# Patient Record
Sex: MALE | Race: WHITE | NOT HISPANIC OR LATINO | URBAN - METROPOLITAN AREA
[De-identification: names, ages, dates, MRNs, and addresses within clinical notes are randomized per-mention and may not be internally consistent; named-entity substitution may affect disease eponyms.]

---

## 2020-12-28 ENCOUNTER — IMMUNIZATIONS (OUTPATIENT)
Dept: FAMILY MEDICINE CLINIC | Facility: HOSPITAL | Age: 45
End: 2020-12-28

## 2020-12-28 DIAGNOSIS — Z23 ENCOUNTER FOR IMMUNIZATION: ICD-10-CM

## 2020-12-28 PROCEDURE — 0011A SARS-COV-2 / COVID-19 MRNA VACCINE (MODERNA) 100 MCG: CPT

## 2020-12-28 PROCEDURE — 91301 SARS-COV-2 / COVID-19 MRNA VACCINE (MODERNA) 100 MCG: CPT

## 2021-01-26 ENCOUNTER — IMMUNIZATIONS (OUTPATIENT)
Dept: FAMILY MEDICINE CLINIC | Facility: HOSPITAL | Age: 46
End: 2021-01-26

## 2021-01-26 DIAGNOSIS — Z23 ENCOUNTER FOR IMMUNIZATION: Primary | ICD-10-CM

## 2021-01-26 PROCEDURE — 91301 SARS-COV-2 / COVID-19 MRNA VACCINE (MODERNA) 100 MCG: CPT

## 2021-01-26 PROCEDURE — 0012A SARS-COV-2 / COVID-19 MRNA VACCINE (MODERNA) 100 MCG: CPT

## 2022-10-07 ENCOUNTER — APPOINTMENT (OUTPATIENT)
Dept: LAB | Facility: CLINIC | Age: 47
End: 2022-10-07

## 2022-10-07 ENCOUNTER — OCCMED (OUTPATIENT)
Dept: URGENT CARE | Facility: CLINIC | Age: 47
End: 2022-10-07

## 2022-10-07 DIAGNOSIS — Z02.1 PRE-EMPLOYMENT EXAMINATION: Primary | ICD-10-CM

## 2022-10-07 DIAGNOSIS — Z02.1 PRE-EMPLOYMENT EXAMINATION: ICD-10-CM

## 2022-10-07 LAB — RUBV IGG SERPL IA-ACNC: >175 IU/ML

## 2022-10-07 PROCEDURE — 86480 TB TEST CELL IMMUN MEASURE: CPT

## 2022-10-07 PROCEDURE — 86735 MUMPS ANTIBODY: CPT

## 2022-10-07 PROCEDURE — 86765 RUBEOLA ANTIBODY: CPT

## 2022-10-07 PROCEDURE — 86762 RUBELLA ANTIBODY: CPT

## 2022-10-07 PROCEDURE — 86787 VARICELLA-ZOSTER ANTIBODY: CPT

## 2022-10-07 PROCEDURE — 36415 COLL VENOUS BLD VENIPUNCTURE: CPT

## 2022-10-08 LAB
MEV IGG SER QL IA: NORMAL
MUV IGG SER QL IA: NORMAL
VZV IGG SER QL IA: NORMAL

## 2022-10-09 LAB
GAMMA INTERFERON BACKGROUND BLD IA-ACNC: 0.06 IU/ML
M TB IFN-G BLD-IMP: NEGATIVE
M TB IFN-G CD4+ BCKGRND COR BLD-ACNC: -0.01 IU/ML
M TB IFN-G CD4+ BCKGRND COR BLD-ACNC: -0.03 IU/ML
MITOGEN IGNF BCKGRD COR BLD-ACNC: >10 IU/ML

## 2022-10-27 ENCOUNTER — HOSPITAL ENCOUNTER (EMERGENCY)
Facility: HOSPITAL | Age: 47
Discharge: HOME | End: 2022-10-27
Attending: EMERGENCY MEDICINE
Payer: OTHER MISCELLANEOUS

## 2022-10-27 VITALS
RESPIRATION RATE: 18 BRPM | HEART RATE: 101 BPM | SYSTOLIC BLOOD PRESSURE: 126 MMHG | DIASTOLIC BLOOD PRESSURE: 74 MMHG | OXYGEN SATURATION: 96 % | TEMPERATURE: 98.6 F
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
## 2022-10-27 LAB
ALT SERPL W P-5'-P-CCNC: 24 U/L (ref 12–78)
HBV SURFACE AB SER-ACNC: >1000 MIU/ML
HBV SURFACE AG SER QL: NORMAL
HCV AB SER QL: NORMAL

## 2022-10-27 PROCEDURE — 86803 HEPATITIS C AB TEST: CPT

## 2022-10-27 PROCEDURE — 87389 HIV-1 AG W/HIV-1&-2 AB AG IA: CPT

## 2022-10-27 PROCEDURE — 86706 HEP B SURFACE ANTIBODY: CPT

## 2022-10-27 PROCEDURE — 84460 ALANINE AMINO (ALT) (SGPT): CPT

## 2022-10-27 PROCEDURE — 36415 COLL VENOUS BLD VENIPUNCTURE: CPT

## 2022-10-27 PROCEDURE — 87340 HEPATITIS B SURFACE AG IA: CPT
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
## 2022-10-27 NOTE — DISCHARGE INSTRUCTIONS
We will have to call you with the results of the exposure panel from the source patient  You will need to see your PCP in 30 days to repeat your blood testing  If you develop new or worsening symptoms, please return to the Emergency Department for further evaluation

## 2022-10-28 LAB — HIV 1+2 AB+HIV1 P24 AG SERPL QL IA: NORMAL
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
## 2023-01-11 ENCOUNTER — OFFICE VISIT (OUTPATIENT)
Dept: FAMILY MEDICINE CLINIC | Facility: CLINIC | Age: 48
End: 2023-01-11

## 2023-01-11 VITALS
OXYGEN SATURATION: 96 % | WEIGHT: 192.4 LBS | DIASTOLIC BLOOD PRESSURE: 80 MMHG | BODY MASS INDEX: 26.94 KG/M2 | RESPIRATION RATE: 14 BRPM | SYSTOLIC BLOOD PRESSURE: 136 MMHG | HEIGHT: 71 IN | HEART RATE: 100 BPM | TEMPERATURE: 97.3 F

## 2023-01-11 DIAGNOSIS — E55.9 VITAMIN D DEFICIENCY: ICD-10-CM

## 2023-01-11 DIAGNOSIS — E66.3 OVERWEIGHT: ICD-10-CM

## 2023-01-11 DIAGNOSIS — L65.9 HAIR LOSS: ICD-10-CM

## 2023-01-11 DIAGNOSIS — M79.10 MYALGIA: ICD-10-CM

## 2023-01-11 DIAGNOSIS — M79.18 MYOFASCIAL PAIN SYNDROME OF LUMBAR SPINE: ICD-10-CM

## 2023-01-11 DIAGNOSIS — M54.50 CHRONIC MIDLINE LOW BACK PAIN WITHOUT SCIATICA: ICD-10-CM

## 2023-01-11 DIAGNOSIS — N52.8 OTHER MALE ERECTILE DYSFUNCTION: ICD-10-CM

## 2023-01-11 DIAGNOSIS — E78.5 HYPERLIPIDEMIA, UNSPECIFIED HYPERLIPIDEMIA TYPE: ICD-10-CM

## 2023-01-11 DIAGNOSIS — Z11.4 ENCOUNTER FOR SCREENING FOR HIV: ICD-10-CM

## 2023-01-11 DIAGNOSIS — Z78.9 FALSE POSITIVE HIV SEROLOGY: ICD-10-CM

## 2023-01-11 DIAGNOSIS — Z13.1 SCREENING FOR DIABETES MELLITUS: ICD-10-CM

## 2023-01-11 DIAGNOSIS — F32.0 CURRENT MILD EPISODE OF MAJOR DEPRESSIVE DISORDER, UNSPECIFIED WHETHER RECURRENT (HCC): ICD-10-CM

## 2023-01-11 DIAGNOSIS — M47.816 LUMBAR SPONDYLOSIS: ICD-10-CM

## 2023-01-11 DIAGNOSIS — Q76.49 BERTOLOTTI'S SYNDROME: ICD-10-CM

## 2023-01-11 DIAGNOSIS — I10 PRIMARY HYPERTENSION: ICD-10-CM

## 2023-01-11 DIAGNOSIS — F41.9 ANXIETY: ICD-10-CM

## 2023-01-11 DIAGNOSIS — Z13.6 SCREENING FOR CARDIOVASCULAR CONDITION: ICD-10-CM

## 2023-01-11 DIAGNOSIS — Z00.00 ANNUAL PHYSICAL EXAM: Primary | ICD-10-CM

## 2023-01-11 DIAGNOSIS — G89.29 CHRONIC MIDLINE LOW BACK PAIN WITHOUT SCIATICA: ICD-10-CM

## 2023-01-11 PROBLEM — M54.9 BACK PAIN: Status: ACTIVE | Noted: 2018-10-03

## 2023-01-11 PROBLEM — N52.9 ED (ERECTILE DYSFUNCTION): Status: ACTIVE | Noted: 2023-01-11

## 2023-01-11 PROBLEM — F32.A DEPRESSION: Status: ACTIVE | Noted: 2023-01-11

## 2023-01-11 RX ORDER — LOSARTAN POTASSIUM 100 MG/1
1 TABLET ORAL DAILY
COMMUNITY
Start: 2023-01-09 | End: 2023-01-11 | Stop reason: SDUPTHER

## 2023-01-11 RX ORDER — ESCITALOPRAM OXALATE 20 MG/1
1 TABLET ORAL DAILY
COMMUNITY
Start: 2022-11-21 | End: 2023-01-11

## 2023-01-11 RX ORDER — BUPROPION HYDROCHLORIDE 300 MG/1
300 TABLET ORAL EVERY MORNING
Qty: 30 TABLET | Refills: 1 | Status: SHIPPED | OUTPATIENT
Start: 2023-01-11 | End: 2023-04-11

## 2023-01-11 RX ORDER — DULOXETIN HYDROCHLORIDE 60 MG/1
60 CAPSULE, DELAYED RELEASE ORAL DAILY
Qty: 30 CAPSULE | Refills: 1 | Status: SHIPPED | OUTPATIENT
Start: 2023-01-11

## 2023-01-11 RX ORDER — BUPROPION HYDROCHLORIDE 300 MG/1
300 TABLET ORAL EVERY MORNING
COMMUNITY
Start: 2022-10-24 | End: 2023-01-11 | Stop reason: SDUPTHER

## 2023-01-11 RX ORDER — LOSARTAN POTASSIUM 100 MG/1
100 TABLET ORAL DAILY
Qty: 30 TABLET | Refills: 1 | Status: SHIPPED | OUTPATIENT
Start: 2023-01-11

## 2023-01-11 RX ORDER — CYCLOBENZAPRINE HCL 10 MG
10 TABLET ORAL DAILY PRN
Qty: 30 TABLET | Refills: 0 | Status: SHIPPED | OUTPATIENT
Start: 2023-01-11

## 2023-01-11 RX ORDER — CYCLOBENZAPRINE HCL 10 MG
10 TABLET ORAL DAILY PRN
COMMUNITY
Start: 2022-10-07 | End: 2023-01-11 | Stop reason: SDUPTHER

## 2023-01-11 RX ORDER — TADALAFIL 2.5 MG/1
2.5 TABLET ORAL DAILY
Qty: 30 TABLET | Refills: 1 | Status: SHIPPED | OUTPATIENT
Start: 2023-01-11

## 2023-01-11 RX ORDER — SILDENAFIL 50 MG/1
50 TABLET, FILM COATED ORAL DAILY PRN
COMMUNITY
Start: 2022-10-07 | End: 2023-01-11

## 2023-01-11 RX ORDER — DULOXETIN HYDROCHLORIDE 30 MG/1
30 CAPSULE, DELAYED RELEASE ORAL DAILY
Qty: 7 CAPSULE | Refills: 0 | Status: SHIPPED | OUTPATIENT
Start: 2023-01-11

## 2023-01-11 NOTE — PATIENT INSTRUCTIONS
Wellness Visit for Adults   AMBULATORY CARE:   A wellness visit  is when you see your healthcare provider to get screened for health problems  Your healthcare provider will also give you advice on how to stay healthy  Write down your questions so you remember to ask them  Ask your healthcare provider how often you should have a wellness visit  What happens at a wellness visit:  Your healthcare provider will ask about your health, and your family history of health problems  This includes high blood pressure, heart disease, and cancer  He or she will ask if you have symptoms that concern you, if you smoke, and about your mood  You may also be asked about your intake of medicines, supplements, food, and alcohol  Any of the following may be done:  · Your weight  will be checked  Your height may also be checked so your body mass index (BMI) can be calculated  Your BMI shows if you are at a healthy weight  · Your blood pressure  and heart rate will be checked  Your temperature may also be checked  · Blood and urine tests  may be done  Blood tests may be done to check your cholesterol levels  Abnormal cholesterol levels increase your risk for heart disease and stroke  You may also need a blood or urine test to check for diabetes if you are at increased risk  Urine tests may be done to look for signs of an infection or kidney disease  · A physical exam  includes checking your heartbeat and lungs with a stethoscope  Your healthcare provider may also check your skin to look for sun damage  · Screening tests  may be recommended  A screening test is done to check for diseases that may not cause symptoms  The screening tests you may need depend on your age, gender, family history, and lifestyle habits  For example, colorectal screening may be recommended if you are 48years old or older  Screening tests you need if you are a woman:   · A Pap smear  is used to screen for cervical cancer   Pap smears are usually done every 3 to 5 years depending on your age  You may need them more often if you have had abnormal Pap smear test results in the past  Ask your healthcare provider how often you should have a Pap smear  · A mammogram  is an x-ray of your breasts to screen for breast cancer  Experts recommend mammograms every 2 years starting at age 48 years  You may need a mammogram at age 52 years or younger if you have an increased risk for breast cancer  Talk to your healthcare provider about when you should start having mammograms and how often you need them  Vaccines you may need:   · Get an influenza vaccine  every year  The influenza vaccine protects you from the flu  Several types of viruses cause the flu  The viruses change over time, so new vaccines are made each year  · Get a tetanus-diphtheria (Td) booster vaccine  every 10 years  This vaccine protects you against tetanus and diphtheria  Tetanus is a severe infection that may cause painful muscle spasms and lockjaw  Diphtheria is a severe bacterial infection that causes a thick covering in the back of your mouth and throat  · Get a human papillomavirus (HPV) vaccine  if you are female and aged 23 to 32 or male 23 to 24 and never received it  This vaccine protects you from HPV infection  HPV is the most common infection spread by sexual contact  HPV may also cause vaginal, penile, and anal cancers  · Get a pneumococcal vaccine  if you are aged 72 years or older  The pneumococcal vaccine is an injection given to protect you from pneumococcal disease  Pneumococcal disease is an infection caused by pneumococcal bacteria  The infection may cause pneumonia, meningitis, or an ear infection  · Get a shingles vaccine  if you are 60 or older, even if you have had shingles before  The shingles vaccine is an injection to protect you from the varicella-zoster virus  This is the same virus that causes chickenpox   Shingles is a painful rash that develops in people who had chickenpox or have been exposed to the virus  How to eat healthy:  My Plate is a model for planning healthy meals  It shows the types and amounts of foods that should go on your plate  Fruits and vegetables make up about half of your plate, and grains and protein make up the other half  A serving of dairy is included on the side of your plate  The amount of calories and serving sizes you need depends on your age, gender, weight, and height  Examples of healthy foods are listed below:  · Eat a variety of vegetables  such as dark green, red, and orange vegetables  You can also include canned vegetables low in sodium (salt) and frozen vegetables without added butter or sauces  · Eat a variety of fresh fruits , canned fruit in 100% juice, frozen fruit, and dried fruit  · Include whole grains  At least half of the grains you eat should be whole grains  Examples include whole-wheat bread, wheat pasta, brown rice, and whole-grain cereals such as oatmeal     · Eat a variety of protein foods such as seafood (fish and shellfish), lean meat, and poultry without skin (turkey and chicken)  Examples of lean meats include pork leg, shoulder, or tenderloin, and beef round, sirloin, tenderloin, and extra lean ground beef  Other protein foods include eggs and egg substitutes, beans, peas, soy products, nuts, and seeds  · Choose low-fat dairy products such as skim or 1% milk or low-fat yogurt, cheese, and cottage cheese  · Limit unhealthy fats  such as butter, hard margarine, and shortening  Exercise:  Exercise at least 30 minutes per day on most days of the week  Some examples of exercise include walking, biking, dancing, and swimming  You can also fit in more physical activity by taking the stairs instead of the elevator or parking farther away from stores  Include muscle strengthening activities 2 days each week  Regular exercise provides many health benefits   It helps you manage your weight, and decreases your risk for type 2 diabetes, heart disease, stroke, and high blood pressure  Exercise can also help improve your mood  Ask your healthcare provider about the best exercise plan for you  General health and safety guidelines:   · Do not smoke  Nicotine and other chemicals in cigarettes and cigars can cause lung damage  Ask your healthcare provider for information if you currently smoke and need help to quit  E-cigarettes or smokeless tobacco still contain nicotine  Talk to your healthcare provider before you use these products  · Limit alcohol  A drink of alcohol is 12 ounces of beer, 5 ounces of wine, or 1½ ounces of liquor  · Lose weight, if needed  Being overweight increases your risk of certain health conditions  These include heart disease, high blood pressure, type 2 diabetes, and certain types of cancer  · Protect your skin  Do not sunbathe or use tanning beds  Use sunscreen with a SPF 15 or higher  Apply sunscreen at least 15 minutes before you go outside  Reapply sunscreen every 2 hours  Wear protective clothing, hats, and sunglasses when you are outside  · Drive safely  Always wear your seatbelt  Make sure everyone in your car wears a seatbelt  A seatbelt can save your life if you are in an accident  Do not use your cell phone when you are driving  This could distract you and cause an accident  Pull over if you need to make a call or send a text message  · Practice safe sex  Use latex condoms if are sexually active and have more than one partner  Your healthcare provider may recommend screening tests for sexually transmitted infections (STIs)  · Wear helmets, lifejackets, and protective gear  Always wear a helmet when you ride a bike or motorcycle, go skiing, or play sports that could cause a head injury  Wear protective equipment when you play sports  Wear a lifejacket when you are on a boat or doing water sports      © Copyright [employer] 2022 Information is for End User's use only and may not be sold, redistributed or otherwise used for commercial purposes  All illustrations and images included in CareNotes® are the copyrighted property of [employer]  or Janee Olivier  The above information is an  only  It is not intended as medical advice for individual conditions or treatments  Talk to your doctor, nurse or pharmacist before following any medical regimen to see if it is safe and effective for you  Weight Management   AMBULATORY CARE:   Why it is important to manage your weight:  Being overweight increases your risk of health conditions such as heart disease, high blood pressure, type 2 diabetes, and certain types of cancer  It can also increase your risk for osteoarthritis, sleep apnea, and other respiratory problems  Aim for a slow, steady weight loss  Even a small amount of weight loss can lower your risk of health problems  Risks of being overweight:  Extra weight can cause many health problems, including the following:  · Diabetes (high blood sugar level)    · High blood pressure or high cholesterol    · Heart disease    · Stroke    · Gallbladder or liver disease    · Cancer of the colon, breast, prostate, liver, or kidney    · Sleep apnea    · Arthritis or gout    Screening  is done to check for health conditions before you have signs or symptoms  If you are 28to 79years old, your blood sugar level may be checked every 3 years for signs of prediabetes or diabetes  Your healthcare provider will check your blood pressure at each visit  High blood pressure can lead to a stroke or other problems  Your provider may check for signs of heart disease, cancer, or other health problems  How to lose weight safely:  A safe and healthy way to lose weight is to eat fewer calories and get regular exercise  · You can lose up about 1 pound a week by decreasing the number of calories you eat by 500 calories each day   You can decrease calories by eating smaller portion sizes or by cutting out high-calorie foods  Read labels to find out how many calories are in the foods you eat  · You can also burn calories with exercise such as walking, swimming, or biking  You will be more likely to keep weight off if you make these changes part of your lifestyle  Exercise at least 30 minutes per day on most days of the week  You can also fit in more physical activity by taking the stairs instead of the elevator or parking farther away from stores  Ask your healthcare provider about the best exercise plan for you  Healthy meal plan for weight management:  A healthy meal plan includes a variety of foods, contains fewer calories, and helps you stay healthy  A healthy meal plan includes the following:     · Eat whole-grain foods more often  A healthy meal plan should contain fiber  Fiber is the part of grains, fruits, and vegetables that is not broken down by your body  Whole-grain foods are healthy and provide extra fiber in your diet  Some examples of whole-grain foods are whole-wheat breads and pastas, oatmeal, brown rice, and bulgur  · Eat a variety of vegetables every day  Include dark, leafy greens such as spinach, kale, tsering greens, and mustard greens  Eat yellow and orange vegetables such as carrots, sweet potatoes, and winter squash  · Eat a variety of fruits every day  Choose fresh or canned fruit (canned in its own juice or light syrup) instead of juice  Fruit juice has very little or no fiber  · Eat low-fat dairy foods  Drink fat-free (skim) milk or 1% milk  Eat fat-free yogurt and low-fat cottage cheese  Try low-fat cheeses such as mozzarella and other reduced-fat cheeses  · Choose meat and other protein foods that are low in fat  Choose beans or other legumes such as split peas or lentils  Choose fish, skinless poultry (chicken or turkey), or lean cuts of red meat (beef or pork)   Before you cook meat or poultry, cut off any visible fat  · Use less fat and oil  Try baking foods instead of frying them  Add less fat, such as margarine, sour cream, regular salad dressing and mayonnaise to foods  Eat fewer high-fat foods  Some examples of high-fat foods include french fries, doughnuts, ice cream, and cakes  · Eat fewer sweets  Limit foods and drinks that are high in sugar  This includes candy, cookies, regular soda, and sweetened drinks  Ways to decrease calories:   · Eat smaller portions  ? Use a small plate with smaller servings  ? Do not eat second helpings  ? When you eat at a restaurant, ask for a box and place half of your meal in the box before you eat  ? Share an entrée with someone else  · Replace high-calorie snacks with healthy, low-calorie snacks  ? Choose fresh fruit, vegetables, fat-free rice cakes, or air-popped popcorn instead of potato chips, nuts, or chocolate  ? Choose water or calorie-free drinks instead of soda or sweetened drinks  · Do not shop for groceries when you are hungry  You may be more likely to make unhealthy food choices  Take a grocery list of healthy foods and shop after you have eaten  · Eat regular meals  Do not skip meals  Skipping meals can lead to overeating later in the day  This can make it harder for you to lose weight  Eat a healthy snack in place of a meal if you do not have time to eat a regular meal  Talk with a dietitian to help you create a meal plan and schedule that is right for you  Other things to consider as you try to lose weight:   · Be aware of situations that may give you the urge to overeat, such as eating while watching television  Find ways to avoid these situations  For example, read a book, go for a walk, or do crafts  · Meet with a weight loss support group or friends who are also trying to lose weight  This may help you stay motivated to continue working on your weight loss goals      © Copyright [employer] 2022 Information is for End User's use only and may not be sold, redistributed or otherwise used for commercial purposes  All illustrations and images included in CareNotes® are the copyrighted property of [employer] Inc  or Janee Olivier  The above information is an  only  It is not intended as medical advice for individual conditions or treatments  Talk to your doctor, nurse or pharmacist before following any medical regimen to see if it is safe and effective for you  Cholesterol and Your Health   AMBULATORY CARE:   Cholesterol  is a waxy, fat-like substance  Your body uses cholesterol to make hormones and new cells, and to protect nerves  Cholesterol is made by your body  It also comes from certain foods you eat, such as meat and dairy products  Your healthcare provider can help you set goals for your cholesterol levels  He or she can help you create a plan to meet your goals  Cholesterol level goals: Your cholesterol level goals depend on your risk for heart disease, your age, and your other health conditions  The following are general guidelines:  · Total cholesterol  includes low-density lipoprotein (LDL), high-density lipoprotein (HDL), and triglyceride levels  The total cholesterol level should be lower than 200 mg/dL and is best at about 150 mg/dL  · LDL cholesterol  is called bad cholesterol  because it forms plaque in your arteries  As plaque builds up, your arteries become narrow, and less blood flows through  When plaque decreases blood flow to your heart, you may have chest pain  If plaque completely blocks an artery that brings blood to your heart, you may have a heart attack  Plaque can break off and form blood clots  Blood clots may block arteries in your brain and cause a stroke  The level should be less than 130 mg/dL and is best at about 100 mg/dL  · HDL cholesterol  is called good cholesterol  because it helps remove LDL cholesterol from your arteries   It does this by attaching to LDL cholesterol and carrying it to your liver  Your liver breaks down LDL cholesterol so your body can get rid of it  High levels of HDL cholesterol can help prevent a heart attack and stroke  Low levels of HDL cholesterol can increase your risk for heart disease, heart attack, and stroke  The level should be 60 mg/dL or higher  · Triglycerides  are a type of fat that store energy from foods you eat  High levels of triglycerides also cause plaque buildup  This can increase your risk for a heart attack or stroke  If your triglyceride level is high, your LDL cholesterol level may also be high  The level should be less than 150 mg/dL  Any of the following can increase your risk for high cholesterol:   · Smoking cigarettes    · Being overweight or obese, or not getting enough exercise    · Drinking large amounts of alcohol    · A medical condition such as hypertension (high blood pressure) or diabetes    · Certain genes passed from your parents to you    · Age older than 65 years    What you need to know about having your cholesterol levels checked: Adults 21to 39years of age should have their cholesterol levels checked every 4 to 6 years  Adults 45 years or older should have their cholesterol checked every 1 to 2 years  You may need your cholesterol checked more often, or at a younger age, if you have risk factors for heart disease  You may also need to have your cholesterol checked more often if you have other health conditions, such as diabetes  Blood tests are used to check cholesterol levels  Blood tests measure your levels of triglycerides, LDL cholesterol, and HDL cholesterol  How healthy fats affect your cholesterol levels:  Healthy fats, also called unsaturated fats, help lower LDL cholesterol and triglyceride levels  Healthy fats include the following:  · Monounsaturated fats  are found in foods such as olive oil, canola oil, avocado, nuts, and olives      · Polyunsaturated fats,  such as omega 3 fats, are found in fish, such as salmon, trout, and tuna  They can also be found in plant foods such as flaxseed, walnuts, and soybeans  How unhealthy fats affect your cholesterol levels:  Unhealthy fats increase LDL cholesterol and triglyceride levels  They are found in foods high in cholesterol, saturated fat, and trans fat:  · Cholesterol  is found in eggs, dairy, and meat  · Saturated fat  is found in butter, cheese, ice cream, whole milk, and coconut oil  Saturated fat is also found in meat, such as sausage, hot dogs, and bologna  · Trans fat  is found in liquid oils and is used in fried and baked foods  Foods that contain trans fats include chips, crackers, muffins, sweet rolls, microwave popcorn, and cookies  Treatment  for high cholesterol will also decrease your risk of heart disease, heart attack, and stroke  Treatment may include any of the following:  · Lifestyle changes  may include food, exercise, weight loss, and quitting smoking  You may also need to decrease the amount of alcohol you drink  Your healthcare provider will want you to start with lifestyle changes  Other treatment may be added if lifestyle changes are not enough  Your healthcare provider may recommend you work with a team to manage hyperlipidemia  The team may include medical experts such as a dietitian, an exercise or physical therapist, and a behavior therapist  Your family members may be included in helping you create lifestyle changes  · Medicines  may be given to lower your LDL cholesterol, triglyceride levels, or total cholesterol level  You may need medicines to lower your cholesterol if any of the following is true:    ? You have a history of stroke, TIA, unstable angina, or a heart attack  ? Your LDL cholesterol level is 190 mg/dL or higher  ? You are age 36 to 76 years, have diabetes or heart disease risk factors, and your LDL cholesterol is 70 mg/dL or higher      · Supplements  include fish oil, red yeast rice, and garlic  Fish oil may help lower your triglyceride and LDL cholesterol levels  It may also increase your HDL cholesterol level  Red yeast rice may help decrease your total cholesterol level and LDL cholesterol level  Garlic may help lower your total cholesterol level  Do not take any supplements without talking to your healthcare provider  Food changes you can make to lower your cholesterol levels:  A dietitian can help you create a healthy eating plan  He or she can show you how to read food labels and choose foods low in saturated fat, trans fats, and cholesterol  · Decrease the total amount of fat you eat  Choose lean meats, fat-free or 1% fat milk, and low-fat dairy products, such as yogurt and cheese  Try to limit or avoid red meats  Limit or do not eat fried foods or baked goods, such as cookies  · Replace unhealthy fats with healthy fats  Cook foods in olive oil or canola oil  Choose soft margarines that are low in saturated fat and trans fat  Seeds, nuts, and avocados are other examples of healthy fats  · Eat foods with omega-3 fats  Examples include salmon, tuna, mackerel, walnuts, and flaxseed  Eat fish 2 times per week  Pregnant women should not eat fish that have high levels of mercury, such as shark, swordfish, and daryn mackerel  · Increase the amount of high-fiber foods you eat  High-fiber foods can help lower your LDL cholesterol  Aim to get between 20 and 30 grams of fiber each day  Fruits and vegetables are high in fiber  Eat at least 5 servings each day  Other high-fiber foods are whole-grain or whole-wheat breads, pastas, or cereals, and brown rice  Eat 3 ounces of whole-grain foods each day  Increase fiber slowly  You may have abdominal discomfort, bloating, and gas if you add fiber to your diet too quickly  · Eat healthy protein foods  Examples include low-fat dairy products, skinless chicken and turkey, fish, and nuts      · Limit foods and drinks that are high in sugar  Your dietitian or healthcare provider can help you create daily limits for high-sugar foods and drinks  The limit may be lower if you have diabetes or another health condition  Limits can also help you lose weight if needed  Lifestyle changes you can make to lower your cholesterol levels:   · Maintain a healthy weight  Ask your healthcare provider what a healthy weight is for you  Ask him or her to help you create a weight loss plan if needed  Weight loss can decrease your total cholesterol and triglyceride levels  Weight loss may also help keep your blood pressure at a healthy level  · Be physically active throughout the day  Physical activity, such as exercise, can help lower your total cholesterol level and maintain a healthy weight  Physical activity can also help increase your HDL cholesterol level  Work with your healthcare provider to create an program that is right for you  Get at least 30 to 40 minutes of moderate physical activity most days of the week  Examples of exercise include brisk walking, swimming, or biking  Also include strength training at least 2 times each week  Your healthcare providers can help you create a physical activity plan  · Do not smoke  Nicotine and other chemicals in cigarettes and cigars can raise your cholesterol levels  Ask your healthcare provider for information if you currently smoke and need help to quit  E-cigarettes or smokeless tobacco still contain nicotine  Talk to your healthcare provider before you use these products  · Limit or do not drink alcohol  Alcohol can increase your triglyceride levels  Ask your healthcare provider before you drink alcohol  Ask how much is okay for you to drink in 24 hours or 1 week  Follow up with your doctor as directed:  Write down your questions so you remember to ask them during your visits    © Copyright [employer] 2022 Information is for End User's use only and may not be sold, redistributed or otherwise used for commercial purposes  All illustrations and images included in CareNotes® are the copyrighted property of [employer]  or Janee Olivier  The above information is an  only  It is not intended as medical advice for individual conditions or treatments  Talk to your doctor, nurse or pharmacist before following any medical regimen to see if it is safe and effective for you

## 2023-01-11 NOTE — PROGRESS NOTES
Assessment/Plan:  Problem List Items Addressed This Visit        Cardiovascular and Mediastinum    Hypertension     Borderline BP today  Check blood pressure outside of office  Recommend lifestyle modifications  Relevant Medications    losartan (COZAAR) 100 MG tablet    Other Relevant Orders    Ambulatory Referral to Nutrition Services       Nervous and Auditory    Bertolotti's syndrome     S/p OAA Spine evaluation  May need to return in future PRN? Hopefully Cymbalta 60mg daily to improve pain and will no longer need Flexeril 10mg QD PRN  Relevant Medications    cyclobenzaprine (FLEXERIL) 10 mg tablet       Musculoskeletal and Integument    Lumbar spondylosis     S/p OAA Spine evaluation  May need to return in future PRN? Hopefully Cymbalta 60mg daily to improve pain and will no longer need Flexeril 10mg QD PRN  Relevant Medications    cyclobenzaprine (FLEXERIL) 10 mg tablet    Myofascial pain syndrome of lumbar spine     S/p OAA Spine evaluation  May need to return in future PRN? Hopefully Cymbalta 60mg daily to improve pain and will no longer need Flexeril 10mg QD PRN  Relevant Medications    DULoxetine (Cymbalta) 30 mg delayed release capsule    DULoxetine (CYMBALTA) 60 mg delayed release capsule    cyclobenzaprine (FLEXERIL) 10 mg tablet       Other    Anxiety     Uncontrolled  D/C Lexapro 20mg QD due to inefficacy  Start Cymbalta 60mg QD, which may benefit mood and back pain  Continue counseling  Relevant Medications    DULoxetine (Cymbalta) 30 mg delayed release capsule    DULoxetine (CYMBALTA) 60 mg delayed release capsule    Other Relevant Orders    TSH, 3rd generation with Free T4 reflex    Depression     Uncontrolled  D/C Lexapro 20mg QD due to inefficacy  Start Cymbalta 60mg QD, which may benefit mood and back pain  Continue counseling             Relevant Medications    DULoxetine (Cymbalta) 30 mg delayed release capsule    DULoxetine (CYMBALTA) 60 mg delayed release capsule    buPROPion (WELLBUTRIN XL) 300 mg 24 hr tablet    Other Relevant Orders    TSH, 3rd generation with Free T4 reflex    Hyperlipidemia     Pending labs  Previously stable s statin  Recommend lifestyle modifications  The ASCVD Risk score (Dee KING, et al , 2019) failed to calculate for the following reasons:    Cannot find a previous HDL lab    Cannot find a previous total cholesterol lab           Relevant Orders    Ambulatory Referral to Nutrition Services    CBC and differential    Comprehensive metabolic panel    Lipid panel    LDL cholesterol, direct    TSH, 3rd generation with Free T4 reflex    Overweight     Recommend lifestyle modifications  Relevant Orders    Ambulatory Referral to Nutrition Services    ED (erectile dysfunction)     D/C Viagra 50mg QD PRN  Start Cialis 2 5mg QD as patient prefers daily Rx  Pending labs  Relevant Medications    tadalafil (CIALIS) 2 5 MG tablet    Other Relevant Orders    Testosterone, free, total    Vitamin D deficiency     Pending labs  Relevant Orders    Ambulatory Referral to Nutrition Services    Back pain     S/p OAA Spine evaluation  May need to return in future PRN? Hopefully Cymbalta 60mg daily to improve pain and will no longer need Flexeril 10mg QD PRN            Other Visit Diagnoses     Annual physical exam    -  Primary    Screening for diabetes mellitus        Relevant Orders    Hemoglobin A1C    Screening for cardiovascular condition        Relevant Orders    CBC and differential    Comprehensive metabolic panel    Lipid panel    LDL cholesterol, direct    BMI 27 0-27 9,adult        Relevant Orders    TSH, 3rd generation with Free T4 reflex    Hair loss        Myalgia        Relevant Orders    Vitamin D 25 hydroxy    False positive HIV serology        Encounter for screening for HIV        Relevant Orders    : HIV 1/2 AB/AG w Reflex [facility] for 2 yr old and above    : HIV 1/2 AB/AG w Reflex [facility] for 2 yr old and above    : HIV 1/2 AB/AG w Reflex [facility] for 2 yr old and above           Return in about 6 weeks (around 2/22/2023) for F/U HTN, HL, Anx/Dep, ED, LBP, Labs  Future Appointments   Date Time Provider Patricio Sanders   2/28/2023  9:40 AM Marcial Horn, DO [facility]        Subjective:     Constantion Zarate is a 49 y o  male who presents today as a new patient for his medical conditions  New Patient    Previous PCP:  Dr Siva Osullivan at [facility]  Reason for Transfer:  Insurance  Last seen by previous PCP:  10/7/22  Last Labs:  10/27/22  Last Physical:  4/7/22  Medical Records Requested:  No      HPI:  Chief Complaint   Patient presents with   • New Patient Visit     Here to establish care  No problems or concerns at this time  • HM     Declines covid boosters at this time  Last colonoscopy was last year at [facility]  -- Above per clinical staff and reviewed  --      HPI      Today:      Controlled Substance Review    [place] PDMP or [place]  reviewed: No red flags were identified; safe to proceed with prescription  Hair Loss - Wants to know if Nutrafol is safe  He had not tried Rogaine yet  Overweight - Would like to see Nutritionist   Not watching diet  Drinks soda, eats fast food  +Exercise - [place] [employer] for 1 hours 2-3 times per week  HTN - On Losartan 100mg QD  No other HTN meds previously  No BP check outside of office  Hyperlipidemia - No statin previously  H/O  Grave's Disease / Hyperthyroidism - s/p Tapazole x 2 years, then radioactive iodine, which resulted in euthyroid status  Anxiety / Depression - Mood is "not great' - since summer 2022 - increased stress at work and in marriage  Previous Management per Psych at [address] - last seen 2022  - F/U PRN as was D/C to PCP Management  On Lexapro 20mg QD since 2021 and Wellbutrin XL 300mg QD in AM since 9/22    Previously on Zoloft, Prozac - did fine with these meds  No other mood meds or higher dose previously  Sees counseling q2 weeks  Next appt   PHQ-2/9 Depression Screening    Little interest or pleasure in doing things: 2 - more than half the days  Feeling down, depressed, or hopeless: 2 - more than half the days  Trouble falling or staying asleep, or sleeping too much: 2 - more than half the days  Feeling tired or having little energy: 2 - more than half the days  Poor appetite or overeatin - not at all  Feeling bad about yourself - or that you are a failure or have let yourself or your family down: 1 - several days  Trouble concentrating on things, such as reading the newspaper or watching television: 0 - not at all  Moving or speaking so slowly that other people could have noticed  Or the opposite - being so fidgety or restless that you have been moving around a lot more than usual: 0 - not at all  Thoughts that you would be better off dead, or of hurting yourself in some way: 0 - not at all  PHQ-2 Score: 4  PHQ-2 Interpretation: POSITIVE depression screen  PHQ-9 Score: 9   PHQ-9 Interpretation: Mild depression          CORA-7 Flowsheet Screening    Flowsheet Row Most Recent Value   Over the last 2 weeks, how often have you been bothered by any of the following problems? Feeling nervous, anxious, or on edge 1   Not being able to stop or control worrying 2   Worrying too much about different things 2   Trouble relaxing 2   Being so restless that it is hard to sit still 0   Becoming easily annoyed or irritable 1   Feeling afraid as if something awful might happen 0   CORA-7 Total Score 8        MDQ:  0, Asynchronous, No Problem    ED - Patient feels due to Lexapro and Wellbutrin  On Viagra 50mg QD PRN - helpful  Patient is interested in daily Cialis  He is unsure if Rx is covered by insurance  No other ED meds or higher doses previously      Chronic Lower Back Pain / Lumbar Spondylosis / Myofascial Pain Syndrome of Lumbar Spine / Bertolotti's Syndrome  - Has daily spasms on medical sacrum, radiates into B/L buttocks  Injured his back in 2015 during  training in [place]  He is using Flexeril 10mg 1/2 -1 daily PRN - taking 3-4 times per week  Previously seen Db 54 2021  S/p ALEC s benefit  Last MRI in [place] in [id number]  Surgery was not advised per advised  Last PT in [place] 2018 s benefit  Vitamin D Deficiency - No Drisdol  Not taking MVI, but taking OTC Vitamin D 5,000IU daily  H/O False Positive HIV test - s/p [facility] e-consult per Dr Michelle Mares 10/7/22, who advised ID F/U vs  PCP repeat the testing at three months, six months and one year, and include HIV PCR testing with the routine screening if it does not already reflex that way  Normal colonoscopy 11/12/20 by GI Dr Bonifacio Ramos @ [facility]  Repeat in 10 years  Reviewed:  Labs 10/27/22       Sees Dentist q6 months  Sees Optho q2 years  The following portions of the patient's history were reviewed and updated as appropriate: allergies, current medications, past family history, past medical history, past social history, past surgical history and problem list       Review of Systems   Constitutional: Negative for appetite change, chills, diaphoresis, fatigue and fever  Respiratory: Negative for chest tightness and shortness of breath  Cardiovascular: Negative for chest pain  Gastrointestinal: Negative for abdominal pain, blood in stool, diarrhea, nausea and vomiting  Genitourinary: Negative for dysuria  Musculoskeletal: Positive for back pain          Current Outpatient Medications   Medication Sig Dispense Refill   • buPROPion (WELLBUTRIN XL) 300 mg 24 hr tablet Take 1 tablet (300 mg total) by mouth every morning 30 tablet 1   • Cholecalciferol (Vitamin D3) 125 MCG (5000 UT) TABS Take 5,000 Units by mouth daily     • [employer] FIBER GUMMIES PO Take 2 tablets by mouth in the morning     • cyclobenzaprine (FLEXERIL) 10 mg tablet Take 1 tablet (10 mg total) by mouth daily as needed for muscle spasms 30 tablet 0   • DULoxetine (Cymbalta) 30 mg delayed release capsule Take 1 capsule (30 mg total) by mouth daily 7 capsule 0   • DULoxetine (CYMBALTA) 60 mg delayed release capsule Take 1 capsule (60 mg total) by mouth daily Start after taking 30mg daily x 1 week  30 capsule 1   • losartan (COZAAR) 100 MG tablet Take 1 tablet (100 mg total) by mouth daily 30 tablet 1   • tadalafil (CIALIS) 2 5 MG tablet Take 1 tablet (2 5 mg total) by mouth daily 30 tablet 1     No current facility-administered medications for this visit  Objective:  /80   Pulse 100   Temp (!) 97 3 °F (36 3 °C)   Resp 14   Ht 5' 10 5" (1 791 m)   Wt 87 3 kg (192 lb 6 4 oz)   SpO2 96%   BMI 27 22 kg/m²    Wt Readings from Last 3 Encounters:   01/11/23 87 3 kg (192 lb 6 4 oz)      BP Readings from Last 3 Encounters:   01/11/23 136/80   10/27/22 126/74          Physical Exam  Vitals and nursing note reviewed  Constitutional:       Appearance: Normal appearance  He is well-developed  HENT:      Head: Normocephalic and atraumatic  Right Ear: Tympanic membrane, ear canal and external ear normal       Left Ear: Tympanic membrane, ear canal and external ear normal       Nose: Nose normal       Right Sinus: No maxillary sinus tenderness or frontal sinus tenderness  Left Sinus: No maxillary sinus tenderness or frontal sinus tenderness  Mouth/Throat:      Mouth: Mucous membranes are moist       Pharynx: Oropharynx is clear  Uvula midline  Tonsils: No tonsillar exudate  Eyes:      Extraocular Movements: Extraocular movements intact  Conjunctiva/sclera: Conjunctivae normal       Pupils: Pupils are equal, round, and reactive to light  Cardiovascular:      Rate and Rhythm: Normal rate and regular rhythm  Pulses: Normal pulses  Heart sounds: Normal heart sounds  Pulmonary:      Effort: Pulmonary effort is normal       Breath sounds: Normal breath sounds  Abdominal:      General: Bowel sounds are normal  There is no distension  Palpations: Abdomen is soft  There is no mass  Tenderness: There is no abdominal tenderness  There is no guarding or rebound  Musculoskeletal:         General: Tenderness (Medial sacrum) present  No swelling  Normal range of motion  Cervical back: Neck supple  Right lower leg: No edema  Left lower leg: No edema  Comments: Negative SLR B/L  Back c full ROM  Lymphadenopathy:      Cervical: No cervical adenopathy  Skin:     Findings: No rash  Neurological:      General: No focal deficit present  Mental Status: He is alert and oriented to person, place, and time  Cranial Nerves: No cranial nerve deficit  Sensory: No sensory deficit  Motor: No weakness  Coordination: Coordination normal       Gait: Gait normal       Deep Tendon Reflexes: Reflexes normal    Psychiatric:         Mood and Affect: Mood normal          Behavior: Behavior normal          Thought Content: Thought content normal          Judgment: Judgment normal          Lab Results:      Lab Results   Component Value Date    ALT 24 10/27/2022     No results found for: URICACID  Invalid input(s): BASENAME Vitamin D    No results found       POCT Labs

## 2023-01-12 ENCOUNTER — TELEPHONE (OUTPATIENT)
Dept: ADMINISTRATIVE | Facility: OTHER | Age: 48
End: 2023-01-12

## 2023-01-12 NOTE — ASSESSMENT & PLAN NOTE
Uncontrolled  D/C Lexapro 20mg QD due to inefficacy  Start Cymbalta 60mg QD, which may benefit mood and back pain  Continue counseling

## 2023-01-12 NOTE — ASSESSMENT & PLAN NOTE
S/p OAA Spine evaluation  May need to return in future PRN? Hopefully Cymbalta 60mg daily to improve pain and will no longer need Flexeril 10mg QD PRN

## 2023-01-12 NOTE — TELEPHONE ENCOUNTER
----- Message from Juana Hou DO sent at 1/11/2023  1:49 PM EST -----  Regarding: Normal colonoscopy 11/12/20 by GI Dr Bret Mendoza @ [facility]   01/11/23 1:50 PM    Hello, our patient Marcelo Marti has had CRC: Colonoscopy completed/performed  Please assist in updating the patient chart by pulling the Care Everywhere (CE) document  The date of service is  Normal colonoscopy 11/12/20 by GI Dr Bret Mendoza @ [facility]  Repeat in 10 years         Thank you,  Maxine Nava DO  [facility]  Sandie Lainez

## 2023-01-12 NOTE — ASSESSMENT & PLAN NOTE
Pending labs  Previously stable s statin  Recommend lifestyle modifications       The ASCVD Risk score (Dee DK, et al , 2019) failed to calculate for the following reasons:    Cannot find a previous HDL lab    Cannot find a previous total cholesterol lab

## 2023-01-12 NOTE — TELEPHONE ENCOUNTER
Upon review of the In Basket request we were able to locate, review, and update the patient chart as requested for CRC: Colonoscopy  Any additional questions or concerns should be emailed to the Practice Liaisons via the appropriate education email address, please do not reply via In Basket      Thank you  Lodema Salines

## 2023-01-12 NOTE — PROGRESS NOTES
Assessment/Plan:  Problem List Items Addressed This Visit        Cardiovascular and Mediastinum    Hypertension     Borderline BP today  Check blood pressure outside of office  Recommend lifestyle modifications  Relevant Medications    losartan (COZAAR) 100 MG tablet    Other Relevant Orders    Ambulatory Referral to Nutrition Services       Nervous and Auditory    Bertolotti's syndrome     S/p OAA Spine evaluation  May need to return in future PRN? Hopefully Cymbalta 60mg daily to improve pain and will no longer need Flexeril 10mg QD PRN  Relevant Medications    cyclobenzaprine (FLEXERIL) 10 mg tablet       Musculoskeletal and Integument    Lumbar spondylosis     S/p OAA Spine evaluation  May need to return in future PRN? Hopefully Cymbalta 60mg daily to improve pain and will no longer need Flexeril 10mg QD PRN  Relevant Medications    cyclobenzaprine (FLEXERIL) 10 mg tablet    Myofascial pain syndrome of lumbar spine     S/p OAA Spine evaluation  May need to return in future PRN? Hopefully Cymbalta 60mg daily to improve pain and will no longer need Flexeril 10mg QD PRN  Relevant Medications    DULoxetine (Cymbalta) 30 mg delayed release capsule    DULoxetine (CYMBALTA) 60 mg delayed release capsule    cyclobenzaprine (FLEXERIL) 10 mg tablet       Other    Anxiety     Uncontrolled  D/C Lexapro 20mg QD due to inefficacy  Start Cymbalta 60mg QD, which may benefit mood and back pain  Continue counseling  Relevant Medications    DULoxetine (Cymbalta) 30 mg delayed release capsule    DULoxetine (CYMBALTA) 60 mg delayed release capsule    Other Relevant Orders    TSH, 3rd generation with Free T4 reflex    Depression     Uncontrolled  D/C Lexapro 20mg QD due to inefficacy  Start Cymbalta 60mg QD, which may benefit mood and back pain  Continue counseling             Relevant Medications    DULoxetine (Cymbalta) 30 mg delayed release capsule    DULoxetine (CYMBALTA) 60 mg delayed release capsule    buPROPion (WELLBUTRIN XL) 300 mg 24 hr tablet    Other Relevant Orders    TSH, 3rd generation with Free T4 reflex    Hyperlipidemia     Pending labs  Previously stable s statin  Recommend lifestyle modifications  The ASCVD Risk score (Dee KING, et al , 2019) failed to calculate for the following reasons:    Cannot find a previous HDL lab    Cannot find a previous total cholesterol lab           Relevant Orders    Ambulatory Referral to Nutrition Services    CBC and differential    Comprehensive metabolic panel    Lipid panel    LDL cholesterol, direct    TSH, 3rd generation with Free T4 reflex    Overweight     Recommend lifestyle modifications  Relevant Orders    Ambulatory Referral to Nutrition Services    ED (erectile dysfunction)     D/C Viagra 50mg QD PRN  Start Cialis 2 5mg QD as patient prefers daily Rx  Pending labs  Relevant Medications    tadalafil (CIALIS) 2 5 MG tablet    Other Relevant Orders    Testosterone, free, total    Vitamin D deficiency     Pending labs  Relevant Orders    Ambulatory Referral to Nutrition Services    Back pain     S/p OAA Spine evaluation  May need to return in future PRN? Hopefully Cymbalta 60mg daily to improve pain and will no longer need Flexeril 10mg QD PRN            Other Visit Diagnoses     Annual physical exam    -  Primary    Screening for diabetes mellitus        Relevant Orders    Hemoglobin A1C    Screening for cardiovascular condition        Relevant Orders    CBC and differential    Comprehensive metabolic panel    Lipid panel    LDL cholesterol, direct    BMI 27 0-27 9,adult        Relevant Orders    TSH, 3rd generation with Free T4 reflex    Hair loss        Myalgia        Relevant Orders    Vitamin D 25 hydroxy    False positive HIV serology        Encounter for screening for HIV        Relevant Orders    : HIV 1/2 AB/AG w Reflex [facility] for 2 yr old and above    : HIV 1/2 AB/AG w Reflex [facility] for 2 yr old and above    : HIV 1/2 AB/AG w Reflex [facility] for 2 yr old and above           Return in about 6 weeks (around 2/22/2023) for F/U HTN, HL, Anx/Dep, ED, LBP, Labs  Future Appointments   Date Time Provider Patricio Sanders   2/28/2023  9:40 AM Karthikeyan Bonilla, DO [facility]        Subjective:     Madisyn Li is a 49 y o  male who presents today for a follow-up on his chronic medical conditions  HPI:  Chief Complaint   Patient presents with   • New Patient Visit     Here to establish care  No problems or concerns at this time  • HM     Declines covid boosters at this time  Last colonoscopy was last year at [facility]  -- Above per clinical staff and reviewed  --      HPI      Today:      Physical    Not watching diet  +Exercise          Sees Dentist q6 months  Sees Optho q2 years  Health Maintenance   Topic Date Due   • BMI: Followup Plan  Never done   • COVID-19 Vaccine (3 - Booster for Deryl Anthony series) 04/12/2023 (Originally 3/23/2021)   • BMI: Adult  01/11/2024   • Annual Physical  01/11/2024   • Colorectal Cancer Screening  11/12/2030   • DTaP,Tdap,and Td Vaccines (2 - Td or Tdap) 12/01/2030   • HIV Screening  Completed   • Hepatitis C Screening  Completed   • Influenza Vaccine  Completed   • Pneumococcal Vaccine: Pediatrics (0 to 5 Years) and At-Risk Patients (6 to 59 Years)  Aged Out   • HIB Vaccine  Aged Out   • IPV Vaccine  Aged Out   • Hepatitis A Vaccine  Aged Out   • Meningococcal ACWY Vaccine  Aged Out   • HPV Vaccine  Aged Out         The following portions of the patient's history were reviewed and updated as appropriate: allergies, current medications, past family history, past medical history, past social history, past surgical history and problem list       Review of Systems     See other note          Current Outpatient Medications   Medication Sig Dispense Refill   • buPROPion (WELLBUTRIN XL) 300 mg 24 hr tablet Take 1 tablet (300 mg total) by mouth every morning 30 tablet 1   • Cholecalciferol (Vitamin D3) 125 MCG (5000 UT) TABS Take 5,000 Units by mouth daily     • [employer]S FIBER GUMMIES PO Take 2 tablets by mouth in the morning     • cyclobenzaprine (FLEXERIL) 10 mg tablet Take 1 tablet (10 mg total) by mouth daily as needed for muscle spasms 30 tablet 0   • DULoxetine (Cymbalta) 30 mg delayed release capsule Take 1 capsule (30 mg total) by mouth daily 7 capsule 0   • DULoxetine (CYMBALTA) 60 mg delayed release capsule Take 1 capsule (60 mg total) by mouth daily Start after taking 30mg daily x 1 week  30 capsule 1   • losartan (COZAAR) 100 MG tablet Take 1 tablet (100 mg total) by mouth daily 30 tablet 1   • tadalafil (CIALIS) 2 5 MG tablet Take 1 tablet (2 5 mg total) by mouth daily 30 tablet 1     No current facility-administered medications for this visit  Objective:  /80   Pulse 100   Temp (!) 97 3 °F (36 3 °C)   Resp 14   Ht 5' 10 5" (1 791 m)   Wt 87 3 kg (192 lb 6 4 oz)   SpO2 96%   BMI 27 22 kg/m²    Wt Readings from Last 3 Encounters:   01/11/23 87 3 kg (192 lb 6 4 oz)      BP Readings from Last 3 Encounters:   01/11/23 136/80   10/27/22 126/74          Physical Exam     Vitals and nursing note reviewed  Constitutional:       Appearance: Normal appearance  He is well-developed  HENT:      Head: Normocephalic and atraumatic  Right Ear: Tympanic membrane, ear canal and external ear normal       Left Ear: Tympanic membrane, ear canal and external ear normal       Nose: Nose normal       Right Sinus: No maxillary sinus tenderness or frontal sinus tenderness  Left Sinus: No maxillary sinus tenderness or frontal sinus tenderness  Mouth/Throat:      Mouth: Mucous membranes are moist       Pharynx: Oropharynx is clear  Uvula midline  Tonsils: No tonsillar exudate  Eyes:      Extraocular Movements: Extraocular movements intact        Conjunctiva/sclera: Conjunctivae normal       Pupils: Pupils are equal, round, and reactive to light  Cardiovascular:      Rate and Rhythm: Normal rate and regular rhythm  Pulses: Normal pulses  Heart sounds: Normal heart sounds  Pulmonary:      Effort: Pulmonary effort is normal       Breath sounds: Normal breath sounds  Abdominal:      General: Bowel sounds are normal  There is no distension  Palpations: Abdomen is soft  There is no mass  Tenderness: There is no abdominal tenderness  There is no guarding or rebound  Musculoskeletal:         General: Tenderness (Medial sacrum) present  No swelling  Normal range of motion  Cervical back: Neck supple  Right lower leg: No edema  Left lower leg: No edema  Comments: Negative SLR B/L  Back c full ROM  Lymphadenopathy:      Cervical: No cervical adenopathy  Skin:     Findings: No rash  Neurological:      General: No focal deficit present  Mental Status: He is alert and oriented to person, place, and time  Cranial Nerves: No cranial nerve deficit  Sensory: No sensory deficit  Motor: No weakness  Coordination: Coordination normal       Gait: Gait normal       Deep Tendon Reflexes: Reflexes normal    Psychiatric:         Mood and Affect: Mood normal          Behavior: Behavior normal          Thought Content: Thought content normal          Judgment: Judgment normal      Lab Results:      Lab Results   Component Value Date    ALT 24 10/27/2022     No results found for: URICACID  Invalid input(s): BASENAME Vitamin D    No results found       POCT Labs

## 2023-02-03 DIAGNOSIS — F32.0 CURRENT MILD EPISODE OF MAJOR DEPRESSIVE DISORDER, UNSPECIFIED WHETHER RECURRENT (HCC): ICD-10-CM

## 2023-02-03 DIAGNOSIS — F41.9 ANXIETY: ICD-10-CM

## 2023-02-03 DIAGNOSIS — M79.18 MYOFASCIAL PAIN SYNDROME OF LUMBAR SPINE: ICD-10-CM

## 2023-02-03 RX ORDER — DULOXETIN HYDROCHLORIDE 60 MG/1
60 CAPSULE, DELAYED RELEASE ORAL DAILY
Qty: 90 CAPSULE | Refills: 1 | Status: SHIPPED | OUTPATIENT
Start: 2023-02-03

## 2023-02-09 ENCOUNTER — LAB (OUTPATIENT)
Dept: LAB | Facility: CLINIC | Age: 48
End: 2023-02-09

## 2023-02-09 DIAGNOSIS — Z11.4 ENCOUNTER FOR SCREENING FOR HIV: ICD-10-CM

## 2023-02-09 DIAGNOSIS — N52.8 OTHER MALE ERECTILE DYSFUNCTION: ICD-10-CM

## 2023-02-09 DIAGNOSIS — M79.10 MYALGIA: ICD-10-CM

## 2023-02-09 DIAGNOSIS — E78.5 HYPERLIPIDEMIA, UNSPECIFIED HYPERLIPIDEMIA TYPE: ICD-10-CM

## 2023-02-09 DIAGNOSIS — R71.8 ELEVATED MCV: ICD-10-CM

## 2023-02-09 DIAGNOSIS — Z13.1 SCREENING FOR DIABETES MELLITUS: ICD-10-CM

## 2023-02-09 DIAGNOSIS — Z13.6 SCREENING FOR CARDIOVASCULAR CONDITION: ICD-10-CM

## 2023-02-09 DIAGNOSIS — F41.9 ANXIETY: ICD-10-CM

## 2023-02-09 DIAGNOSIS — F32.0 CURRENT MILD EPISODE OF MAJOR DEPRESSIVE DISORDER, UNSPECIFIED WHETHER RECURRENT (HCC): ICD-10-CM

## 2023-02-09 LAB
25(OH)D3 SERPL-MCNC: 44.3 NG/ML (ref 30–100)
ALBUMIN SERPL BCP-MCNC: 4.1 G/DL (ref 3.5–5)
ALP SERPL-CCNC: 73 U/L (ref 46–116)
ALT SERPL W P-5'-P-CCNC: 28 U/L (ref 12–78)
ANION GAP SERPL CALCULATED.3IONS-SCNC: 7 MMOL/L (ref 4–13)
AST SERPL W P-5'-P-CCNC: 17 U/L (ref 5–45)
BASOPHILS # BLD AUTO: 0.04 THOUSANDS/ÂΜL (ref 0–0.1)
BASOPHILS NFR BLD AUTO: 1 % (ref 0–1)
BILIRUB SERPL-MCNC: 0.63 MG/DL (ref 0.2–1)
BUN SERPL-MCNC: 21 MG/DL (ref 5–25)
CALCIUM SERPL-MCNC: 8.7 MG/DL (ref 8.3–10.1)
CHLORIDE SERPL-SCNC: 104 MMOL/L (ref 96–108)
CHOLEST SERPL-MCNC: 165 MG/DL
CO2 SERPL-SCNC: 27 MMOL/L (ref 21–32)
CREAT SERPL-MCNC: 1.14 MG/DL (ref 0.6–1.3)
EOSINOPHIL # BLD AUTO: 0.08 THOUSAND/ÂΜL (ref 0–0.61)
EOSINOPHIL NFR BLD AUTO: 1 % (ref 0–6)
ERYTHROCYTE [DISTWIDTH] IN BLOOD BY AUTOMATED COUNT: 12.4 % (ref 11.6–15.1)
FOLATE SERPL-MCNC: >20 NG/ML (ref 3.1–17.5)
GFR SERPL CREATININE-BSD FRML MDRD: 75 ML/MIN/1.73SQ M
GLUCOSE P FAST SERPL-MCNC: 95 MG/DL (ref 65–99)
HCT VFR BLD AUTO: 52.2 % (ref 36.5–49.3)
HDLC SERPL-MCNC: 37 MG/DL
HGB BLD-MCNC: 16.6 G/DL (ref 12–17)
HIV 1+2 AB+HIV1 P24 AG SERPL QL IA: NORMAL
HIV 2 AB SERPL QL IA: NORMAL
HIV1 AB SERPL QL IA: NORMAL
HIV1 P24 AG SERPL QL IA: NORMAL
IMM GRANULOCYTES # BLD AUTO: 0.04 THOUSAND/UL (ref 0–0.2)
IMM GRANULOCYTES NFR BLD AUTO: 1 % (ref 0–2)
LDLC SERPL CALC-MCNC: 87 MG/DL (ref 0–100)
LDLC SERPL DIRECT ASSAY-MCNC: 116 MG/DL (ref 0–100)
LYMPHOCYTES # BLD AUTO: 1.99 THOUSANDS/ÂΜL (ref 0.6–4.47)
LYMPHOCYTES NFR BLD AUTO: 32 % (ref 14–44)
MCH RBC QN AUTO: 31.9 PG (ref 26.8–34.3)
MCHC RBC AUTO-ENTMCNC: 31.8 G/DL (ref 31.4–37.4)
MCV RBC AUTO: 100 FL (ref 82–98)
MONOCYTES # BLD AUTO: 0.75 THOUSAND/ÂΜL (ref 0.17–1.22)
MONOCYTES NFR BLD AUTO: 12 % (ref 4–12)
NEUTROPHILS # BLD AUTO: 3.42 THOUSANDS/ÂΜL (ref 1.85–7.62)
NEUTS SEG NFR BLD AUTO: 53 % (ref 43–75)
NONHDLC SERPL-MCNC: 128 MG/DL
NRBC BLD AUTO-RTO: 0 /100 WBCS
PLATELET # BLD AUTO: 191 THOUSANDS/UL (ref 149–390)
PMV BLD AUTO: 10.4 FL (ref 8.9–12.7)
POTASSIUM SERPL-SCNC: 4.3 MMOL/L (ref 3.5–5.3)
PROT SERPL-MCNC: 7.7 G/DL (ref 6.4–8.4)
RBC # BLD AUTO: 5.21 MILLION/UL (ref 3.88–5.62)
SODIUM SERPL-SCNC: 138 MMOL/L (ref 135–147)
TRIGL SERPL-MCNC: 207 MG/DL
TSH SERPL DL<=0.05 MIU/L-ACNC: 4.06 UIU/ML (ref 0.45–4.5)
VIT B12 SERPL-MCNC: 739 PG/ML (ref 100–900)
WBC # BLD AUTO: 6.32 THOUSAND/UL (ref 4.31–10.16)

## 2023-02-09 NOTE — RESULT ENCOUNTER NOTE
Nurse to call lab to see if Vitamin B63 and Folic Acid can be added to current specimen  ^^^^    Unstable labs - will review with patient at upcoming appointment  Hyperlipidemia - Recommend lifestyle modifications  Elevated MCV - See above

## 2023-02-10 LAB
EST. AVERAGE GLUCOSE BLD GHB EST-MCNC: 111 MG/DL
HBA1C MFR BLD: 5.5 %
TESTOST FREE SERPL-MCNC: 7.1 PG/ML (ref 6.8–21.5)
TESTOST SERPL-MCNC: 261 NG/DL (ref 264–916)

## 2023-02-12 DIAGNOSIS — N52.8 OTHER MALE ERECTILE DYSFUNCTION: ICD-10-CM

## 2023-02-12 DIAGNOSIS — R79.89 LOW TESTOSTERONE IN MALE: Primary | ICD-10-CM

## 2023-02-12 NOTE — RESULT ENCOUNTER NOTE
Normal free testosterone, slightly low total testosterone  Nurse to see orders to repeat fasting free and total testosterone prior to next appointment 2/28/23  Message sent to patient via [employer] patient portal     Nurse to also call patient

## 2023-02-28 ENCOUNTER — OFFICE VISIT (OUTPATIENT)
Dept: FAMILY MEDICINE CLINIC | Facility: CLINIC | Age: 48
End: 2023-02-28

## 2023-02-28 VITALS
WEIGHT: 187.8 LBS | HEIGHT: 71 IN | TEMPERATURE: 96.1 F | RESPIRATION RATE: 14 BRPM | OXYGEN SATURATION: 97 % | HEART RATE: 81 BPM | DIASTOLIC BLOOD PRESSURE: 68 MMHG | SYSTOLIC BLOOD PRESSURE: 132 MMHG | BODY MASS INDEX: 26.29 KG/M2

## 2023-02-28 DIAGNOSIS — M47.816 LUMBAR SPONDYLOSIS: ICD-10-CM

## 2023-02-28 DIAGNOSIS — Q76.49 BERTOLOTTI'S SYNDROME: ICD-10-CM

## 2023-02-28 DIAGNOSIS — E55.9 VITAMIN D DEFICIENCY: ICD-10-CM

## 2023-02-28 DIAGNOSIS — M79.18 MYOFASCIAL PAIN SYNDROME OF LUMBAR SPINE: ICD-10-CM

## 2023-02-28 DIAGNOSIS — M54.50 CHRONIC MIDLINE LOW BACK PAIN WITHOUT SCIATICA: ICD-10-CM

## 2023-02-28 DIAGNOSIS — E66.3 OVERWEIGHT: ICD-10-CM

## 2023-02-28 DIAGNOSIS — I10 PRIMARY HYPERTENSION: Primary | ICD-10-CM

## 2023-02-28 DIAGNOSIS — R53.83 OTHER FATIGUE: ICD-10-CM

## 2023-02-28 DIAGNOSIS — G89.29 CHRONIC MIDLINE LOW BACK PAIN WITHOUT SCIATICA: ICD-10-CM

## 2023-02-28 DIAGNOSIS — F41.9 ANXIETY: ICD-10-CM

## 2023-02-28 DIAGNOSIS — E78.5 HYPERLIPIDEMIA, UNSPECIFIED HYPERLIPIDEMIA TYPE: ICD-10-CM

## 2023-02-28 DIAGNOSIS — R79.89 LOW TESTOSTERONE IN MALE: ICD-10-CM

## 2023-02-28 DIAGNOSIS — F32.0 CURRENT MILD EPISODE OF MAJOR DEPRESSIVE DISORDER, UNSPECIFIED WHETHER RECURRENT (HCC): ICD-10-CM

## 2023-02-28 DIAGNOSIS — N52.8 OTHER MALE ERECTILE DYSFUNCTION: ICD-10-CM

## 2023-02-28 RX ORDER — LOSARTAN POTASSIUM 100 MG/1
100 TABLET ORAL DAILY
Qty: 90 TABLET | Refills: 2 | Status: SHIPPED | OUTPATIENT
Start: 2023-02-28

## 2023-02-28 RX ORDER — MULTIVITAMIN
1 TABLET ORAL DAILY
COMMUNITY

## 2023-02-28 RX ORDER — DULOXETIN HYDROCHLORIDE 60 MG/1
60 CAPSULE, DELAYED RELEASE ORAL DAILY
Qty: 90 CAPSULE | Refills: 1
Start: 2023-02-28

## 2023-02-28 RX ORDER — BUPROPION HYDROCHLORIDE 300 MG/1
300 TABLET ORAL EVERY MORNING
Qty: 90 TABLET | Refills: 2 | Status: SHIPPED | OUTPATIENT
Start: 2023-02-28 | End: 2023-05-29

## 2023-02-28 RX ORDER — TADALAFIL 2.5 MG/1
2.5 TABLET ORAL DAILY
Qty: 90 TABLET | Refills: 2 | Status: SHIPPED | OUTPATIENT
Start: 2023-02-28

## 2023-02-28 RX ORDER — DULOXETIN HYDROCHLORIDE 30 MG/1
30 CAPSULE, DELAYED RELEASE ORAL DAILY
Qty: 30 CAPSULE | Refills: 1 | Status: SHIPPED | OUTPATIENT
Start: 2023-02-28

## 2023-02-28 NOTE — ASSESSMENT & PLAN NOTE
S/p [facility] Spine evaluation  May need to return in future PRN? Hopefully Cymbalta 90mg daily to improve pain and will no longer need Flexeril 10mg QD PRN  Patient is considering acupuncture

## 2023-02-28 NOTE — PATIENT INSTRUCTIONS
Weight Management   AMBULATORY CARE:   Why it is important to manage your weight:  Being overweight increases your risk of health conditions such as heart disease, high blood pressure, type 2 diabetes, and certain types of cancer  It can also increase your risk for osteoarthritis, sleep apnea, and other respiratory problems  Aim for a slow, steady weight loss  Even a small amount of weight loss can lower your risk of health problems  Risks of being overweight:  Extra weight can cause many health problems, including the following:  • Diabetes (high blood sugar level)    • High blood pressure or high cholesterol    • Heart disease    • Stroke    • Gallbladder or liver disease    • Cancer of the colon, breast, prostate, liver, or kidney    • Sleep apnea    • Arthritis or gout    Screening  is done to check for health conditions before you have signs or symptoms  If you are 28to 79years old, your blood sugar level may be checked every 3 years for signs of prediabetes or diabetes  Your healthcare provider will check your blood pressure at each visit  High blood pressure can lead to a stroke or other problems  Your provider may check for signs of heart disease, cancer, or other health problems  How to lose weight safely:  A safe and healthy way to lose weight is to eat fewer calories and get regular exercise  • You can lose up about 1 pound a week by decreasing the number of calories you eat by 500 calories each day  You can decrease calories by eating smaller portion sizes or by cutting out high-calorie foods  Read labels to find out how many calories are in the foods you eat  • You can also burn calories with exercise such as walking, swimming, or biking  You will be more likely to keep weight off if you make these changes part of your lifestyle  Exercise at least 30 minutes per day on most days of the week   You can also fit in more physical activity by taking the stairs instead of the elevator or parking farther away from stores  Ask your healthcare provider about the best exercise plan for you  Healthy meal plan for weight management:  A healthy meal plan includes a variety of foods, contains fewer calories, and helps you stay healthy  A healthy meal plan includes the following:     • Eat whole-grain foods more often  A healthy meal plan should contain fiber  Fiber is the part of grains, fruits, and vegetables that is not broken down by your body  Whole-grain foods are healthy and provide extra fiber in your diet  Some examples of whole-grain foods are whole-wheat breads and pastas, oatmeal, brown rice, and bulgur  • Eat a variety of vegetables every day  Include dark, leafy greens such as spinach, kale, tsering greens, and mustard greens  Eat yellow and orange vegetables such as carrots, sweet potatoes, and winter squash  • Eat a variety of fruits every day  Choose fresh or canned fruit (canned in its own juice or light syrup) instead of juice  Fruit juice has very little or no fiber  • Eat low-fat dairy foods  Drink fat-free (skim) milk or 1% milk  Eat fat-free yogurt and low-fat cottage cheese  Try low-fat cheeses such as mozzarella and other reduced-fat cheeses  • Choose meat and other protein foods that are low in fat  Choose beans or other legumes such as split peas or lentils  Choose fish, skinless poultry (chicken or turkey), or lean cuts of red meat (beef or pork)  Before you cook meat or poultry, cut off any visible fat  • Use less fat and oil  Try baking foods instead of frying them  Add less fat, such as margarine, sour cream, regular salad dressing and mayonnaise to foods  Eat fewer high-fat foods  Some examples of high-fat foods include french fries, doughnuts, ice cream, and cakes  • Eat fewer sweets  Limit foods and drinks that are high in sugar  This includes candy, cookies, regular soda, and sweetened drinks  Ways to decrease calories:   • Eat smaller portions  ? Use a small plate with smaller servings  ? Do not eat second helpings  ? When you eat at a restaurant, ask for a box and place half of your meal in the box before you eat  ? Share an entrée with someone else  • Replace high-calorie snacks with healthy, low-calorie snacks  ? Choose fresh fruit, vegetables, fat-free rice cakes, or air-popped popcorn instead of potato chips, nuts, or chocolate  ? Choose water or calorie-free drinks instead of soda or sweetened drinks  • Do not shop for groceries when you are hungry  You may be more likely to make unhealthy food choices  Take a grocery list of healthy foods and shop after you have eaten  • Eat regular meals  Do not skip meals  Skipping meals can lead to overeating later in the day  This can make it harder for you to lose weight  Eat a healthy snack in place of a meal if you do not have time to eat a regular meal  Talk with a dietitian to help you create a meal plan and schedule that is right for you  Other things to consider as you try to lose weight:   • Be aware of situations that may give you the urge to overeat, such as eating while watching television  Find ways to avoid these situations  For example, read a book, go for a walk, or do crafts  • Meet with a weight loss support group or friends who are also trying to lose weight  This may help you stay motivated to continue working on your weight loss goals  © Copyright Uriel [employer] 2022 Information is for End User's use only and may not be sold, redistributed or otherwise used for commercial purposes  The above information is an  only  It is not intended as medical advice for individual conditions or treatments  Talk to your doctor, nurse or pharmacist before following any medical regimen to see if it is safe and effective for you

## 2023-02-28 NOTE — ASSESSMENT & PLAN NOTE
Daily Note     Today's date: 10/13/2022  Patient name: Paulette Byrne  : 1976  MRN: [id number]  Referring provider: Sheree Curling, PA-C  Dx:   Encounter Diagnosis     ICD-10-CM    1  Flexor tendon laceration of finger with open wound, subsequent encounter  S56 129D     S61 209D                   Subjective: My finger still feels stiff      Objective: See treatment diary below      Assessment: Tolerated treatment well  Patient exhibited good technique with therapeutic exercises  Patient able to achieve flat fist position after tx      Plan: Continue per plan of care           Precautions Zone 2 [facility] Flexor Tendon Laceration       Date 9/1 9/8 9/15 10/6 10/13   Visit 6 7 8 9 Re eval 10   Manuals        Scar Massage 5'   10' 5'   IASTM 3'  7' 5' 5'   Edema massage 5' 8'  5'  5'    Splint modification DC splint Static, dorsal PIP flexion in 20 degrees      Neuro Re-Ed                                                                 Ther Ex        Tendon Glide  x20 ea x20 ea x20 x20   FDS Glide x20 x20 x20 x20 x20   Wrist Flex/Ext  RD/UD W/ MCP Flexed x20 x20 ea  x20 x20   Wrist Tenodesis     x20   HG      35# x 20   Place and Hold Composite Fist x20 x20 x20 x20 x20   PROM SF 12' 10' 10' 7' 10'   Sustained grasp w/pencils   2 sets             Ther Activity        Grooved Peg Board        Opposition                        Modalities        MH 5' 5' 5' 5' 5'   CP S/p [facility]AA Spine evaluation  May need to return in future PRN? Hopefully Cymbalta 90mg daily to improve pain and will no longer need Flexeril 10mg QD PRN  Patient is considering acupuncture

## 2023-02-28 NOTE — PROGRESS NOTES
Assessment/Plan:  Problem List Items Addressed This Visit        Cardiovascular and Mediastinum    Hypertension - Primary     Borderline BP today  Check blood pressure outside of office  Recommend lifestyle modifications  Relevant Medications    losartan (COZAAR) 100 MG tablet       Nervous and Auditory    Bertolotti's syndrome     S/p [facility]AA Spine evaluation  May need to return in future PRN? Hopefully Cymbalta 90mg daily to improve pain and will no longer need Flexeril 10mg QD PRN  Patient is considering acupuncture  Musculoskeletal and Integument    Lumbar spondylosis     S/p [facility] Spine evaluation  May need to return in future PRN? Hopefully Cymbalta 90mg daily to improve pain and will no longer need Flexeril 10mg QD PRN  Patient is considering acupuncture  Myofascial pain syndrome of lumbar spine     S/p [facility]AA Spine evaluation  May need to return in future PRN? Hopefully Cymbalta 90mg daily to improve pain and will no longer need Flexeril 10mg QD PRN  Patient is considering acupuncture  Relevant Medications    DULoxetine (Cymbalta) 30 mg delayed release capsule    DULoxetine (CYMBALTA) 60 mg delayed release capsule       Other    Anxiety     Improved  Increase Cymbalta 90mg QD, which may benefit mood and back pain  Continue counseling  Relevant Medications    DULoxetine (Cymbalta) 30 mg delayed release capsule    DULoxetine (CYMBALTA) 60 mg delayed release capsule    Other Relevant Orders    Comprehensive metabolic panel    Depression     Improved  Increase Cymbalta 90mg QD, which may benefit mood and back pain  Continue counseling  Relevant Medications    DULoxetine (Cymbalta) 30 mg delayed release capsule    DULoxetine (CYMBALTA) 60 mg delayed release capsule    buPROPion (WELLBUTRIN XL) 300 mg 24 hr tablet    Other Relevant Orders    Comprehensive metabolic panel    Hyperlipidemia     Stable s statin  Recommend lifestyle modifications  The 10-year ASCVD risk score (Dee KING, et al , 2019) is: 3 6%    Values used to calculate the score:      Age: 49 years      Sex: Male      Is Non- : No      Diabetic: No      Tobacco smoker: No      Systolic Blood Pressure: 019 mmHg      Is BP treated: Yes      HDL Cholesterol: 37 mg/dL      Total Cholesterol: 165 mg/dL           Overweight     Improved  Recommend lifestyle modifications  ED (erectile dysfunction)     Stable  Continue Cialis 2 5mg QD  Relevant Medications    tadalafil (CIALIS) 2 5 MG tablet    Vitamin D deficiency     Stable on MVI and OTC Vitamin D 5,000IU daily  Back pain     S/p [facility] Spine evaluation  May need to return in future PRN? Hopefully Cymbalta 90mg daily to improve pain and will no longer need Flexeril 10mg QD PRN  Patient is considering acupuncture  Other Visit Diagnoses     Other fatigue        Pending repeat Testosterone  If again low, will refer to Uro for possible testosterone replacement  Other labs stable  Depression may be contributing  Low testosterone in male        Pending repeat Testosterone  If again low, will refer to Uro for possible testosterone replacement  BMI 26 0-26 9,adult               Return in 6 weeks (on 4/11/2023) for F/U HTN, HL, Anx/Dep, LBP, ED, Labs  Future Appointments   Date Time Provider Patricio Sanders   4/11/2023  9:00 AM Parsi Buckley DO [facility]   5/3/2023  1:00 PM AN DIETITIAN AN [facility]        Subjective:     Dennie Lemme is a 49 y o  male who presents today for a follow-up on his chronic medical conditions  HPI:  Chief Complaint   Patient presents with   • Follow-up     F/U HTN, HL, Anx/Dep, ED, LBP, Labs  No new problems or concerns  Labs done 2/9/23       -- Above per clinical staff and reviewed  --      HPI      Today:    Return in about 6 weeks (around 2/22/2023) for F/U HTN, HL, Anx/Dep, ED, LBP, Labs      Patient did not complete labs 2023      Overweight - Would like to see Nutritionist - Pending 5/3/23  Trying to watch diet  He cut back on drinking soda, but eats fast food  +Exercise - [facility] for 1 hour, 2-3 times per week        HTN - On Losartan 100mg QD  No other HTN meds previously  No BP check outside of office         Hyperlipidemia - No statin previously      H/O  Grave's Disease / Hyperthyroidism - s/p Tapazole x 2 years, then radioactive iodine, which resulted in euthyroid status        Anxiety / Depression - On Cymbalta 60mg QD x 6 weeks  Mood is unchanged and is "not great' - since summer 2022 - increased stress at work and in marriage  Previous Management per Psych at [facility] E - last seen   - F/U PRN as was D/C to PCP Management  On Wellbutrin XL 300mg QD in AM since   Previously on Zoloft, Prozac - did fine with these meds, Lexapro 20mg QD ineffective  No other mood meds or higher dose previously  Sees counseling q2 weeks - marriage counseling with wife, and individual - helpful  Next appt   Poor social supports  No SI/HI/AH/VH        PHQ-2/9 Depression Screening    Little interest or pleasure in doing things: 1 - several days  Feeling down, depressed, or hopeless: 1 - several days  Trouble falling or staying asleep, or sleeping too much: 1 - several days  Feeling tired or having little energy: 2 - more than half the days  Poor appetite or overeatin - several days  Feeling bad about yourself - or that you are a failure or have let yourself or your family down: 1 - several days  Trouble concentrating on things, such as reading the newspaper or watching television: 0 - not at all  Moving or speaking so slowly that other people could have noticed   Or the opposite - being so fidgety or restless that you have been moving around a lot more than usual: 0 - not at all  Thoughts that you would be better off dead, or of hurting yourself in some way: 0 - not at all  PHQ-9 Score: 7   PHQ-9 Interpretation: Mild depression          CORA-7 Flowsheet Screening    Flowsheet Row Most Recent Value   Over the last 2 weeks, how often have you been bothered by any of the following problems? Feeling nervous, anxious, or on edge 0   Not being able to stop or control worrying 0   Worrying too much about different things 0   Trouble relaxing 1   Being so restless that it is hard to sit still 0   Becoming easily annoyed or irritable 0   Feeling afraid as if something awful might happen 0   CORA-7 Total Score 1           ED - On Cialis 2 5mg QD x 6 weeks - helpful  Patient feels due to Lexapro and Wellbutrin  D/C Viagra 50mg QD PRN - helpful, but desired daily Rx  Patient is interested in daily Cialis  He is unsure if Rx is covered by insurance  No other ED meds or higher doses previously      Chronic Lower Back Pain / Lumbar Spondylosis / Myofascial Pain Syndrome of Lumbar Spine / Bertolotti's Syndrome  - On Cymbalta 60mg QD x 6 weeks  No change in back pain  Has daily spasms on medial sacrum, radiates into B/L buttocks  Injured his back in 2015 during  training in [place]  He is using Flexeril 10mg 1/2 -1 daily PRN - taking 3-4 times per week  Previously seen Db 55 2021  S/p ALEC s benefit  Last MRI in [place] in 1247-1022  Surgery was not advised per advised  Last PT in [place] 2018 s benefit        Vitamin D Deficiency - No Drisdol  Taking MVI, but taking OTC Vitamin D 5,000IU daily        H/O False Positive HIV test - s/p [facility] e-consult per Dr Mooney [facility] 10/7/22, who advised ID F/U vs  PCP repeat the testing at three months, six months and one year, and include HIV PCR testing with the routine screening if it does not already reflex that way       Normal colonoscopy 11/12/20 by GI Dr Sarah Givens @ [facility]  Repeat in 10 years  From previous note:    Controlled Substance Review     [place] PDMP or [place]  reviewed: No red flags were identified; safe to proceed with prescription     Hair Loss - Wants to know if Nutrafol is safe  He had not tried Rogaine yet        Overweight - Would like to see Nutritionist   Not watching diet  Drinks soda, eats fast food  +Exercise - [place] for 1 hours 2-3 times per week        HTN - On Losartan 100mg QD  No other HTN meds previously  No BP check outside of office         Hyperlipidemia - No statin previously      H/O  Grave's Disease / Hyperthyroidism - s/p Tapazole x 2 years, then radioactive iodine, which resulted in euthyroid status        Anxiety / Depression - Mood is "not great' - since summer 2022 - increased stress at work and in marriage  Previous Management per Psych at [facility] E - last seen   - F/U PRN as was D/C to PCP Management  On Lexapro 20mg QD since  and Wellbutrin XL 300mg QD in AM since   Previously on Zoloft, Prozac - did fine with these meds  No other mood meds or higher dose previously  Sees counseling q2 weeks  Next appt          PHQ-2/9 Depression Screening       Little interest or pleasure in doing things: 2 - more than half the days  Feeling down, depressed, or hopeless: 2 - more than half the days  Trouble falling or staying asleep, or sleeping too much: 2 - more than half the days  Feeling tired or having little energy: 2 - more than half the days  Poor appetite or overeatin - not at all  Feeling bad about yourself - or that you are a failure or have let yourself or your family down: 1 - several days  Trouble concentrating on things, such as reading the newspaper or watching television: 0 - not at all  Moving or speaking so slowly that other people could have noticed   Or the opposite - being so fidgety or restless that you have been moving around a lot more than usual: 0 - not at all  Thoughts that you would be better off dead, or of hurting yourself in some way: 0 - not at all  PHQ-2 Score: 4  PHQ-2 Interpretation: POSITIVE depression screen  PHQ-9 Score: 9   PHQ-9 Interpretation: Mild depression                    CORA-7 Flowsheet Screening    Flowsheet Row Most Recent Value   Over the last 2 weeks, how often have you been bothered by any of the following problems?     Feeling nervous, anxious, or on edge 1   Not being able to stop or control worrying 2   Worrying too much about different things 2   Trouble relaxing 2   Being so restless that it is hard to sit still 0   Becoming easily annoyed or irritable 1   Feeling afraid as if something awful might happen 0   CORA-7 Total Score 8          MDQ:  0, Asynchronous, No Problem     ED - Patient feels due to Lexapro and Wellbutrin  On Viagra 50mg QD PRN - helpful  Patient is interested in daily Cialis  He is unsure if Rx is covered by insurance  No other ED meds or higher doses previously      Chronic Lower Back Pain / Lumbar Spondylosis / Myofascial Pain Syndrome of Lumbar Spine / Bertolotti's Syndrome  - Has daily spasms on medical sacrum, radiates into B/L buttocks  Injured his back in 2015 during  training in [place]  He is using Flexeril 10mg 1/2 -1 daily PRN - taking 3-4 times per week  Previously seen Db 55 2021  S/p ALEC s benefit  Last MRI in [place] in 9744-7730  Surgery was not advised per advised  Last PT in [place] 2018 s benefit        Vitamin D Deficiency - No Drisdol  Not taking MVI, but taking OTC Vitamin D 5,000IU daily        H/O False Positive HIV test - s/p [facility] e-consult per Dr Annalee Manuel 10/7/22, who advised ID F/U vs  PCP repeat the testing at three months, six months and one year, and include HIV PCR testing with the routine screening if it does not already reflex that way       Normal colonoscopy 11/12/20 by GI Dr Wood Fisher @ [facility]  Repeat in 10 years        Reviewed:  Labs 2/9/23      Sees Dentist q6 months  Sees Optho q2 years            The following portions of the patient's history were reviewed and updated as appropriate: allergies, current medications, past family history, past medical history, past social history, past surgical history and problem list       Review of Systems   Constitutional: Positive for fatigue  Negative for appetite change, chills, diaphoresis and fever  Respiratory: Negative for chest tightness and shortness of breath  Cardiovascular: Negative for chest pain  Gastrointestinal: Negative for abdominal pain, blood in stool, diarrhea, nausea and vomiting  Genitourinary: Negative for dysuria  Musculoskeletal: Positive for back pain  Current Outpatient Medications   Medication Sig Dispense Refill   • buPROPion (WELLBUTRIN XL) 300 mg 24 hr tablet Take 1 tablet (300 mg total) by mouth every morning 90 tablet 2   • Cholecalciferol (Vitamin D3) 125 MCG (5000 UT) TABS Take 5,000 Units by mouth daily     • [employer] FIBER GUMMIES PO Take 2 tablets by mouth in the morning     • cyclobenzaprine (FLEXERIL) 10 mg tablet Take 1 tablet (10 mg total) by mouth daily as needed for muscle spasms 30 tablet 0   • DULoxetine (Cymbalta) 30 mg delayed release capsule Take 1 capsule (30 mg total) by mouth daily Take with 60mg for a total of 90mg daily  30 capsule 1   • DULoxetine (CYMBALTA) 60 mg delayed release capsule Take 1 capsule (60 mg total) by mouth daily Take with 30mg for a total of 90mg daily  90 capsule 1   • losartan (COZAAR) 100 MG tablet Take 1 tablet (100 mg total) by mouth daily 90 tablet 2   • Multiple Vitamin (multivitamin) tablet Take 1 tablet by mouth daily     • tadalafil (CIALIS) 2 5 MG tablet Take 1 tablet (2 5 mg total) by mouth daily 90 tablet 2     No current facility-administered medications for this visit         Objective:  /68   Pulse 81   Temp (!) 96 1 °F (35 6 °C)   Resp 14   Ht 5' 10 5" (1 791 m)   Wt 85 2 kg (187 lb 12 8 oz)   SpO2 97%   BMI 26 57 kg/m²    Wt Readings from Last 3 Encounters:   02/28/23 85 2 kg (187 lb 12 8 oz)   01/11/23 87 3 kg (192 lb 6 4 oz)      BP Readings from Last 3 Encounters:   02/28/23 132/68   01/11/23 136/80   10/27/22 126/74 Physical Exam  Vitals and nursing note reviewed  Constitutional:       Appearance: Normal appearance  He is well-developed  HENT:      Head: Normocephalic and atraumatic  Nose:      Right Sinus: No maxillary sinus tenderness or frontal sinus tenderness  Left Sinus: No maxillary sinus tenderness or frontal sinus tenderness  Mouth/Throat:      Pharynx: Uvula midline  Tonsils: No tonsillar exudate  Cardiovascular:      Rate and Rhythm: Normal rate and regular rhythm  Pulses: Normal pulses  Heart sounds: Normal heart sounds  Pulmonary:      Effort: Pulmonary effort is normal       Breath sounds: Normal breath sounds  Musculoskeletal:         General: Tenderness (Medial sacrum) present  No swelling  Normal range of motion  Cervical back: Neck supple  Right lower leg: No edema  Left lower leg: No edema  Lymphadenopathy:      Cervical: No cervical adenopathy  Skin:     Findings: No rash  Neurological:      General: No focal deficit present  Mental Status: He is alert and oriented to person, place, and time  Psychiatric:         Attention and Perception: Attention and perception normal          Mood and Affect: Mood is depressed  Speech: Speech normal          Behavior: Behavior normal  Behavior is cooperative  Thought Content:  Thought content normal          Cognition and Memory: Cognition and memory normal          Judgment: Judgment normal          Lab Results:      Lab Results   Component Value Date    WBC 6 32 02/09/2023    HGB 16 6 02/09/2023    HCT 52 2 (H) 02/09/2023     02/09/2023    TRIG 207 (H) 02/09/2023    HDL 37 (L) 02/09/2023    LDLDIRECT 116 (H) 02/09/2023    ALT 28 02/09/2023    AST 17 02/09/2023    K 4 3 02/09/2023     02/09/2023    CREATININE 1 14 02/09/2023    BUN 21 02/09/2023    CO2 27 02/09/2023    GLUF 95 02/09/2023    HGBA1C 5 5 02/09/2023     No results found for: URICACID  Invalid input(s): BASENAME Vitamin D    No results found  POCT Labs                       BMI Counseling: Body mass index is 26 57 kg/m²  The BMI is above normal  Nutrition recommendations include 3-5 servings of fruits/vegetables daily  Exercise recommendations include exercising 3-5 times per week

## 2023-02-28 NOTE — ASSESSMENT & PLAN NOTE
Stable s statin  Recommend lifestyle modifications       The 10-year ASCVD risk score (Dee KING, et al , 2019) is: 3 6%    Values used to calculate the score:      Age: 49 years      Sex: Male      Is Non- : No      Diabetic: No      Tobacco smoker: No      Systolic Blood Pressure: 684 mmHg      Is BP treated: Yes      HDL Cholesterol: 37 mg/dL      Total Cholesterol: 165 mg/dL

## 2023-03-13 ENCOUNTER — LAB (OUTPATIENT)
Dept: LAB | Facility: CLINIC | Age: 48
End: 2023-03-13

## 2023-03-13 DIAGNOSIS — F41.9 ANXIETY: ICD-10-CM

## 2023-03-13 DIAGNOSIS — F32.0 CURRENT MILD EPISODE OF MAJOR DEPRESSIVE DISORDER, UNSPECIFIED WHETHER RECURRENT (HCC): ICD-10-CM

## 2023-03-13 DIAGNOSIS — R79.89 LOW TESTOSTERONE IN MALE: ICD-10-CM

## 2023-03-13 DIAGNOSIS — N52.8 OTHER MALE ERECTILE DYSFUNCTION: ICD-10-CM

## 2023-03-13 DIAGNOSIS — Z11.4 ENCOUNTER FOR SCREENING FOR HIV: ICD-10-CM

## 2023-03-13 LAB
ALBUMIN SERPL BCP-MCNC: 4.1 G/DL (ref 3.5–5)
ALP SERPL-CCNC: 73 U/L (ref 46–116)
ALT SERPL W P-5'-P-CCNC: 23 U/L (ref 12–78)
ANION GAP SERPL CALCULATED.3IONS-SCNC: 3 MMOL/L (ref 4–13)
AST SERPL W P-5'-P-CCNC: 14 U/L (ref 5–45)
BILIRUB SERPL-MCNC: 0.49 MG/DL (ref 0.2–1)
BUN SERPL-MCNC: 15 MG/DL (ref 5–25)
CALCIUM SERPL-MCNC: 9.8 MG/DL (ref 8.3–10.1)
CHLORIDE SERPL-SCNC: 107 MMOL/L (ref 96–108)
CO2 SERPL-SCNC: 27 MMOL/L (ref 21–32)
CREAT SERPL-MCNC: 1.23 MG/DL (ref 0.6–1.3)
GFR SERPL CREATININE-BSD FRML MDRD: 68 ML/MIN/1.73SQ M
GLUCOSE P FAST SERPL-MCNC: 123 MG/DL (ref 65–99)
HIV 1+2 AB+HIV1 P24 AG SERPL QL IA: NORMAL
HIV 2 AB SERPL QL IA: NORMAL
HIV1 AB SERPL QL IA: NORMAL
HIV1 P24 AG SERPL QL IA: NORMAL
POTASSIUM SERPL-SCNC: 4.4 MMOL/L (ref 3.5–5.3)
PROT SERPL-MCNC: 7.3 G/DL (ref 6.4–8.4)
SODIUM SERPL-SCNC: 137 MMOL/L (ref 135–147)

## 2023-03-13 NOTE — RESULT ENCOUNTER NOTE
Unstable labs - will review with patient at upcoming appointment  IFG - Was pt fasting? HgA1C 5 5 on 2/9/23

## 2023-03-14 LAB
TESTOST FREE SERPL-MCNC: 6.5 PG/ML (ref 6.8–21.5)
TESTOST SERPL-MCNC: 270 NG/DL (ref 264–916)

## 2023-03-15 DIAGNOSIS — N52.8 OTHER MALE ERECTILE DYSFUNCTION: ICD-10-CM

## 2023-03-15 DIAGNOSIS — R79.89 LOW TESTOSTERONE IN MALE: Primary | ICD-10-CM

## 2023-03-15 NOTE — RESULT ENCOUNTER NOTE
Repeat total testosterone is normal, but now free testosterone is low  Nurse to see orders for urology consult for further evaluation  Message sent to patient via [email or web address] patient portal     Nurse to also call patient

## 2023-03-27 DIAGNOSIS — F41.9 ANXIETY: ICD-10-CM

## 2023-03-27 DIAGNOSIS — M79.18 MYOFASCIAL PAIN SYNDROME OF LUMBAR SPINE: ICD-10-CM

## 2023-03-27 DIAGNOSIS — F32.0 CURRENT MILD EPISODE OF MAJOR DEPRESSIVE DISORDER, UNSPECIFIED WHETHER RECURRENT (HCC): ICD-10-CM

## 2023-03-27 RX ORDER — DULOXETIN HYDROCHLORIDE 30 MG/1
30 CAPSULE, DELAYED RELEASE ORAL DAILY
Qty: 90 CAPSULE | Refills: 1 | Status: SHIPPED | OUTPATIENT
Start: 2023-03-27

## 2023-04-05 ENCOUNTER — TELEPHONE (OUTPATIENT)
Dept: UROLOGY | Facility: AMBULATORY SURGERY CENTER | Age: 48
End: 2023-04-05

## 2023-04-05 NOTE — TELEPHONE ENCOUNTER
Please Triage  New Patient    What is the reason for the patient’s appointment? Referral   R79 89 (ICD-10-CM) - Low testosterone in male   N52 8 (ICD-10-CM) - Other male erectile dysfunction       What office location does the patient prefer? [place]    Imaging/Lab Results: yes    Do we accept the patient's insurance or is the patient Self-Pay? Insurance Provider: [employer]  Plan Type/Number:  Member ID#: Has the patient had any previous Urologist(s)? no    Have patient records been requested? If not are records showing in Epic: no    Has the patient had any outside testing done? no    Does the patient have a personal history of cancer?  no

## 2023-04-25 ENCOUNTER — APPOINTMENT (OUTPATIENT)
Dept: RADIOLOGY | Facility: MEDICAL CENTER | Age: 48
End: 2023-04-25

## 2023-04-25 ENCOUNTER — CONSULT (OUTPATIENT)
Dept: OBGYN CLINIC | Facility: MEDICAL CENTER | Age: 48
End: 2023-04-25

## 2023-04-25 VITALS
DIASTOLIC BLOOD PRESSURE: 68 MMHG | HEART RATE: 112 BPM | SYSTOLIC BLOOD PRESSURE: 99 MMHG | BODY MASS INDEX: 25.48 KG/M2 | HEIGHT: 71 IN | WEIGHT: 182 LBS

## 2023-04-25 DIAGNOSIS — M54.50 CHRONIC MIDLINE LOW BACK PAIN WITHOUT SCIATICA: Primary | ICD-10-CM

## 2023-04-25 DIAGNOSIS — G89.29 CHRONIC MIDLINE LOW BACK PAIN WITHOUT SCIATICA: Primary | ICD-10-CM

## 2023-04-25 DIAGNOSIS — M54.16 LUMBAR RADICULOPATHY: ICD-10-CM

## 2023-04-25 DIAGNOSIS — M51.37 DDD (DEGENERATIVE DISC DISEASE), LUMBOSACRAL: ICD-10-CM

## 2023-04-25 DIAGNOSIS — Q76.49 BERTOLOTTI'S SYNDROME: ICD-10-CM

## 2023-04-25 RX ORDER — METHYLPREDNISOLONE 4 MG/1
TABLET ORAL
Qty: 1 EACH | Refills: 0 | Status: SHIPPED | OUTPATIENT
Start: 2023-04-25

## 2023-04-25 RX ORDER — MELOXICAM 15 MG/1
15 TABLET ORAL DAILY
Qty: 30 TABLET | Refills: 0 | Status: SHIPPED | OUTPATIENT
Start: 2023-04-25

## 2023-04-25 NOTE — PROGRESS NOTES
Assessment/Plan:    Diagnoses and all orders for this visit:    Chronic midline low back pain without sciatica  -     Ambulatory Referral to Orthopedic Surgery  -     Ambulatory Referral to Orthopedic Surgery; Future  -     methylPREDNISolone 4 MG tablet therapy pack; Use as directed on package  -     meloxicam (MOBIC) 15 mg tablet; Take 1 tablet (15 mg total) by mouth daily  -     Ambulatory Referral to Physical Therapy; Future    DDD (degenerative disc disease), lumbosacral  -     XR spine lumbar 2 or 3 views injury; Future  -     Ambulatory Referral to Orthopedic Surgery; Future  -     methylPREDNISolone 4 MG tablet therapy pack; Use as directed on package  -     meloxicam (MOBIC) 15 mg tablet; Take 1 tablet (15 mg total) by mouth daily  -     Ambulatory Referral to Physical Therapy; Future    Bertolotti's syndrome  -     Ambulatory Referral to Orthopedic Surgery  -     Ambulatory Referral to Orthopedic Surgery; Future  -     methylPREDNISolone 4 MG tablet therapy pack; Use as directed on package  -     meloxicam (MOBIC) 15 mg tablet; Take 1 tablet (15 mg total) by mouth daily  -     Ambulatory Referral to Physical Therapy; Future    Referred to pain management and orthopedic spine surgeon for chronic lower back pain  I have reviewed prior MRI from 2020 as well as obtained and reviewed x-rays of the lumbar spine today showing L5-S1 degenerative disc disease  We will try a Medrol Dosepak and transition to meloxicam for pain control  Return if symptoms worsen or fail to improve  Subjective:   Patient ID: Megan Berman is a 49 y o  male  NP presents for chronic lower midline back and bilateral glute pain and spasms ongoing for approximately 8 years since an injury  He has undergone MRI, PT, steroid injections and ablations but continues with his symptoms    He is currently taking a muscle relaxant for his pain      Review of Systems    The following portions of the patient's chart were reviewed "and updated as appropriate: Allergy:  No Known Allergies    Medications:    Current Outpatient Medications:   •  Cholecalciferol (Vitamin D3) 125 MCG (5000 UT) TABS, Take 5,000 Units by mouth daily, Disp: , Rfl:   •  [employer] FIBER GUMMIES PO, Take 2 tablets by mouth in the morning, Disp: , Rfl:   •  cyclobenzaprine (FLEXERIL) 10 mg tablet, Take 1 tablet (10 mg total) by mouth daily as needed for muscle spasms, Disp: 30 tablet, Rfl: 0  •  losartan (COZAAR) 100 MG tablet, Take 1 tablet (100 mg total) by mouth daily, Disp: 90 tablet, Rfl: 2  •  meloxicam (MOBIC) 15 mg tablet, Take 1 tablet (15 mg total) by mouth daily, Disp: 30 tablet, Rfl: 0  •  methylPREDNISolone 4 MG tablet therapy pack, Use as directed on package, Disp: 1 each, Rfl: 0  •  Multiple Vitamin (multivitamin) tablet, Take 1 tablet by mouth daily, Disp: , Rfl:   •  tadalafil (CIALIS) 2 5 MG tablet, Take 1 tablet (2 5 mg total) by mouth daily, Disp: 90 tablet, Rfl: 2  •  venlafaxine (EFFEXOR-XR) 37 5 mg 24 hr capsule, 1 pill by mouth daily x 1 week, then increase to 2 pills by mouth daily thereafter , Disp: 60 capsule, Rfl: 1    Patient Active Problem List   Diagnosis   • Anxiety   • Depression   • Hypertension   • Hyperlipidemia   • Overweight   • ED (erectile dysfunction)   • Vitamin D deficiency   • Bertolotti's syndrome   • Lumbar spondylosis   • Myofascial pain syndrome of lumbar spine   • Back pain       Objective:  BP 99/68   Pulse (!) 112   Ht 5' 10 5\" (1 791 m)   Wt 82 6 kg (182 lb)   BMI 25 75 kg/m²     Back Exam     Other   Gait: antalgic             Physical Exam  Vitals reviewed  Constitutional:       Appearance: He is well-developed  HENT:      Head: Normocephalic and atraumatic  Eyes:      Conjunctiva/sclera: Conjunctivae normal    Pulmonary:      Effort: Pulmonary effort is normal    Musculoskeletal:      Cervical back: Neck supple  Skin:     General: Skin is warm and dry     Neurological:      Mental Status: He is alert and " oriented to person, place, and time  Psychiatric:         Behavior: Behavior normal            Neurologic Exam     Mental Status   Oriented to person, place, and time  Procedures     X-rays of the lumbar spine obtained today there is normal curvature there is no compression fractures there is evidence of mild degenerative disc at L5-S1      I have personally reviewed pertinent films in PACS and my interpretation is Xrays Lumbar Spine  Outside MRI 2020  MRI LUMBAR SPINE WO CONTRAST    Anatomical Region Laterality Modality   L-spine -- Magnetic Resonance   MRSPINE -- --     Impression    Impression:   1  Mildly decreased T1 and increased T2 signal intensity in the S1 sacral   segment, a finding that could be secondary to an atypical hemangioma  Further   evaluation with a dedicated CT of the sacrum is recommended to exclude the   possibility of a marrow replacing process or fracture  2  Right-sided disc protrusions at L3-4, L4-5 and L5-S1    3  Narrowing of several neural foramina as detailed above  4  Left Bertolotti syndrome                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   • Depression    • ED (erectile dysfunction)    • History of Graves' disease     s/p Tapazole x 2 years, then radioactive iodine, which resulted in euthyroid status   • Hyperlipidemia    • Hypertension    • Lumbar spondylosis 11/16/2020   • Myofascial pain syndrome of lumbar spine 12/14/2020   • Overweight    • Vitamin D deficiency        Past Surgical History:   Procedure Laterality Date   • NO PAST SURGERIES         Social History     Socioeconomic History   • Marital status: /Civil Union     Spouse name: Not on file   • Number of children: 1   • Years of education: Not on file   • Highest education level: Not on file   Occupational History   • Occupation: Paramedic with [employer]   Tobacco Use   • Smoking status: Former     Packs/day: 1 50     Years: 19 00     Pack years: 28 50     Types: Cigarettes     Start date: 1992     Quit date: 2011     Years since quittin 3   • Smokeless tobacco: Never   Vaping Use   • Vaping Use: Never used   Substance and Sexual Activity   • Alcohol use:  Yes     Alcohol/week: 3 0 standard drinks     Types: 3 Standard drinks or equivalent per week   • Drug use: Not Currently     Types: Marijuana     Comment: Last use 22yo   • Sexual activity: Yes     Partners: Female     Birth control/protection: None   Other Topics Concern   • Not on file   Social History Narrative        Lives with wife    1 Child - 1 Daughter    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[facility] [employer]     Social Determinants of Health     Financial Resource Strain: Not on file   Food Insecurity: Not on file   Transportation Needs: Not on file   Physical Activity: Not on file   Stress: Not on file   Social Connections: Not on file   Intimate Partner Violence: Not on file   Housing Stability: Not on file       Family History   Problem Relation Age of Onset   • Hypertension Mother    • Diabetes type II Mother    • Stomach cancer Mother 48   • Depression Mother    • Hyperlipidemia Mother    • Mental illness Mother    • Depression Father    • Mental retardation Brother    • No Known Problems Daughter

## 2023-04-25 NOTE — PATIENT INSTRUCTIONS
Please obtain the images on CD for your MRI of the lumbar spine obtained in 2020  She should bring this along to your next appointment so that we can upload the images  While taking the oral steroid Medrol Dose Pack, do not take any NSAIDs such as Advil, Motrin, ibuprofen, Motrin, Aleve, naproxen, Celebrex or Meloxicam   You can restart the NSAIDs after you finish the steroids  However you may take Tylenol 500mg every 4-6 hours as needed OR max 1,000mg per dose up to 3 times per day for a total of 3,000mg per day  While taking oral steroids, you may experience mild side effects such as feeling jittery or flushing  Please call if your side effects are significant or you have any questions  While taking meloxicam do not take any other NSAIDs such as Advil, Motrin, ibuprofen, Celebrex, naproxen or Aleve  However you may take Tylenol    You may also take Tylenol 500mg every 4-6 hours as needed OR max 1,000mg per dose up to 3 times per day for a total of 3,000mg per day

## 2023-05-03 ENCOUNTER — OFFICE VISIT (OUTPATIENT)
Dept: OBGYN CLINIC | Facility: HOSPITAL | Age: 48
End: 2023-05-03

## 2023-05-03 ENCOUNTER — HOSPITAL ENCOUNTER (OUTPATIENT)
Dept: RADIOLOGY | Facility: HOSPITAL | Age: 48
Discharge: HOME | End: 2023-05-03
Attending: ORTHOPAEDIC SURGERY

## 2023-05-03 ENCOUNTER — CLINICAL SUPPORT (OUTPATIENT)
Dept: NUTRITION | Facility: HOSPITAL | Age: 48
End: 2023-05-03
Attending: FAMILY MEDICINE

## 2023-05-03 VITALS
WEIGHT: 182.1 LBS | DIASTOLIC BLOOD PRESSURE: 80 MMHG | SYSTOLIC BLOOD PRESSURE: 119 MMHG | BODY MASS INDEX: 25.49 KG/M2 | HEIGHT: 71 IN | HEART RATE: 81 BPM

## 2023-05-03 VITALS — WEIGHT: 187.6 LBS | BODY MASS INDEX: 26.54 KG/M2

## 2023-05-03 DIAGNOSIS — M51.37 DDD (DEGENERATIVE DISC DISEASE), LUMBOSACRAL: ICD-10-CM

## 2023-05-03 DIAGNOSIS — M46.1 SACROILIITIS (HCC): ICD-10-CM

## 2023-05-03 DIAGNOSIS — M51.37 DDD (DEGENERATIVE DISC DISEASE), LUMBOSACRAL: Primary | ICD-10-CM

## 2023-05-03 DIAGNOSIS — E55.9 VITAMIN D DEFICIENCY: ICD-10-CM

## 2023-05-03 DIAGNOSIS — M54.50 CHRONIC MIDLINE LOW BACK PAIN WITHOUT SCIATICA: ICD-10-CM

## 2023-05-03 DIAGNOSIS — G89.29 CHRONIC MIDLINE LOW BACK PAIN WITHOUT SCIATICA: ICD-10-CM

## 2023-05-03 DIAGNOSIS — Q76.49 BERTOLOTTI'S SYNDROME: ICD-10-CM

## 2023-05-03 DIAGNOSIS — I10 PRIMARY HYPERTENSION: ICD-10-CM

## 2023-05-03 DIAGNOSIS — E78.5 HYPERLIPIDEMIA, UNSPECIFIED HYPERLIPIDEMIA TYPE: Primary | ICD-10-CM

## 2023-05-03 DIAGNOSIS — E66.3 OVERWEIGHT: ICD-10-CM

## 2023-05-03 NOTE — PROGRESS NOTES
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[place] [id number]  He takes ibuprofen daily to deal with the pain  Occasionally will take muscle relaxers when not working  He has tried multiple treatments throughout the years including chiropractic treatment which exacerbated his pain  He has done multiple rounds of physical therapy and currently reenrolled to start new session of therapy soon  Has also tried multiple injections including epidural steroid injections and what sounds like facet/medial branch blocks with no substantial relief  He is otherwise healthy  He denies any significant radicular pain  Denies fever or chills  Denies any bladder or bowel changes            FAMILY HISTORY   Family History   Problem Relation Age of Onset    Hypertension Mother     Diabetes type II Mother     Stomach cancer Mother 48    Depression Mother     Hyperlipidemia Mother     Mental illness Mother     Depression Father     Mental retardation Brother     No Known Problems Daughter        PAST MEDICAL HISTORY:   Past Medical History:   Diagnosis Date    Anxiety     Bertolotti's syndrome 2020    Depression     ED (erectile dysfunction)     History of Graves' disease     s/p Tapazole x 2 years, then radioactive iodine, which resulted in euthyroid status    Hyperlipidemia     Hypertension     Lumbar spondylosis 2020    Myofascial pain syndrome of lumbar spine 2020    Overweight     Vitamin D deficiency        MEDICATIONS:  Current Outpatient Medications   Medication Sig Dispense Refill    Cholecalciferol (Vitamin D3) 125 MCG (5000 UT) TABS Take 5,000 Units by mouth daily      CVS FIBER GUMMIES PO Take 2 tablets by mouth in the morning      cyclobenzaprine (FLEXERIL) 10 mg tablet Take 1 tablet (10 mg total) by mouth daily as needed for muscle spasms 30 tablet 0    losartan (COZAAR) 100 MG tablet Take 1 tablet (100 mg total) by mouth daily 90 tablet 2    meloxicam (MOBIC) 15 mg tablet Take 1 tablet (15 mg total) by mouth daily 30 tablet 0    methylPREDNISolone 4 MG tablet therapy pack Use as directed on package 1 each 0    Multiple Vitamin (multivitamin) tablet Take 1 tablet by mouth daily      tadalafil (CIALIS) 2 5 MG tablet Take 1 tablet (2 5 mg total) by mouth daily 90 tablet 2    venlafaxine (EFFEXOR-XR) 37 5 mg 24 hr capsule 1 pill by mouth daily x 1 week, then increase to 2 pills by mouth daily thereafter  60 capsule 1     No current facility-administered medications for this visit  PAST SURGICAL HISTORY:  Past Surgical History:   Procedure Laterality Date    NO PAST SURGERIES         SOCIAL HISTORY:  Social History     Socioeconomic History    Marital status: /Civil Union     Spouse name: Not on file    Number of children: 1    Years of education: Not on file    Highest education level: Not on file   Occupational History    Occupation: Paramedic with [employer]   Tobacco Use    Smoking status: Former     Packs/day: 1 50     Years: 19 00     Pack years: 28 50     Types: Cigarettes     Start date: 1992     Quit date: 2011     Years since quittin 3    Smokeless tobacco: Never   Vaping Use    Vaping Use: Never used   Substance and Sexual Activity    Alcohol use:  Yes     Alcohol/week: 3 0 standard drinks     Types: 3 Standard drinks or equivalent per week    Drug use: Not Currently     Types: Marijuana     Comment: Last use 20yo    Sexual activity: Yes     Partners: Female     Birth control/protection: None   Other Topics Concern    Not on file   Social History Narrative        Lives with wife    1 Child - "1 Daughter    Paramedic with [employer]     Social Determinants of Health     Financial Resource Strain: Not on file   Food Insecurity: Not on file   Transportation Needs: Not on file   Physical Activity: Not on file   Stress: Not on file   Social Connections: Not on file   Intimate Partner Violence: Not on file   Housing Stability: Not on file       ALLERGIES:  No Known Allergies    ROS:   Constitutional:  No fever, chills, night sweats, loss of appetite   HEENT No no sore throat, difficulty swallowing   Cardiovascular:  No chest pain, palpitations, BLE edema, MCCORMICK   Respiratory:  No SOB, coughing, chest congestion   Gastrointestinal:  No nausea, vomiting, abdominal pain   Genitourinary:  No dysuria, hematuria, urinary urgency/frequency   Musculoskeletal:  See HPI   Skin:  No rash, erythema, edema   Neurologic:  See HPI   Psychiatric Illness:  No depression, anxiety, mood disorder, substance abuse disorder     PHYSICAL EXAM:  /80   Pulse 81   Ht 5' 10 5\" (1 791 m)   Wt 82 6 kg (182 lb 1 6 oz)   BMI 25 76 kg/m²           General:  Well-developed,appears well, no acute distress   Respiratory:  No SOB, no abnormal effort (use of accessory muscles)  GI / Abdominal:  Soft  No abdominal masses or tenderness  Nondistended  Gait & balance No evidence of myelopathic gait  Lumbar spine range of motion:  -Forward flexion to 90  -Extension to neutral  -Lateral bend 45 right, 45 left  -Rotation 45 right, 45 left  There is no point tenderness with palpation along the posterior cervical, thoracic, lumbar spine     Diffuse lumbar paraspinal tenderness    Neurologic:    Lower Extremity Motor Function    Right  Left    Iliopsoas  5/5  5/5    Quadriceps 5/5 5/5   Tibialis anterior  5/5  5/5    EHL  5/5  5/5    Gastroc  muscle  5/5  5/5    Heel rise  5/5  5/5    Toe rise  5/5  5/5      Sensory: light touch is intact to bilateral upper and lower extremities     Reflexes:    Right Left      " Patellar 1+ 1+   Achilles 1+ 1+   Babinski neg neg     Other tests:  Straight Leg Raise: negative  Leroy SI: positive  COMFORT SI: positive  Greater troch: no tenderness   Internal/external hip ROM: intact, no pain   Flexion/extension knee ROM: intact, no pain     IMAGING: I have personally reviewed the images and these are my findings:  Lumbar x-rays from 4/25/20/2023 and 5/3/2023: multi level lumbar spondylosis, loss of disc height L4-5 and L5-S1, mild facet arthropathy, no apparent spondylolisthesis, no obvious instability         ASSESSMENT / Medical Decision Making (MDM):  49 y o  male with history of low back pain  No incontinence of bowel/bladder, no fever, no chills, no night sweats  Physical exam showing no focal neurologic deficits  Imaging reviewed as above  Findings most notable for lumbar spondylosis    Impression of lumbar degenerative disease, sacral iliac joint dysfunction    Plan: The above clinical, physical and imaging findings were reviewed with the patient  Inge Edge  has a constellation of findings consistent with lumbar myofascial pain in the setting of lumbar degenerative disease as well as sacroiliac right greater than left pain  Fortunately Inge Edge remains neurologically intact and highly functioning, but does so with daily pain and requiring daily medication with NSAIDs and muscle relaxers  Discussed with Inge Edge the differential diagnosis, and at this time is likely multifactorial but with significant right-sided sacroiliac joint pain at today's examination  We discussed treatment options including physical therapy which she is currently enrolled in, at home exercises, medication management, chiropractor treatment and interventional spine procedures  Patient may benefit from interventional spine procedure and right sacroiliac joint injection  Referral to radiology for right-sided CT-guided sacroiliac joint injection   Discussed potential role of steroid injection at or near the source of pain to provide targeted relief  We will have the patient follow-up after therapy and injections to evaluate efficacy and determine need for advanced imaging

## 2023-05-03 NOTE — PROGRESS NOTES
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    Depression     ED (erectile dysfunction)     History of Graves' disease     s/p Tapazole x 2 years, then radioactive iodine, which resulted in euthyroid status    Hyperlipidemia     Hypertension     Lumbar spondylosis 11/16/2020    Myofascial pain syndrome of lumbar spine 12/14/2020    Overweight     Vitamin D deficiency        Current Outpatient Medications   Medication Sig Dispense Refill    Cholecalciferol (Vitamin D3) 125 MCG (5000 UT) TABS Take 5,000 Units by mouth daily      [facility] FIBER GUMMIES PO Take 2 tablets by mouth in the morning      cyclobenzaprine (FLEXERIL) 10 mg tablet Take 1 tablet (10 mg total) by mouth daily as needed for muscle spasms 30 tablet 0    losartan (COZAAR) 100 MG tablet Take 1 tablet (100 mg total) by mouth daily 90 tablet 2    meloxicam (MOBIC) 15 mg tablet Take 1 tablet (15 mg total) by mouth daily 30 tablet 0    methylPREDNISolone 4 MG tablet therapy pack Use as directed on package 1 each 0    Multiple Vitamin (multivitamin) tablet Take 1 tablet by mouth daily      tadalafil (CIALIS) 2 5 MG tablet Take 1 tablet (2 5 mg total) by mouth daily 90 tablet 2    venlafaxine (EFFEXOR-XR) 37 5 mg 24 hr capsule 1 pill by mouth daily x 1 week, then increase to 2 pills by mouth daily thereafter  60 capsule 1     No current facility-administered medications for this visit          Additional Meds/Supplements: n/a   Special Learning Needs/barriers to " "learning/any new barriers n/a   Height: HC Readings from Last 5 Encounters:   No data found for [facility]       Weight: Wt Readings from Last 10 Encounters:   23 82 6 kg (182 lb 1 6 oz)   23 82 6 kg (182 lb)   23 83 5 kg (184 lb)   23 85 2 kg (187 lb 12 8 oz)   23 87 3 kg (192 lb 6 4 oz)     Estimated body mass index is 25 76 kg/m² as calculated from the following:    Height as of an earlier encounter on 5/3/23: 5' 10 5\" (1 791 m)  Weight as of an earlier encounter on 5/3/23: 82 6 kg (182 lb 1 6 oz)  Recent Weight Change: [x]Yes     []No  Amount: 15 loss since  desired and intentional      Energy Needs: 1565 cals (25cals/kg - 500cals for 1#/wk wt loss)   No Known Allergies or intolerances NKFA   Social History     Substance and Sexual Activity   Alcohol Use Yes    Alcohol/week: 3 0 standard drinks    Types: 3 Standard drinks or equivalent per week    3 x /wk 2-3 glasses wine    Social History     Tobacco Use   Smoking Status Former    Packs/day: 1 50    Years: 19 00    Pack years: 28 50    Types: Cigarettes    Start date: 1992   [facility] Quit date: 2011    Years since quittin 3   Smokeless Tobacco Never    n/a   Who shops? 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 []Yes     [x]No  When:      Why:         Diet Hx: 2 bottles of water with crystal light- sweet tea mini coke or pepsi (100cals)  Breakfast: Diet:  430-5  (when working)-  [employer] with fruit yogurt with berries    Lunch: 1130-12 (when working)  [employer] and cheese quesadilla 16oz sugary drink- playa bowl granola PB fruit and acai         Dinner: 630-7  BBQ glazed pork cho with frozen vegetables x1c and mini coke small glass of blackberry wine- double quarter pounder fries medium size sprite         Snacks: AM - granola bar fruit and nut  PM -   HS - 3/4c vega cookie dough   Other Notes/ Initial Assessment:  Zion June is here to lose weight and improve his body composition, he works 2 jobs one [employer] and one part time averaging about 48 hours a week  He does have some depression  He is originally form [place] moved up here with his wife about 4 years ago  He has had trouble sleeping for years  He is aware his food choices are not always great  Updated assessment (Follow up note only):           Nutrition Diagnosis:   Altered Nutrition-Related Laboratory values  related to Kidney, liver, cardiac, endocrine, neurologic, and/or pulmonary dysfunction as evidenced by  Abnormal serum lipids       Any change or new dx since previous visit:     Nutrition Diagnosis:   Overweight/obesity  related to Excess energy intake as evidenced by  BMI more than normative standard for age and sex (overweight 25-29  9)      Medical Nutrition Therapy Intervention:  []Individualized Meal Plan-Discussed the importance of eating 3 meals daily and not skipping, and how metabolism is affected    Also discussed adding in small snacks or 5-6 small meals daily if desired vs 3 larger ones, and appropriate options and portions  Appropriate timing of meals, including eating within 1 hr of waking and eating meals slowly 20mins/meals, minimizing mindless/boredom or habitual eating, etc was also mentioned  Discussed the plate method of portioning foods, including half a plate fruits and vegetables or a half plate all vegetables, 1/4 of the plate a lean protein source or meat, and a 1/4 of the plate being a whole grain carb- usually 1/2-1c  This should be followed for at least 2 meals of the day, but could also be followed for all 3  Advised to minimize/eliminate drinks with calories, and drinks with artificial sweetners added and their affect on wt and the human body  []Understanding Lab Values   []Basic Pathophysiology of Disease []Food/Medication Interactions   []Food Diary [x]Exercise-150 mins of moderate activity weekly, discussed importance of variety of activity, including wt bearing activities 2-3x/wk x 10-15mins  Discussed importance of activity throughout the lifecycle and its impact on overall health/stress management, etc      [x]Lifestyle/Behavior Modification Techniques-Discussed the importance of adequate sleep, and ideal sleeping conditions, including a cool temperature 64-68 degrees F, and a dark and quiet room  Also discussed the importance of a regular and consistent sleep routine, including minimizing blue light exposure an hour before sleeping  Weekend habits should include staying fairly consistent with weekday sleep habits to minimize disruption during the week  A connection was made between getting adequate and good quality sleep and the ability to handle stress the next day, make healthy food choices, and be active   []Medication, Mechanism of Action   []Label Reading: CHO/ Na/ Fat/ other_________ []Self Blood Glucose Monitoring   [x]Weight/BMI Goals: gain/lose/maintain-Short term goal of 2-4# x 1 month or next visit- long term goal 170's body fat 22% []Other -           Comprehension: []Excellent  []Very Good  [x]Good  []Fair   []Poor    Receptivity: []Excellent  []Very Good  [x]Good  []Fair   []Poor    Expected Compliance: []Excellent  []Very Good  [x]Good  []Fair   []Poor        Goals (initial)/ Progress made on previous goals/new goals:  1   1600-1800cals daily   2  Portions per plate method as discussed with RD   3  Food diary daily until next visit       No follow-ups on file    Labs:  CMP  Lab Results   Component Value Date    K 4 4 03/13/2023     03/13/2023    CO2 27 03/13/2023    BUN 15 03/13/2023    CREATININE 1 23 03/13/2023    GLUF 123 (H) 03/13/2023    CALCIUM 9 8 03/13/2023    AST 14 03/13/2023    ALT 23 03/13/2023    ALKPHOS 73 03/13/2023    EGFR 68 03/13/2023       BMP  Lab Results   Component Value Date    CALCIUM 9 8 03/13/2023    K 4 4 03/13/2023    CO2 27 03/13/2023     03/13/2023    BUN 15 03/13/2023    CREATININE 1 23 03/13/2023       Lipids  No results found for: CHOL  Lab Results   Component Value Date    HDL 37 (L) 02/09/2023     Lab Results   Component Value Date    LDLCALC 87 02/09/2023     Lab Results   Component Value Date    TRIG 207 (H) 02/09/2023     No results found for: CHOLHDL    Hemoglobin A1C  Lab Results   Component Value Date    HGBA1C 5 5 02/09/2023       Fasting Glucose  Lab Results   Component Value Date    GLUF 123 (H) 03/13/2023       Insulin     Thyroid  No results found for: TSH, M8QNMTR, V2ZQTNU, THYROIDAB    Hepatic Function Panel  Lab Results   Component Value Date    ALT 23 03/13/2023    AST 14 03/13/2023    ALKPHOS 73 03/13/2023       Celiac Disease Antibody Panel  No results found for: ENDOMYSIAL IGA, GLIADIN IGA, GLIADIN IGG, IGA, TISSUE TRANSGLUT AB, TTG IGA   Iron  No results found for: IRON, TIBC, FERRITIN         Daphne Yepez [address] [place] [address] [address] [id number]

## 2023-05-06 DIAGNOSIS — F32.0 CURRENT MILD EPISODE OF MAJOR DEPRESSIVE DISORDER, UNSPECIFIED WHETHER RECURRENT (HCC): ICD-10-CM

## 2023-05-06 DIAGNOSIS — F41.9 ANXIETY: ICD-10-CM

## 2023-05-06 RX ORDER — VENLAFAXINE HYDROCHLORIDE 37.5 MG/1
CAPSULE, EXTENDED RELEASE ORAL
Qty: 159 CAPSULE | Refills: 1 | Status: SHIPPED | OUTPATIENT
Start: 2023-05-06

## 2023-05-08 ENCOUNTER — EVALUATION (OUTPATIENT)
Dept: PHYSICAL THERAPY | Facility: CLINIC | Age: 48
End: 2023-05-08

## 2023-05-08 DIAGNOSIS — G89.29 CHRONIC MIDLINE LOW BACK PAIN WITHOUT SCIATICA: Primary | ICD-10-CM

## 2023-05-08 DIAGNOSIS — Q76.49 BERTOLOTTI'S SYNDROME: ICD-10-CM

## 2023-05-08 DIAGNOSIS — M51.37 DDD (DEGENERATIVE DISC DISEASE), LUMBOSACRAL: ICD-10-CM

## 2023-05-08 DIAGNOSIS — M54.50 CHRONIC MIDLINE LOW BACK PAIN WITHOUT SCIATICA: Primary | ICD-10-CM

## 2023-05-08 NOTE — PROGRESS NOTES
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  Does report difficulty with sleep  Has been on 2 5 mg of daily Cialis for erection issues but reports lack of a sex drive for desire for intercourse  Testosterone checked by his primary care team with abnormal total testosterone in February and abnormal free testosterone in March       3/13/23  7:21 AM 2/9/23  7:21 AM    Testosterone, Free 6 8 - 21 5 pg/mL 6 5 Low   7 1    TESTOSTERONE TOTAL 264 - 916 ng/dL 270  261 Low  CM          Urinary Score(s)                Past Medical History:     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   • Depression    • ED (erectile dysfunction)    • History of Graves' disease     s/p Tapazole x 2 years, then radioactive iodine, which resulted in euthyroid status   • Hyperlipidemia    • Hypertension    • Lumbar spondylosis 11/16/2020   • Myofascial pain syndrome of lumbar spine 12/14/2020   • Overweight    • Vitamin D deficiency        PAST SURGICAL HISTORY:     Past Surgical History:   Procedure Laterality Date   • NO PAST SURGERIES         CURRENT MEDICATIONS:     Current Outpatient Medications   Medication Sig Dispense Refill   • Cholecalciferol (Vitamin D3) 125 MCG (5000 UT) TABS Take 5,000 Units by mouth daily     • [facility] FIBER GUMMIES PO Take 2 tablets by mouth in the morning     • cyclobenzaprine (FLEXERIL) 10 mg tablet Take 1 tablet (10 mg total) by mouth daily as needed for muscle spasms 30 tablet 0   • losartan (COZAAR) 100 MG tablet Take 1 tablet (100 mg total) by mouth daily 90 tablet 2   • meloxicam (MOBIC) 15 mg tablet Take 1 tablet (15 mg total) by mouth daily 30 tablet 0   • methylPREDNISolone 4 MG tablet therapy pack Use as directed on package 1 each 0   • Multiple Vitamin (multivitamin) tablet Take 1 tablet by mouth daily     • tadalafil (CIALIS) 2 5 MG tablet Take 1 tablet (2 5 mg total) by mouth daily 90 tablet 2   • venlafaxine (EFFEXOR-XR) 37 5 mg 24 hr capsule TAKE 1 CAPSULE BY MOUTH DAILY FOR 1 WEEK, THEN INCREASE TO 2 CAPS DAILY #FILL AT [facility]# 159 capsule 1     No current facility-administered medications for this visit  ALLERGIES:   No Known Allergies    SOCIAL HISTORY:     Social History     Socioeconomic History   • Marital status: /Civil Union     Spouse name: None   • Number of children: 1   • Years of education: None   • Highest education level: None   Occupational History   • Occupation: Paramedic with [employer]   Tobacco Use   • Smoking status: Former     Packs/day: 1 50     Years: 19 00     Pack years: 28 50     Types: Cigarettes     Start date: 1992     Quit date: 2011     Years since quittin 3   • Smokeless tobacco: Never   Vaping Use   • Vaping Use: Never used   Substance and Sexual Activity   • Alcohol use:  Yes     Alcohol/week: 3 0 standard drinks     Types: 3 Standard drinks or equivalent per week     Comment: occasionally, socially   • Drug use: Not Currently     Types: Marijuana     Comment: Last use 20yo   • Sexual activity: Yes     Partners: Female     Birth control/protection: None   Other Topics Concern   • None   Social History Narrative        Lives with wife    1 Child - 1 Daughter    Paramedic with [employer]     Social Determinants of Health     Financial Resource Strain: Not on file   Food Insecurity: Not "on file   Transportation Needs: Not on file   Physical Activity: Not on file   Stress: Not on file   Social Connections: Not on file   Intimate Partner Violence: Not on file   Housing Stability: Not on file       SOCIAL HISTORY:     Family History   Problem Relation Age of Onset   • Hypertension Mother    • Diabetes type II Mother    • Stomach cancer Mother 48   • Depression Mother    • Hyperlipidemia Mother    • Mental illness Mother    • Depression Father    • Mental retardation Brother    • No Known Problems Daughter        REVIEW OF SYSTEMS:     Review of Systems   Constitutional: Positive for activity change and fatigue  Respiratory: Negative  Cardiovascular: Negative  Gastrointestinal: Negative  Genitourinary: Negative  Musculoskeletal: Positive for back pain  Skin: Negative  Psychiatric/Behavioral: Positive for decreased concentration and sleep disturbance  PHYSICAL EXAM:     /86 (BP Location: Right arm, Patient Position: Sitting, Cuff Size: Adult)   Pulse 91   Ht 5' 10 5\" (1 791 m)   Wt 84 2 kg (185 lb 9 6 oz)   SpO2 95%   BMI 26 25 kg/m²     Physical Exam  Vitals reviewed  HENT:      Head: Normocephalic  Nose: Nose normal       Mouth/Throat:      Mouth: Mucous membranes are moist    Eyes:      Pupils: Pupils are equal, round, and reactive to light  Cardiovascular:      Rate and Rhythm: Normal rate  Pulmonary:      Effort: Pulmonary effort is normal    Abdominal:      General: Abdomen is flat  Comments: Non-obese   Genitourinary:     Penis: Normal        Testes: Normal       Prostate: Normal       Comments: Testicles are normal size without atrophy, prostate is slightly enlarged but smooth without nodules  Musculoskeletal:         General: Normal range of motion  Cervical back: Normal range of motion  Skin:     General: Skin is warm  Neurological:      General: No focal deficit present  Mental Status: He is alert     Psychiatric:         Mood " and Affect: Mood normal       Comments: Depressed affect         LABS:     CBC:   Lab Results   Component Value Date    WBC 6 32 02/09/2023    HGB 16 6 02/09/2023    HCT 52 2 (H) 02/09/2023     (H) 02/09/2023     02/09/2023       BMP:   Lab Results   Component Value Date    CALCIUM 9 8 03/13/2023    K 4 4 03/13/2023    CO2 27 03/13/2023     03/13/2023    BUN 15 03/13/2023    CREATININE 1 23 03/13/2023     ASSESSMENT:     49 y o  male  with low testosterone     PLAN:     I think the patient certainly exhibits multiple signs of low testosterone not limited to sexual dysfunction  I would recommend a broader hormonal panel with FSH and LH, sex hormone binding globulin and prolactin  Assuming there is no more central concern for endocrinopathy, could consider a trial of Clomid to assess for improvement in symptoms and testosterone values  If this fails, formal testosterone replacement could be trialed  Patient understands that there are risks of prolonged testosterone therapy however likewise there are significant risks and morbidity for symptomatic hypogonadism  Prostate exam was performed today and although mildly enlarged was not normal   PSA will be checked in 2 weeks along with his hormonal panel

## 2023-05-08 NOTE — PROGRESS NOTES
PT Evaluation     Today's date: 2023  Patient name: Kallie Bates  : 1975  MRN: [id number]  Referring provider: Montrell Baeza MD  Dx:   Encounter Diagnosis     ICD-10-CM    1  Chronic midline low back pain without sciatica  M54 50 Ambulatory Referral to Physical Therapy    G89 29       2  Bertolotti's syndrome  Q76 49 Ambulatory Referral to Physical Therapy      3  DDD (degenerative disc disease), lumbosacral  M51 37 Ambulatory Referral to Physical Therapy          Start Time: 820  Stop Time:   Total time in clinic (min): 45 minutes    Assessment  Assessment details: Patient is a 49year old male that presents to outpatient physical therapy with chronic low back pain without sciatica  Pt demonstrates signs and symptoms indicate decreased movement coordination  Pt also exhibits limited lumbopelvic mobility and decreased functional core strength  Pt also demonstrated (+) distraction, thigh thrust, compression, and sacral thrust test indicating possible SIJ involvement  Pt also reported pain with mobility testing with lumbar testing as well as decrease lumbar ROM  Pt demonstrates increased pain, decreased ROM, decreased strength, decreased activity tolerance, and decreased functional activity such as bending, lifting, ADLs and work duties due to pain  Pt appears motivated; HEP was given to patient  May benefit from additional PNE due to chronicity of symptoms  Pt would benefit from skilled physical therapy to address noted impairments, meet patient's goals, and to return to PLOF  Thank you for this referral     Impairments: abnormal muscle tone, abnormal or restricted ROM, activity intolerance, impaired physical strength, lacks appropriate home exercise program, pain with function, poor posture  and poor body mechanics    Symptom irritability: lowUnderstanding of Dx/Px/POC: good   Prognosis: good    Goals  STGs:   1   Pt will be able to demonstrate an increase of strength by at least 1/2 grade within 4 weeks   2  Pt will be able to achieve increased ROM by at least 25% within 4 weeks  3  Pt will be able to report pain less than 6/10 at worse within 4 weeks  4  Pt will be able to demonstrate improved TA activation without reports of symptoms without cueing within 4 weeks  LTGs:   1  Pt will be independent with all IADLs without pain or discomfort upon discharge  2  Pt will be independent with HEP upon discharge   3  Pt will be able to report no pain or discomfort with all work duties and recreational activities for a full day upon discharge  4  Pt will be able to report 'no difficulty' with heavy household activities such as cleaning or lifting at least 10 lbs to waist upon discharge       Plan  Plan details: Visits: 2x a week   Duration: 8 weeks   Patient would benefit from: PT eval and skilled physical therapy  Planned modality interventions: cryotherapy, electrical stimulation/Russian stimulation, iontophoresis, low level laser therapy, TENS, thermotherapy: hydrocollator packs, ultrasound, traction and unattended electrical stimulation  Planned therapy interventions: abdominal trunk stabilization, IADL retraining, joint mobilization, manual therapy, massage, muscle pump exercises, neuromuscular re-education, patient education, postural training, strengthening, stretching, therapeutic activities, therapeutic training, therapeutic exercise, home exercise program, functional ROM exercises, gait training, balance/weight bearing training, balance, ADL training, activity modification, flexibility, Ramírez taping and breathing training  Treatment plan discussed with: patient        Subjective Evaluation    History of Present Illness  Mechanism of injury: Pt states that he has been have pain/discomfort for the past 8 years ago  Reports that his pain chronic daily and effects his every day activities  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[employer]   He has tried multiple treatments throughout the years including chiropractic treatment which exacerbated his pain  He has done multiple rounds of physical therapy and currently reenrolled to start new session of therapy soon  Has also tried multiple injections including epidural steroid injections and what sounds like facet/medial branch blocks with no substantial relief  He denies any significant radicular pain  Denies fever or chills  Denies any bladder or bowel changes  AGGS: bending, lifting, work duties, ADLs  EASES: rest, medication   Goals: decrease pain, running and jogging, working out       Imaging findings: Xray - Mild intervertebral disc base narrowing at L4-5 and L5-S1  Pain  Current pain ratin  At best pain ratin  At worst pain rating: 10  Quality: dull ache, sharp, throbbing, discomfort and cramping    Treatments  Previous treatment: physical therapy, chiropractic, medication and injection treatment  Patient Goals  Patient goals for therapy: decreased pain, return to sport/leisure activities, independence with ADLs/IADLs and increased motion          Objective     Tenderness     Lumbar Spine  Tenderness in the spinous process, left transverse process and right transverse process       Neurological Testing     Sensation     Lumbar   Left   Intact: light touch    Right   Intact: light touch    Active Range of Motion     Lumbar   Flexion:  with pain Restriction level: moderate  Extension:  with pain Restriction level: moderate  Left lateral flexion:  with pain Restriction level: minimal  Right lateral flexion:  with pain Restriction level: minimal  Left rotation:  with pain Restriction level: minimal  Right rotation:  with pain Restriction level: minimal    Passive Range of Motion   Left Hip   Normal passive range of motion  External rotation (90/90): with pain  Internal rotation (90/90): with pain    Right Hip   Normal passive range of motion  External rotation (90/90): with pain  Internal rotation (90/90): with pain    Joint Play     Pain: L2, L3, L4, L5 and S1     Strength/Myotome Testing     Left Hip   Planes of Motion   Flexion: 4+  Extension: 4+  Abduction: 4+    Right Hip   Planes of Motion   Flexion: 4+  Extension: 4+  Abduction: 4+    Left Knee   Flexion: 4+  Extension: 4+    Right Knee   Flexion: 4+  Extension: 4+    Left Ankle/Foot   Dorsiflexion: 5  Plantar flexion: 5    Right Ankle/Foot   Dorsiflexion: 5  Plantar flexion: 5    Additional Strength Details  Pt reported low back pain with MMT to (B) hip flexion, extension, knee flexion, and knee extension     Muscle Activation   Patient able to activate left transverse abdominals and right transverse abdominals  Tests   Cervical   Positive vertical compression  Lumbar   Positive prone instability , vertical compression , SIJ compression, sacroiliac distraction , Gaenslen's, sacral thrust and sacral spring   Left   Positive crossed SLR and quadrant  Negative femoral stretch, passive SLR and slump test      Right   Positive crossed SLR and quadrant  Negative femoral stretch, passive SLR and slump test      Left Pelvic Girdle/Sacrum   Positive: thigh thrust      Right Pelvic Girdle/Sacrum   Positive: thigh thrust      Left Hip   Positive COMFORT, FADIR and scour  Negative Ely's and femoral nerve tension  Modified Andrew: Positive  Right Hip   Positive COMFORT, FADIR and scour  Negative Ely's and femoral nerve tension  Modified Andrew: Positive       Additional Tests Details  (+) [place] sign with sit to stand    No noted gait abnormalities              Precautions: HTN, anxiety,     Daily Treatment Diary    Date 5/8            FOTO IE -             Re-Eval IE               Manuals    Lumbar mobs             Paraspinal TPR             Side-lying lumbar mob             Hip PROM                                       Neuro Re-Ed     PPT                                                                                           Ther Ex    Bike              Hamstring stretch "  Piriformis stretch              LTRs             Bridges with PPT PPT only 10x             Supine HIP ISO alternating  3\" hold 10x ea direction             Clamshell             Standing hip abd/ext             Child's pose              Cat/camel              Bird dog             Standing Pallof Press                                                    Ther Activity                              Gait Training                              Modalities    Heat vs Ice PRN                                 "

## 2023-05-09 ENCOUNTER — APPOINTMENT (OUTPATIENT)
Dept: PHYSICAL THERAPY | Facility: CLINIC | Age: 48
End: 2023-05-09
Payer: COMMERCIAL

## 2023-05-09 ENCOUNTER — OFFICE VISIT (OUTPATIENT)
Dept: UROLOGY | Facility: CLINIC | Age: 48
End: 2023-05-09

## 2023-05-09 ENCOUNTER — APPOINTMENT (OUTPATIENT)
Dept: PHYSICAL THERAPY | Facility: MEDICAL CENTER | Age: 48
End: 2023-05-09

## 2023-05-09 VITALS
BODY MASS INDEX: 25.98 KG/M2 | HEART RATE: 91 BPM | HEIGHT: 71 IN | WEIGHT: 185.6 LBS | DIASTOLIC BLOOD PRESSURE: 86 MMHG | OXYGEN SATURATION: 95 % | SYSTOLIC BLOOD PRESSURE: 124 MMHG

## 2023-05-09 DIAGNOSIS — R79.89 LOW TESTOSTERONE IN MALE: Primary | ICD-10-CM

## 2023-05-15 DIAGNOSIS — F32.0 CURRENT MILD EPISODE OF MAJOR DEPRESSIVE DISORDER, UNSPECIFIED WHETHER RECURRENT (HCC): ICD-10-CM

## 2023-05-15 DIAGNOSIS — I10 PRIMARY HYPERTENSION: ICD-10-CM

## 2023-05-15 DIAGNOSIS — F41.9 ANXIETY: ICD-10-CM

## 2023-05-15 DIAGNOSIS — N52.8 OTHER MALE ERECTILE DYSFUNCTION: ICD-10-CM

## 2023-05-16 RX ORDER — VENLAFAXINE HYDROCHLORIDE 37.5 MG/1
CAPSULE, EXTENDED RELEASE ORAL
Qty: 159 CAPSULE | Refills: 1 | Status: SHIPPED | OUTPATIENT
Start: 2023-05-16 | End: 2023-05-22

## 2023-05-16 RX ORDER — LOSARTAN POTASSIUM 100 MG/1
100 TABLET ORAL DAILY
Qty: 90 TABLET | Refills: 2 | Status: SHIPPED | OUTPATIENT
Start: 2023-05-16

## 2023-05-16 RX ORDER — TADALAFIL 2.5 MG/1
2.5 TABLET ORAL DAILY
Qty: 90 TABLET | Refills: 2 | Status: SHIPPED | OUTPATIENT
Start: 2023-05-16

## 2023-05-17 ENCOUNTER — TELEPHONE (OUTPATIENT)
Dept: FAMILY MEDICINE CLINIC | Facility: CLINIC | Age: 48
End: 2023-05-17

## 2023-05-17 ENCOUNTER — OFFICE VISIT (OUTPATIENT)
Dept: PHYSICAL THERAPY | Facility: CLINIC | Age: 48
End: 2023-05-17

## 2023-05-17 DIAGNOSIS — M51.37 DDD (DEGENERATIVE DISC DISEASE), LUMBOSACRAL: ICD-10-CM

## 2023-05-17 DIAGNOSIS — G89.29 CHRONIC MIDLINE LOW BACK PAIN WITHOUT SCIATICA: Primary | ICD-10-CM

## 2023-05-17 DIAGNOSIS — M54.50 CHRONIC MIDLINE LOW BACK PAIN WITHOUT SCIATICA: Primary | ICD-10-CM

## 2023-05-17 DIAGNOSIS — Q76.49 BERTOLOTTI'S SYNDROME: ICD-10-CM

## 2023-05-17 NOTE — PROGRESS NOTES
"Daily Note     Today's date: 2023  Patient name: Araceli Smoker  : 1975  MRN: [id number]  Referring provider: Claude Dark, MD  Dx:   Encounter Diagnosis     ICD-10-CM    1  Chronic midline low back pain without sciatica  M54 50     G89 29       2  Bertolotti's syndrome  Q76 49       3  DDD (degenerative disc disease), lumbosacral  M51 37                      Subjective: Pt states that he is achying today  Reports that he was able to perform to the HEP daily  Denies any changes since the initial evaluation  Objective: See treatment diary below      Assessment: Tolerated treatment well  Manual techniques were performed to improve lumbopelvic mobility and to increase sacroiliac joint mobility  Tolerated activities to improve core strength and stability  Reported pain to lower spine at L5/S1 region with light static and dynamic activiites  Additional activities were added to HEP  Education was given on DOMS following increase of activity  Patient demonstrated fatigue post treatment and would benefit from continued PT      Plan: Continue per plan of care        Precautions: HTN, anxiety,     Daily Treatment Diary    Date            FOTO IE -             Re-Eval IE               Manuals    Lumbar mobs  JM - grade 1           Paraspinal TPR  JM           Side-lying lumbar mob             Hip PROM  JM           Supine sacral mob  JM - grade 2                         Neuro Re-Ed                                                                                                Ther Ex    Bike   3 min            Hamstring stretch             Piriformis stretch              LTRs             Bridges with PPT PPT only 10x  2x10            Supine HIP ISO alternating  3\" hold 10x ea direction  3\" hold 2x ea direciton            Supine core ISO w/ TBall  2x15            Clamshell  2x15 OTB           Standing hip abd/ext  NV           Child's pose   5\"x5           Cat/camel   10x            Bird dog           " Standing Pallof Press  On blue TBall 15x ea double blue TB           Side stepping w/ TB  Double Green 8x ea direction           Mini squat  On wall w/ Tball 2x10                         Ther Activity                              Gait Training                              Modalities    Heat vs Ice PRN

## 2023-05-18 ENCOUNTER — LAB (OUTPATIENT)
Dept: LAB | Facility: MEDICAL CENTER | Age: 48
End: 2023-05-18

## 2023-05-18 ENCOUNTER — CONSULT (OUTPATIENT)
Dept: PAIN MEDICINE | Facility: MEDICAL CENTER | Age: 48
End: 2023-05-18

## 2023-05-18 VITALS
DIASTOLIC BLOOD PRESSURE: 89 MMHG | HEART RATE: 62 BPM | SYSTOLIC BLOOD PRESSURE: 154 MMHG | WEIGHT: 189 LBS | BODY MASS INDEX: 26.46 KG/M2 | HEIGHT: 71 IN

## 2023-05-18 DIAGNOSIS — M47.816 LUMBAR SPONDYLOSIS: ICD-10-CM

## 2023-05-18 DIAGNOSIS — F41.9 ANXIETY: ICD-10-CM

## 2023-05-18 DIAGNOSIS — M54.50 CHRONIC MIDLINE LOW BACK PAIN WITHOUT SCIATICA: ICD-10-CM

## 2023-05-18 DIAGNOSIS — M79.18 MYOFASCIAL PAIN SYNDROME OF LUMBAR SPINE: ICD-10-CM

## 2023-05-18 DIAGNOSIS — G89.29 CHRONIC MIDLINE LOW BACK PAIN WITHOUT SCIATICA: ICD-10-CM

## 2023-05-18 DIAGNOSIS — F32.0 CURRENT MILD EPISODE OF MAJOR DEPRESSIVE DISORDER, UNSPECIFIED WHETHER RECURRENT (HCC): ICD-10-CM

## 2023-05-18 DIAGNOSIS — Q76.49 BERTOLOTTI'S SYNDROME: ICD-10-CM

## 2023-05-18 DIAGNOSIS — M53.3 COCCYDYNIA: Primary | ICD-10-CM

## 2023-05-18 LAB
ALBUMIN SERPL BCP-MCNC: 4.1 G/DL (ref 3.5–5)
ALP SERPL-CCNC: 66 U/L (ref 46–116)
ALT SERPL W P-5'-P-CCNC: 29 U/L (ref 12–78)
ANION GAP SERPL CALCULATED.3IONS-SCNC: -1 MMOL/L (ref 4–13)
AST SERPL W P-5'-P-CCNC: 16 U/L (ref 5–45)
BILIRUB SERPL-MCNC: 0.82 MG/DL (ref 0.2–1)
BUN SERPL-MCNC: 19 MG/DL (ref 5–25)
CALCIUM SERPL-MCNC: 9.6 MG/DL (ref 8.3–10.1)
CHLORIDE SERPL-SCNC: 107 MMOL/L (ref 96–108)
CO2 SERPL-SCNC: 28 MMOL/L (ref 21–32)
CREAT SERPL-MCNC: 1.07 MG/DL (ref 0.6–1.3)
GFR SERPL CREATININE-BSD FRML MDRD: 81 ML/MIN/1.73SQ M
GLUCOSE P FAST SERPL-MCNC: 100 MG/DL (ref 65–99)
POTASSIUM SERPL-SCNC: 4.7 MMOL/L (ref 3.5–5.3)
PROT SERPL-MCNC: 7.4 G/DL (ref 6.4–8.4)
SODIUM SERPL-SCNC: 134 MMOL/L (ref 135–147)

## 2023-05-18 RX ORDER — DULOXETIN HYDROCHLORIDE 60 MG/1
CAPSULE, DELAYED RELEASE ORAL
COMMUNITY
Start: 2023-05-15 | End: 2023-05-22

## 2023-05-18 NOTE — TELEPHONE ENCOUNTER
Nurse to call patient for clarification of question / concern  He should not be mixing Prednisone with NSAIDs, as well as multiple to NSAIDs due to possible GI upset / bleed  There is nothing he needs to do otherwise except avoid taking this combo of meds

## 2023-05-18 NOTE — PROGRESS NOTES
Assessment  1  Coccydynia    2  Bertolotti's syndrome    3  Lumbar spondylosis    4  Myofascial pain syndrome of lumbar spine    5  Chronic midline low back pain without sciatica        Plan  1  At this time we will schedule the patient for a sacrococcygeal joint injection  Complete risks and benefits including bleeding, infection, tissue reaction, allergic reaction were discussed  Verbal consent obtained  2  He also has a CT-guided right sacroiliac joint injection scheduled  My impressions and treatment recommendations were discussed in detail with the patient who verbalized understanding and had no further questions  Discharge instructions were provided  I personally saw and examined the patient and I agree with the above discussed plan of care  Orders Placed This Encounter   Procedures   • FL spine and pain procedure     Standing Status:   Future     Standing Expiration Date:   5/18/2027     Order Specific Question:   Reason for Exam:     Answer:   sacrococcygeal joint injection     Order Specific Question:   Anticoagulant hold needed? Answer:   no     New Medications Ordered This Visit   Medications   • DULoxetine (CYMBALTA) 60 mg delayed release capsule       History of Present Illness    Radha Juarez is a 49 y o  male seen in consultation at the request of Dr Dania Banda regarding chronic lower back pain  The patient has been experiencing low back pain and muscle spasms in his lumbar spine since an injury that occurred at work March 2015  He is currently describing moderate to severe intensity symptoms with pain rated as an 8/10 that is constant without any typical pattern described as sharp dull aching and spasms  He denies any lower extremity weakness and does not require an assistive device for ambulation  He is localizing the focal area of pain over his coccyx  He does have some pain referring out towards the sacroiliac joints as well  No radicular features      Aggravating factors include bending and exercise  Alleviating factors are unknown  Diagnostic studies include x-rays and there are MRI images from 2020  He has tried a number of different treatments including traction nerve blocks radiofrequency ablation physical therapy exercise osteopathic manipulation psychotherapy hypnosis TENS unit and chiropractic manipulation all without relief  He does get some moderate relief from local heat or ice application  Social history negative for tobacco marijuana, positive for occasional alcohol consumption  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[employer]  I have personally reviewed and/or updated the patient's past medical history, past surgical history, family history, social history, current medications, allergies, and vital signs today  Review of Systems   Constitutional: Negative for fever and unexpected weight change  HENT: Negative for trouble swallowing  Eyes: Negative for visual disturbance  Respiratory: Negative for shortness of breath and wheezing  Cardiovascular: Negative for chest pain and palpitations  Gastrointestinal: Negative for constipation, diarrhea, nausea and vomiting  Endocrine: Negative for cold intolerance, heat intolerance and polydipsia  Genitourinary: Negative for difficulty urinating and frequency  Musculoskeletal: Positive for back pain and joint swelling  Negative for arthralgias, gait problem and myalgias  Skin: Negative for rash  Neurological: Negative for dizziness, seizures, syncope, weakness and headaches  Hematological: Does not bruise/bleed easily  Psychiatric/Behavioral: Positive for dysphoric mood  All other systems reviewed and are negative        Patient Active Problem List   Diagnosis   • Anxiety   • Depression   • Hypertension   • Hyperlipidemia   • Overweight   • ED (erectile dysfunction)   • Vitamin D deficiency   • Bertolotti's syndrome   • Lumbar spondylosis   • Myofascial pain syndrome of lumbar spine   • Back pain       Past Medical History:   Diagnosis Date   • Anxiety    • Bertolotti's syndrome 2020   • Depression    • ED (erectile dysfunction)    • History of Graves' disease     s/p Tapazole x 2 years, then radioactive iodine, which resulted in euthyroid status   • Hyperlipidemia    • Hypertension    • Lumbar spondylosis 2020   • Myofascial pain syndrome of lumbar spine 2020   • Overweight    • Vitamin D deficiency        Past Surgical History:   Procedure Laterality Date   • NO PAST SURGERIES         Family History   Problem Relation Age of Onset   • Hypertension Mother    • Diabetes type II Mother    • Stomach cancer Mother 48   • Depression Mother    • Hyperlipidemia Mother    • Mental illness Mother    • Depression Father    • Mental retardation Brother    • No Known Problems Daughter        Social History     Occupational History   • Occupation: Paramedic with [employer]   Tobacco Use   • Smoking status: Former     Packs/day: 1 50     Years: 19 00     Pack years: 28 50     Types: Cigarettes     Start date: 1992     Quit date: 2011     Years since quittin 3   • Smokeless tobacco: Never   Vaping Use   • Vaping Use: Never used   Substance and Sexual Activity   • Alcohol use:  Yes     Alcohol/week: 3 0 standard drinks     Types: 3 Standard drinks or equivalent per week     Comment: occasionally, socially   • Drug use: Not Currently     Types: Marijuana     Comment: Last use 22yo   • Sexual activity: Yes     Partners: Female     Birth control/protection: None       Current Outpatient Medications on File Prior to Visit   Medication Sig   • Cholecalciferol (Vitamin D3) 125 MCG (5000 UT) TABS Take 5,000 Units by mouth daily   • [employer] FIBER GUMMIES PO Take 2 tablets by mouth in the morning   • cyclobenzaprine (FLEXERIL) 10 mg tablet Take 1 tablet (10 mg total) by mouth daily as needed for muscle spasms   • DULoxetine (CYMBALTA) 60 mg delayed release capsule    • "losartan (COZAAR) 100 MG tablet Take 1 tablet (100 mg total) by mouth daily   • meloxicam (MOBIC) 15 mg tablet Take 1 tablet (15 mg total) by mouth daily   • Multiple Vitamin (multivitamin) tablet Take 1 tablet by mouth daily   • tadalafil (CIALIS) 2 5 MG tablet Take 1 tablet (2 5 mg total) by mouth daily   • venlafaxine (EFFEXOR-XR) 37 5 mg 24 hr capsule TAKE 1 CAPSULE BY MOUTH DAILY FOR 1 WEEK, THEN INCREASE TO 2 CAPS DAILY #FILL AT [facility]#   • methylPREDNISolone 4 MG tablet therapy pack Use as directed on package     No current facility-administered medications on file prior to visit  No Known Allergies    Physical Exam    /89   Pulse 62   Ht 5' 10 5\" (1 791 m)   Wt 85 7 kg (189 lb)   BMI 26 74 kg/m²     Constitutional: normal, well developed, well nourished, alert, in no distress and non-toxic and no overt pain behavior  Eyes: anicteric  HEENT: grossly intact  Neck:  symmetric, trachea midline and no masses   Pulmonary:even and unlabored  Cardiovascular:No edema or pitting edema present  Skin:Normal without rashes or lesions and well hydrated  Psychiatric:Mood and affect appropriate  Neurologic:Cranial Nerves II-XII grossly intact, bilateral lower extremity muscle stretch reflexes are physiologic and symmetric at the knees and ankles, bilateral lower extremity strength is normal  Musculoskeletal:normal, except for significant tenderness to palpation reproducing his pain complaint over the coccyx, he does have some pain with lumbar extension as well    Study Result    Narrative & Impression   LUMBAR SPINE     INDICATION:   M51 37:  Other intervertebral disc degeneration, lumbosacral region      COMPARISON: L-spine x-ray 4/25/2023     VIEWS:  XR SPINE LUMBAR 2 OR 3 VIEWS INJURY  Images: 2     FINDINGS:     There are 5 non rib bearing lumbar vertebral bodies      There is no evidence of acute fracture or destructive osseous lesion      Alignment is unremarkable      Mild intervertebral disc " base narrowing at L4-5 and L5-S1      The pedicles appear intact   No instability on flexion/extension views      Soft tissues are unremarkable      IMPRESSION:     No acute osseous abnormality         Workstation performed: [id number]            Imaging

## 2023-05-18 NOTE — TELEPHONE ENCOUNTER
Called patient, left message for him to call the office back to discuss  If patient calls back, please relay Dr Ana Oates message

## 2023-05-19 NOTE — TELEPHONE ENCOUNTER
Called patient, left message for him to call the office and discuss  Will also send MyChart message to patient

## 2023-05-22 ENCOUNTER — OFFICE VISIT (OUTPATIENT)
Dept: FAMILY MEDICINE CLINIC | Facility: CLINIC | Age: 48
End: 2023-05-22

## 2023-05-22 VITALS
RESPIRATION RATE: 16 BRPM | TEMPERATURE: 97.8 F | HEIGHT: 71 IN | WEIGHT: 188 LBS | BODY MASS INDEX: 26.32 KG/M2 | OXYGEN SATURATION: 98 % | DIASTOLIC BLOOD PRESSURE: 78 MMHG | HEART RATE: 87 BPM | SYSTOLIC BLOOD PRESSURE: 130 MMHG

## 2023-05-22 DIAGNOSIS — R79.89 LOW TESTOSTERONE: ICD-10-CM

## 2023-05-22 DIAGNOSIS — M47.816 LUMBAR SPONDYLOSIS: ICD-10-CM

## 2023-05-22 DIAGNOSIS — N52.8 OTHER MALE ERECTILE DYSFUNCTION: ICD-10-CM

## 2023-05-22 DIAGNOSIS — G89.29 CHRONIC MIDLINE LOW BACK PAIN WITHOUT SCIATICA: ICD-10-CM

## 2023-05-22 DIAGNOSIS — M54.50 CHRONIC MIDLINE LOW BACK PAIN WITHOUT SCIATICA: ICD-10-CM

## 2023-05-22 DIAGNOSIS — F32.0 CURRENT MILD EPISODE OF MAJOR DEPRESSIVE DISORDER, UNSPECIFIED WHETHER RECURRENT (HCC): ICD-10-CM

## 2023-05-22 DIAGNOSIS — E66.3 OVERWEIGHT: ICD-10-CM

## 2023-05-22 DIAGNOSIS — Q76.49 BERTOLOTTI'S SYNDROME: ICD-10-CM

## 2023-05-22 DIAGNOSIS — M79.18 MYOFASCIAL PAIN SYNDROME OF LUMBAR SPINE: ICD-10-CM

## 2023-05-22 DIAGNOSIS — F41.9 ANXIETY: ICD-10-CM

## 2023-05-22 DIAGNOSIS — I10 PRIMARY HYPERTENSION: Primary | ICD-10-CM

## 2023-05-22 RX ORDER — VENLAFAXINE HYDROCHLORIDE 150 MG/1
150 CAPSULE, EXTENDED RELEASE ORAL
Qty: 30 CAPSULE | Refills: 1 | Status: SHIPPED | OUTPATIENT
Start: 2023-05-22

## 2023-05-22 NOTE — ASSESSMENT & PLAN NOTE
Management per [facility]  Pending ALEC per Pain Management per  Hopefully Effexor XR 150mg daily to improve pain  Further Flexeril 10mg QD PRN per Pain Management

## 2023-05-22 NOTE — PROGRESS NOTES
Assessment/Plan:  Problem List Items Addressed This Visit        Cardiovascular and Mediastinum    Hypertension - Primary     Borderline BP today  Check blood pressure outside of office  Recommend lifestyle modifications  Nervous and Auditory    Bertolotti's syndrome     Management per [facility]  Pending ALEC per Pain Management per  Hopefully Effexor XR 150mg daily to improve pain  Further Flexeril 10mg QD PRN per Pain Management  Musculoskeletal and Integument    Lumbar spondylosis     Management per [facility]  Pending ALEC per Pain Management per  Hopefully Effexor XR 150mg daily to improve pain  Further Flexeril 10mg QD PRN per Pain Management  Myofascial pain syndrome of lumbar spine     Management per [facility]  Pending ALEC per Pain Management per  Hopefully Effexor XR 150mg daily to improve pain  Further Flexeril 10mg QD PRN per Pain Management  Other    Anxiety     Unchanged  Increase Effexor XR 150mg 1 pill daily  Continue counseling  Relevant Medications    venlafaxine (EFFEXOR-XR) 150 mg 24 hr capsule    Other Relevant Orders    Comprehensive metabolic panel    Depression     Unchanged  Increase Effexor XR 150mg 1 pill daily  Continue counseling  Relevant Medications    venlafaxine (EFFEXOR-XR) 150 mg 24 hr capsule    Other Relevant Orders    Comprehensive metabolic panel    Overweight     Stable  Recommend lifestyle modifications  ED (erectile dysfunction)     Stable  Continue Cialis 2 5mg QD  Back pain     Management per [facility]  Pending ALEC per Pain Management per  Hopefully Effexor XR 150mg daily to improve pain  Further Flexeril 10mg QD PRN per Pain Management  Low testosterone     Management per Uro  Pending labs to start Clomid vs  Testosterone                 Return in about 6 weeks (around "7/3/2023) for F/U HTN, HL, Anx/Dep, LBP, ED, Labs  Future Appointments   Date Time Provider Patricio Breeni   5/23/2023  7:30 AM Ted Hou PT AL PT [facility] [place] [place] [facility]   5/26/2023  7:30 AM Ted Hou PT AL PT [facility]   6/6/2023  1:20 PM EA AICHA OR 2 EA SP [address]   7/3/2023  9:00 AM Trang Trimble,  [facility]   7/7/2023  7:45 AM PRETTY [facility]        Subjective:     Olvin Chapa is a 49 y o  male who presents today for a follow-up on his chronic medical conditions  HPI:  Chief Complaint   Patient presents with   • Follow-up     F/U HTN, HL, Anx/Dep, LBP, ED, Labs     -- Above per clinical staff and reviewed  --      HPI      Today:    Return in 6 weeks (on 5/23/2023) for F/U HTN, HL, Anx/Dep, LBP, ED, Labs  PTO c Meagan      Overweight - Pending Exercise Nutritionist - Pending 5/22/23   Watching diet   He cut back on drinking soda and fast food   +Exercise - Weightlifting for 1 hour, 2 times per week  He would like to resume running      HTN - On Losartan 100mg QD   No other HTN meds previously   No BP check outside of office      Hyperlipidemia - No statin previously      H/O Grave's Disease / Hyperthyroidism - s/p Tapazole x 2 years, then radioactive iodine, which resulted in euthyroid status        Anxiety / Depression - On Effexor XR 75mg QD x 6 weeks  D/C Wellbutrin XL 300mg QD in AM and Cymbalta 90mg QD x 6 weeks as ineffective   Mood is unchanged and is \"not great' - since summer 2022 - increased stress at work and in marriage  Brian Dubin feels \"life is heavy,\" mind races, not sleeping well, is unmotivated  He feels Wellbutrin is not working, but cannot specifically state why    Previous Management per Psych at [address] [place] [address] E - last seen 2022  - F/U PRN as was D/C to PCP Management   On Wellbutrin XL 300mg QD in AM since 9/22   Previously on Zoloft, Prozac - did fine with these meds, Lexapro 20mg QD ineffective   No other mood " meds or higher dose previously  Lynette  counseling q2 weeks - marriage counseling with wife, and individual - helpful   Next appt    Poor social supports   No SI/HI/AH/VH        PHQ-2/9 Depression Screening    Little interest or pleasure in doing things: 1 - several days  Feeling down, depressed, or hopeless: 2 - more than half the days  Trouble falling or staying asleep, or sleeping too much: 2 - more than half the days  Feeling tired or having little energy: 2 - more than half the days  Poor appetite or overeatin - several days  Feeling bad about yourself - or that you are a failure or have let yourself or your family down: 1 - several days  Trouble concentrating on things, such as reading the newspaper or watching television: 0 - not at all  Moving or speaking so slowly that other people could have noticed  Or the opposite - being so fidgety or restless that you have been moving around a lot more than usual: 0 - not at all  Thoughts that you would be better off dead, or of hurting yourself in some way: 0 - not at all  PHQ-9 Score: 9   PHQ-9 Interpretation: Mild depression        CORA-7 Flowsheet Screening    Flowsheet Row Most Recent Value   Over the last 2 weeks, how often have you been bothered by any of the following problems? Feeling nervous, anxious, or on edge 1   Not being able to stop or control worrying 1   Worrying too much about different things 1   Trouble relaxing 1   Being so restless that it is hard to sit still 0   Becoming easily annoyed or irritable 1   Feeling afraid as if something awful might happen 0   CORA-7 Total Score 5          ED - Management per U[facility] Dr Rosario Mendez  Next appt  s/p hormonal labs  On Cialis 2 5mg QD - helpful   Patient feels due to Lexapro and Wellbutrin   D/C Viagra 50mg QD PRN - helpful, but desired daily Rx    No other ED meds or higher doses previously      Chronic Lower Back Pain / Lumbar Spondylosis / Myofascial Pain Syndrome of Lumbar Spine / "Bertolotti's Syndrome / Coccydynia  - Management per pain Management Dr Karli Roy  Next appt 6/5/23 for ALEC  Management per Karissa Adam - next appt PRN  On Effexor XR 75mg QD x 6 weeks   No change in back pain   Has daily spasms on medial sacrum, radiates into B/L buttocks   [place] [facility] [place] [facility]  [facility] is using Flexeril 10mg 1/2 -1 daily PRN - needing to use 1/2 pill daily as spasms are worsening, previously taking 3-4 times per week    Previously seen [facility] 2021   S/p ALEC s benefit   Last MRI in [place] in [id number]   Surgery was not advised per advised   Last PT in [place] 2018 s benefit  He is attending PT 2 days per week and doing HEP, but causes pain          Vitamin D Deficiency - No Drisdol   Taking MVI and OTC Vitamin D 5,000IU daily        Low Testosterone - Management per Uro Dr Rashid Álvarez  Next appt 6/23 s/p hormonal labs      H/O False Positive HIV test - s/p [facility] e-consult per Dr Bernie Cevallos 10/7/22, who advised ID F/U vs  PCP repeat the testing at three months, six months and one year, and include HIV PCR testing with the routine screening if it does not already reflex that way       Normal colonoscopy 11/12/20 by GI Dr Pamela Harris @ [facility]   Repeat in 10 years                From previous note:      Return in 6 weeks (on 4/11/2023) for F/U HTN, HL, Anx/Dep, LBP, ED, Labs         Overweight - Would like to see Nutritionist - Pending 5/3/23   Watching diet   He cut back on drinking soda and fast food    +Exercise - [facility] [employer] for 1 hour, 2-3 times per week        HTN - On Losartan 100mg QD   No other HTN meds previously   No BP check outside of office         Hyperlipidemia - No statin previously      H/O Grave's Disease / Hyperthyroidism - s/p Tapazole x 2 years, then radioactive iodine, which resulted in euthyroid status        Anxiety / Depression - On increased Cymbalta 90mg QD x 6 weeks   Mood is unchanged and is \"not great' - since summer 2022 - " "increased stress at work and in marriage  Juan Shine feels \"life is heavy,\" mind races, not sleeping well, is unmotivated  He feels Wellbutrin is not working, but cannot specifically state why  Previous Management per Psych at [address] [place] [address] E - last seen   - F/U PRN as was D/C to PCP Management   On Wellbutrin XL 300mg QD in AM since    Previously on Zoloft, Prozac - did fine with these meds, Lexapro 20mg QD ineffective   No other mood meds or higher dose previously  Lynette  counseling q2 weeks - marriage counseling with wife, and individual - helpful   Next appt    Poor social supports   No SI/HI/AH/VH        PHQ-2/9 Depression Screening       Little interest or pleasure in doing things: 1 - several days  Feeling down, depressed, or hopeless: 1 - several days  Trouble falling or staying asleep, or sleeping too much: 2 - more than half the days  Feeling tired or having little energy: 2 - more than half the days  Poor appetite or overeatin - not at all  Feeling bad about yourself - or that you are a failure or have let yourself or your family down: 1 - several days  Trouble concentrating on things, such as reading the newspaper or watching television: 0 - not at all  Moving or speaking so slowly that other people could have noticed   Or the opposite - being so fidgety or restless that you have been moving around a lot more than usual: 0 - not at all  Thoughts that you would be better off dead, or of hurting yourself in some way: 0 - not at all  PHQ-9 Score: 7   PHQ-9 Interpretation: Mild depression                    CORA-7 Flowsheet Screening    Flowsheet Row Most Recent Value   Over the last 2 weeks, how often have you been bothered by any of the following problems?     Feeling nervous, anxious, or on edge 1   Not being able to stop or control worrying 1   Worrying too much about different things 1   Trouble relaxing 1   Being so restless that it is hard to sit still 0   Becoming easily annoyed or irritable 1 " Feeling afraid as if something awful might happen 0   CORA-7 Total Score 5                ED - On Cialis 2 5mg QD - helpful   Patient feels due to Lexapro and Wellbutrin   D/C Viagra 50mg QD PRN - helpful, but desired daily Rx  No other ED meds or higher doses previously      Chronic Lower Back Pain / Lumbar Spondylosis / Myofascial Pain Syndrome of Lumbar Spine / Bertolotti's Syndrome  - On increased Cymbalta 90mg QD x 6 weeks   No change in back pain   Has daily spasms on medial sacrum, radiates into B/L buttocks   [facility] [facility] 2015 [facility]  [facility] is using Flexeril 10mg 1/2 -1 daily PRN - needing to use 1/2 pill daily as spasms are worsening, previously taking 3-4 times per week    Previously seen [facility] 2021   S/p ALEC s benefit   Last MRI in [place] in [id number]   Surgery was not advised per advised   Last PT in [place] 2018 s benefit        Vitamin D Deficiency - No Drisdol   Taking MVI and OTC Vitamin D 5,000IU daily        Low Testosterone - Pending [facility] consult Dr Marjorie Lyles 5/23      H/O False Positive HIV test - s/p [facility] e-consult per Dr Yodit Oquendo 10/7/22, who advised ID F/U vs  PCP repeat the testing at three months, six months and one year, and include HIV PCR testing with the routine screening if it does not already reflex that way       Normal colonoscopy 11/12/20 by GI Dr Reilly Cash @ [facility]   Repeat in 10 years                 From previous note:     Return in about 6 weeks (around 2/22/2023) for F/U HTN, HL, Anx/Dep, ED, LBP, Labs      Patient did not complete labs 2/12/2023      Overweight - Would like to see Nutritionist - Pending 5/3/23   Trying to watch diet   He cut back on drinking soda, but eats fast food    +Exercise - [place] [employer] for 1 hour, 2-3 times per week        HTN - On Losartan 100mg QD   No other HTN meds previously   No BP check outside of office         Hyperlipidemia - No statin previously      H/O  Grave's Disease / Hyperthyroidism - s/p Tapazole x 2 "years, then radioactive iodine, which resulted in euthyroid status        Anxiety / Depression - On Cymbalta 60mg QD x 6 weeks   Mood is unchanged and is \"not great' - since summer 2022 - increased stress at work and in marriage   Previous Management per Psych at [address] [place] [address] E - last seen   - F/U PRN as was D/C to PCP Management   On Wellbutrin XL 300mg QD in AM since    Previously on Zoloft, Prozac - did fine with these meds, Lexapro 20mg QD ineffective   No other mood meds or higher dose previously  Fitz Slider counseling q2 weeks - marriage counseling with wife, and individual - helpful   Next appt    Poor social supports   No SI/HI/AH/VH           PHQ-2/9 Depression Screening       Little interest or pleasure in doing things: 1 - several days  Feeling down, depressed, or hopeless: 1 - several days  Trouble falling or staying asleep, or sleeping too much: 1 - several days  Feeling tired or having little energy: 2 - more than half the days  Poor appetite or overeatin - several days  Feeling bad about yourself - or that you are a failure or have let yourself or your family down: 1 - several days  Trouble concentrating on things, such as reading the newspaper or watching television: 0 - not at all  Moving or speaking so slowly that other people could have noticed   Or the opposite - being so fidgety or restless that you have been moving around a lot more than usual: 0 - not at all  Thoughts that you would be better off dead, or of hurting yourself in some way: 0 - not at all  PHQ-9 Score: 7   PHQ-9 Interpretation: Mild depression                      CORA-7 Flowsheet Screening    Flowsheet Row Most Recent Value   Over the last 2 weeks, how often have you been bothered by any of the following problems?     Feeling nervous, anxious, or on edge 0   Not being able to stop or control worrying 0   Worrying too much about different things 0   Trouble relaxing 1   Being so restless that it is hard to sit still 0 " Becoming easily annoyed or irritable 0   Feeling afraid as if something awful might happen 0   CORA-7 Total Score 1             ED - On Cialis 2 5mg QD x 6 weeks - helpful   Patient feels due to Lexapro and Wellbutrin   D/C Viagra 50mg QD PRN - helpful, but desired daily Rx   Patient is interested in daily [place] is unsure if Rx is covered by insurance  No other ED meds or higher doses previously      Chronic Lower Back Pain / Lumbar Spondylosis / Myofascial Pain Syndrome of Lumbar Spine / Bertolotti's Syndrome  - On Cymbalta 60mg QD x 6 weeks   No change in back pain   Has daily spasms on medial sacrum, radiates into B/L buttocks   [place] [facility] [place] [facility]  [facility] is using Flexeril 10mg 1/2 -1 daily PRN - taking 3-4 times per week    Previously seen [facility] 2021   S/p ALEC s benefit   Last MRI in [place] in [id number]   Surgery was not advised per advised   Last PT in [place] 2018 s benefit        Vitamin D Deficiency - No Drisdol   Taking MVI, but taking OTC Vitamin D 5,000IU daily        H/O False Positive HIV test - s/p [facility] e-consult per Dr Ashanti Suarez 10/7/22, who advised ID F/U vs  PCP repeat the testing at three months, six months and one year, and include HIV PCR testing with the routine screening if it does not already reflex that way       Normal colonoscopy 11/12/20 by GI Dr Meaghan Donis @ [facility]   Repeat in 10 years           From previous note:     Controlled Substance Review     [place] PDMP or [place]  reviewed: No red flags were identified; safe to proceed with prescription       Hair Loss - Wants to know if Nutrafol is safe   He had not tried Rogaine yet        Overweight - Would like to see Nutritionist   Not watching diet   Drinks soda, eats fast food    +Exercise - [place] [employer] for 1 hours 2-3 times per week        HTN - On Losartan 100mg QD   No other HTN meds previously   No BP check outside of office         Hyperlipidemia - No statin previously      H/O  Grave's Disease / "Hyperthyroidism - s/p Tapazole x 2 years, then radioactive iodine, which resulted in euthyroid status        Anxiety / Depression - Mood is \"not great' - since summer 2022 - increased stress at work and in marriage   Previous Management per Psych at [address] [place] [address] E - last seen   - F/U PRN as was D/C to PCP Management   On Lexapro 20mg QD since  and Wellbutrin XL 300mg QD in AM since    Previously on Zoloft, Prozac - did fine with these meds   No other mood meds or higher dose previously  [place] [employer] counseling q2 weeks   Next appt          PHQ-2/9 Depression Screening       Little interest or pleasure in doing things: 2 - more than half the days  Feeling down, depressed, or hopeless: 2 - more than half the days  Trouble falling or staying asleep, or sleeping too much: 2 - more than half the days  Feeling tired or having little energy: 2 - more than half the days  Poor appetite or overeatin - not at all  Feeling bad about yourself - or that you are a failure or have let yourself or your family down: 1 - several days  Trouble concentrating on things, such as reading the newspaper or watching television: 0 - not at all  Moving or speaking so slowly that other people could have noticed   Or the opposite - being so fidgety or restless that you have been moving around a lot more than usual: 0 - not at all  Thoughts that you would be better off dead, or of hurting yourself in some way: 0 - not at all  PHQ-2 Score: 4  PHQ-2 Interpretation: POSITIVE depression screen  PHQ-9 Score: 9   PHQ-9 Interpretation: Mild depression                      CORA-7 Flowsheet Screening    Flowsheet Row Most Recent Value   Over the last 2 weeks, how often have you been bothered by any of the following problems?     Feeling nervous, anxious, or on edge 1   Not being able to stop or control worrying 2   Worrying too much about different things 2   Trouble relaxing 2   Being so restless that it is hard to sit still 0   Becoming easily " annoyed or irritable 1   Feeling afraid as if something awful might happen 0   CORA-7 Total Score 8          MDQ:  0, Asynchronous, No Problem     ED - Patient feels due to Lexapro and Wellbutrin   On Viagra 50mg QD PRN - helpful   Patient is interested in daily Geovani Kraus is unsure if Rx is covered by insurance  No other ED meds or higher doses previously      Chronic Lower Back Pain / Lumbar Spondylosis / Myofascial Pain Syndrome of Lumbar Spine / Bertolotti's Syndrome  - Has daily spasms on medical sacrum, radiates into B/L buttocks   [place] [facility] [place] [facility] [place] [facility]  [place] [facility] is using Flexeril 10mg 1/2 -1 daily PRN - taking 3-4 times per week    Previously seen [facility] 2021   S/p ALEC s benefit   Last MRI in [place] in [id number]   Surgery was not advised per advised   Last PT in [place] 2018 s benefit        Vitamin D Deficiency - No Drisdol   Not taking MVI, but taking OTC Vitamin D 5,000IU daily        H/O False Positive HIV test - s/p [facility] e-consult per Dr Brandi Gomez 10/7/22, who advised ID F/U vs  PCP repeat the testing at three months, six months and one year, and include HIV PCR testing with the routine screening if it does not already reflex that way       Normal colonoscopy 11/12/20 by GI Dr Shauna Montiel @ [facility]   Repeat in 10 years        Reviewed:  Labs 5/18/23, Pain Management 5/18/23, Nutrition 5/3/23, Uro 5/9/23, Ortho 5/3/23     Sees Dentist q6 months  Sees Optho q2 years  The following portions of the patient's history were reviewed and updated as appropriate: allergies, current medications, past family history, past medical history, past social history, past surgical history and problem list       Review of Systems   Constitutional: Positive for fatigue  Negative for appetite change, chills, diaphoresis and fever  Respiratory: Negative for chest tightness and shortness of breath  Cardiovascular: Negative for chest pain  Gastrointestinal: Positive for nausea (With anorexia)  "Negative for abdominal pain, blood in stool, diarrhea and vomiting  Genitourinary: Negative for dysuria  Musculoskeletal: Positive for back pain  Current Outpatient Medications   Medication Sig Dispense Refill   • Cholecalciferol (Vitamin D3) 125 MCG (5000 UT) TABS Take 5,000 Units by mouth daily     • [employer] FIBER GUMMIES PO Take 2 tablets by mouth in the morning     • cyclobenzaprine (FLEXERIL) 10 mg tablet Take 1 tablet (10 mg total) by mouth daily as needed for muscle spasms 30 tablet 0   • losartan (COZAAR) 100 MG tablet Take 1 tablet (100 mg total) by mouth daily 90 tablet 2   • meloxicam (MOBIC) 15 mg tablet Take 1 tablet (15 mg total) by mouth daily 30 tablet 0   • Multiple Vitamin (multivitamin) tablet Take 1 tablet by mouth daily     • tadalafil (CIALIS) 2 5 MG tablet Take 1 tablet (2 5 mg total) by mouth daily 90 tablet 2   • venlafaxine (EFFEXOR-XR) 150 mg 24 hr capsule Take 1 capsule (150 mg total) by mouth daily with breakfast 30 capsule 1     No current facility-administered medications for this visit  Objective:  /78   Pulse 87   Temp 97 8 °F (36 6 °C)   Resp 16   Ht 5' 10 5\" (1 791 m)   Wt 85 3 kg (188 lb)   SpO2 98%   BMI 26 59 kg/m²    Wt Readings from Last 3 Encounters:   05/22/23 85 3 kg (188 lb)   05/18/23 85 7 kg (189 lb)   05/09/23 84 2 kg (185 lb 9 6 oz)      BP Readings from Last 3 Encounters:   05/22/23 130/78   05/18/23 154/89   05/09/23 124/86          Physical Exam  Vitals and nursing note reviewed  Constitutional:       Appearance: Normal appearance  He is well-developed  HENT:      Head: Normocephalic and atraumatic  Eyes:      Conjunctiva/sclera: Conjunctivae normal    Neck:      Thyroid: No thyromegaly  Cardiovascular:      Rate and Rhythm: Normal rate and regular rhythm  Pulses: Normal pulses  Heart sounds: Normal heart sounds  Pulmonary:      Effort: Pulmonary effort is normal       Breath sounds: Normal breath sounds   " Musculoskeletal:         General: No swelling or tenderness  Cervical back: Neck supple  Right lower leg: No edema  Left lower leg: No edema  Lymphadenopathy:      Cervical: No cervical adenopathy  Neurological:      General: No focal deficit present  Mental Status: He is alert and oriented to person, place, and time  Mental status is at baseline  Psychiatric:         Attention and Perception: Attention and perception normal          Mood and Affect: Mood is depressed  Affect is flat  Speech: Speech normal          Behavior: Behavior normal  Behavior is cooperative  Thought Content: Thought content normal          Cognition and Memory: Cognition and memory normal          Judgment: Judgment normal          Lab Results:      Lab Results   Component Value Date    WBC 6 32 02/09/2023    HGB 16 6 02/09/2023    HCT 52 2 (H) 02/09/2023     02/09/2023    TRIG 207 (H) 02/09/2023    HDL 37 (L) 02/09/2023    LDLDIRECT 116 (H) 02/09/2023    ALT 29 05/18/2023    AST 16 05/18/2023    K 4 7 05/18/2023     05/18/2023    CREATININE 1 07 05/18/2023    BUN 19 05/18/2023    CO2 28 05/18/2023    GLUF 100 (H) 05/18/2023    HGBA1C 5 5 02/09/2023     No results found for: URICACID  Invalid input(s): BASENAME Vitamin D    XR spine lumbar 2 or 3 views injury    Result Date: 5/6/2023  Narrative: LUMBAR SPINE INDICATION:   M51 37: Other intervertebral disc degeneration, lumbosacral region  COMPARISON: L-spine x-ray 4/25/2023 VIEWS:  XR SPINE LUMBAR 2 OR 3 VIEWS INJURY Images: 2 FINDINGS: There are 5 non rib bearing lumbar vertebral bodies  There is no evidence of acute fracture or destructive osseous lesion  Alignment is unremarkable  Mild intervertebral disc base narrowing at L4-5 and L5-S1  The pedicles appear intact  No instability on flexion/extension views  Soft tissues are unremarkable  Impression: No acute osseous abnormality   Workstation performed: [id number]     XR spine lumbar 2 or 3 views injury    Result Date: 4/28/2023  Narrative: LUMBAR SPINE INDICATION:   M54 16: Radiculopathy, lumbar region  COMPARISON:  None VIEWS:  XR SPINE LUMBAR 2 OR 3 VIEWS INJURY FINDINGS: There are 5 non rib bearing lumbar vertebral bodies  There is no evidence of acute fracture or destructive osseous lesion  Alignment is unremarkable  No significant lumbar degenerative change noted  The pedicles appear intact  Soft tissues are unremarkable  Impression: No acute osseous abnormality   Workstation performed: [id number]        [facility]

## 2023-05-23 ENCOUNTER — OFFICE VISIT (OUTPATIENT)
Dept: PHYSICAL THERAPY | Facility: CLINIC | Age: 48
End: 2023-05-23

## 2023-05-23 DIAGNOSIS — M51.37 DDD (DEGENERATIVE DISC DISEASE), LUMBOSACRAL: ICD-10-CM

## 2023-05-23 DIAGNOSIS — Q76.49 BERTOLOTTI'S SYNDROME: ICD-10-CM

## 2023-05-23 DIAGNOSIS — G89.29 CHRONIC MIDLINE LOW BACK PAIN WITHOUT SCIATICA: Primary | ICD-10-CM

## 2023-05-23 DIAGNOSIS — M54.50 CHRONIC MIDLINE LOW BACK PAIN WITHOUT SCIATICA: Primary | ICD-10-CM

## 2023-05-23 NOTE — PROGRESS NOTES
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       3  DDD (degenerative disc disease), lumbosacral  M51 37                      Subjective: Pt states that he was 'light up' following the HEP this last few days  States that he is also scheduled for an injection to his coccyx early   Objective: See treatment diary below      Assessment: Tolerated treatment well  HEP was reviewed and adjusted to limit increase of symptoms when performing  Questions by patient were answered regarding HEP and changes  Manual techniques were performed to increase lumbosacral joint mobility and to decrease pain  Pt reported pain with grade 1 joint mobilizations  Activities were also performed to increase core and LE strength within tolerance  Patient demonstrated fatigue post treatment and would benefit from continued PT      Plan: Continue per plan of care        Precautions: HTN, anxiety,     Daily Treatment Diary    Date           FOTO IE -             Re-Eval IE               Manuals    Lumbar mobs  JM - grade 1 JM - grade 1          Paraspinal TPR  JM JM          Side-lying lumbar mob             Hip PROM  JM JM          Supine sacral mob  JM - grade 2  JM - grade 2     W/ FABIEN - overpressure                       Neuro Re-Ed                                                                                                Ther Ex    Bike   3 min  3 min           Hamstring stretch   manual           Piriformis stretch    manual          LTRs             Bridges with PPT PPT only 10x  2x10  PPT only for HEP          Supine HIP ISO alternating  3\" hold 10x ea direction  3\" hold 2x ea direciton            Supine core ISO w/ TBall  2x15  2x15          Clamshell  2x15 OTB HEP      " "    Standing hip abd/ext  NV OTB 2x10 ea (B)          Child's pose   5\"x5 DKTC 5\"x5          Cat/camel   10x            Bird dog             Standing Pallof Press  On blue TBall 15x ea double blue TB 2x10 ea double blue TB          Side stepping w/ TB  Double Green 8x ea direction [place] 9# 8x ea direction           Mini squat  On wall w/ Tball 2x10  LEG PRESS 115# 3x10                       Ther Activity                              Gait Training                              Modalities    Heat vs Ice PRN                                     "

## 2023-05-26 ENCOUNTER — APPOINTMENT (OUTPATIENT)
Dept: LAB | Facility: CLINIC | Age: 48
End: 2023-05-26

## 2023-05-26 ENCOUNTER — OFFICE VISIT (OUTPATIENT)
Dept: PHYSICAL THERAPY | Facility: CLINIC | Age: 48
End: 2023-05-26

## 2023-05-26 DIAGNOSIS — M51.37 DDD (DEGENERATIVE DISC DISEASE), LUMBOSACRAL: ICD-10-CM

## 2023-05-26 DIAGNOSIS — G89.29 CHRONIC MIDLINE LOW BACK PAIN WITHOUT SCIATICA: Primary | ICD-10-CM

## 2023-05-26 DIAGNOSIS — M54.50 CHRONIC MIDLINE LOW BACK PAIN WITHOUT SCIATICA: Primary | ICD-10-CM

## 2023-05-26 DIAGNOSIS — R79.89 LOW TESTOSTERONE IN MALE: ICD-10-CM

## 2023-05-26 DIAGNOSIS — Q76.49 BERTOLOTTI'S SYNDROME: ICD-10-CM

## 2023-05-26 LAB — PSA SERPL-MCNC: 0.64 NG/ML (ref 0–4)

## 2023-05-26 NOTE — PROGRESS NOTES
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       3  DDD (degenerative disc disease), lumbosacral  M51 37                      Subjective: Pt states that the updated HEP felt much better than previously  Denies any other changes since last session  Objective: See treatment diary below      Assessment: Tolerated treatment well  Tolerated additional core strengthening activities today to improve lumbopelvic stability  Pt reported pain with standing hip abduction and oblique strengthening activities  Additional activities were added to HEP to address noted limitations  Adequately challnege with current level of POC  Patient demonstrated fatigue post treatment and would benefit from continued PT      Plan: Continue per plan of care        Precautions: HTN, anxiety,     Daily Treatment Diary    Date          FOTO IE -             Re-Eval IE               Manuals    Lumbar mobs  JM - grade 1 JM - grade 1 JM - grade 1         Paraspinal TPR  JM JM          Side-lying lumbar mob             Hip PROM  JM JM JM         Supine sacral mob  JM - grade 2  JM - grade 2     W/ FABIEN - overpressure JM - grade 2     W/ FABIEN - overpressure                      Neuro Re-Ed                                                                                                Ther Ex    Bike   3 min  3 min  NV         Hamstring stretch   manual  manual          Piriformis stretch    manual manual          LTRs             Bridges with PPT PPT only 10x  2x10  PPT only for HEP          Supine HIP ISO alternating  3\" hold 10x ea direction  3\" hold 2x ea direciton            Supine core ISO w/ TBall  2x15  2x15 Dead bug 3x6 holding TBall         Clamshell  2x15 OTB HEP          Standing " "hip abd/ext  NV OTB 2x10 ea (B) Hip machine 40# 2x10 abd only         Child's pose   5\"x5 DKTC 5\"x5 DKTC 5\"x5         Cat/camel   10x   10x         Bird dog    2x5 ea LE only         Standing Pallof Press  On blue TBall 15x ea double blue TB 2x10 ea double blue TB 2x10 10# ea direction          Side stepping w/ TB  Double Green 8x ea direction [place] 9# 8x ea direction  [place]am 10# 8x ea direction          Mini squat  On wall w/ Tball 2x10  LEG PRESS 115# 3x10 LEG PRESS 115# 3x10         Side plank    Half plank 15\"x3 ea direction                                   Ther Activity                              Gait Training                              Modalities    Heat vs Ice PRN                                       "

## 2023-06-05 ENCOUNTER — HOSPITAL ENCOUNTER (OUTPATIENT)
Dept: RADIOLOGY | Facility: HOSPITAL | Age: 48
Discharge: HOME | End: 2023-06-05
Attending: ORTHOPAEDIC SURGERY

## 2023-06-06 ENCOUNTER — HOSPITAL ENCOUNTER (OUTPATIENT)
Dept: RADIOLOGY | Facility: MEDICAL CENTER | Age: 48
Discharge: HOME | End: 2023-06-06
Payer: COMMERCIAL

## 2023-06-06 ENCOUNTER — TELEPHONE (OUTPATIENT)
Dept: UROLOGY | Facility: MEDICAL CENTER | Age: 48
End: 2023-06-06

## 2023-06-06 VITALS
TEMPERATURE: 98.3 F | DIASTOLIC BLOOD PRESSURE: 80 MMHG | HEART RATE: 75 BPM | RESPIRATION RATE: 18 BRPM | SYSTOLIC BLOOD PRESSURE: 134 MMHG | OXYGEN SATURATION: 93 %

## 2023-06-06 DIAGNOSIS — M53.3 COCCYDYNIA: ICD-10-CM

## 2023-06-06 PROCEDURE — 77002 NEEDLE LOCALIZATION BY XRAY: CPT | Performed by: PHYSICAL MEDICINE & REHABILITATION

## 2023-06-06 PROCEDURE — 20605 DRAIN/INJ JOINT/BURSA W/O US: CPT | Performed by: PHYSICAL MEDICINE & REHABILITATION

## 2023-06-06 RX ORDER — BUPIVACAINE HCL/PF 2.5 MG/ML
3 VIAL (ML) INJECTION ONCE
Status: COMPLETED | OUTPATIENT
Start: 2023-06-06 | End: 2023-06-06

## 2023-06-06 RX ORDER — METHYLPREDNISOLONE ACETATE 40 MG/ML
40 INJECTION, SUSPENSION INTRA-ARTICULAR; INTRALESIONAL; INTRAMUSCULAR; PARENTERAL; SOFT TISSUE ONCE
Status: COMPLETED | OUTPATIENT
Start: 2023-06-06 | End: 2023-06-06

## 2023-06-06 RX ADMIN — IOHEXOL 2 ML: 300 INJECTION, SOLUTION INTRAVENOUS at 13:30

## 2023-06-06 RX ADMIN — METHYLPREDNISOLONE ACETATE 40 MG: 40 INJECTION, SUSPENSION INTRA-ARTICULAR; INTRALESIONAL; INTRAMUSCULAR; PARENTERAL; SOFT TISSUE at 13:30

## 2023-06-06 RX ADMIN — Medication 3 ML: at 13:30

## 2023-06-06 NOTE — TELEPHONE ENCOUNTER
psa looks great  The rest of the hormone panel for testosterone/lh/fsh/shbg were not drawn  Those should be drawn on early AM lab date  Dr Ivon Luis will reach out once those are resulted  Per his note was looking for labs to decide before clomid rx      Lab Results   Component Value Date    PSA 0 64 05/26/2023

## 2023-06-06 NOTE — DISCHARGE INSTRUCTIONS
Steroid Joint Injection   WHAT YOU NEED TO KNOW:   A steroid joint injection is a procedure to inject steroid medicine into a joint  Steroid medicine decreases pain and inflammation  The injection may also contain an anesthetic (numbing medicine) to decrease pain  It may be done to treat conditions such as arthritis, gout, or carpal tunnel syndrome  The injections may be given in your knee, ankle, shoulder, elbow, wrist, ankle or sacroiliac joint  Do not apply heat to any area that is numb  If you have discomfort or soreness at the injection site, you may apply ice today, 20 minutes on and 20 minutes off  Tomorrow you may use ice or warm, moist heat  Do not apply ice or heat directly to the skin  You may have an increase or change in the discomfort for 36-48 hours after your treatment  Apply ice and continue with any pain medicine you have been prescribed  Do not do anything strenuous today  You may shower, but no tub baths or hot tubs today  You may resume your normal activities tomorrow, but do not “overdo it”  Resume normal activities slowly when you are feeling better  If you experience redness, drainage or swelling at the injection site, or if you develop a fever above 100 degrees, please call [facility] at [phone] or go to the Emergency Room  Continue to take all routine medicines prescribed by your primary care physician unless otherwise instructed by our staff  Most blood thinners should be started again according to your regularly scheduled dosing  If you have any questions, please give our office a call  As no general anesthesia was used in today's procedure, you should not experience any side effects related to anesthesia  If you are diabetic, the steroids used in today's injection may temporarily increase your blood sugar levels after the first few days after your injection   Please keep a close eye on your sugars and alert the doctor who manages your diabetes if your sugars are significantly high from your baseline or you are symptomatic  If you have a problem specifically related to your procedure, please call our office at [phone]  Problems not related to your procedure should be directed to your primary care physician

## 2023-06-06 NOTE — H&P
History of Present Illness:  The patient is a 49 y o  male who presents with complaints of tailbone pain    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   • Depression    • ED (erectile dysfunction)    • History of Graves' disease     s/p Tapazole x 2 years, then radioactive iodine, which resulted in euthyroid status   • Hyperlipidemia    • Hypertension    • Low testosterone 5/22/2023   • Lumbar spondylosis 11/16/2020   • Myofascial pain syndrome of lumbar spine 12/14/2020   • Overweight    • Vitamin D deficiency        Past Surgical History:   Procedure Laterality Date   • NO PAST SURGERIES           Current Outpatient Medications:   •  Cholecalciferol (Vitamin D3) 125 MCG (5000 UT) TABS, Take 5,000 Units by mouth daily, Disp: , Rfl:   •  [employer] FIBER GUMMIES PO, Take 2 tablets by mouth in the morning, Disp: , Rfl:   •  cyclobenzaprine (FLEXERIL) 10 mg tablet, Take 1 tablet (10 mg total) by mouth daily as needed for muscle spasms, Disp: 30 tablet, Rfl: 0  •  losartan (COZAAR) 100 MG tablet, Take 1 tablet (100 mg total) by mouth daily, Disp: 90 tablet, Rfl: 2  •  meloxicam (MOBIC) 15 mg tablet, Take 1 tablet (15 mg total) by mouth daily, Disp: 30 tablet, Rfl: 0  •  Multiple Vitamin (multivitamin) tablet, Take 1 tablet by mouth daily, Disp: , Rfl:   •  tadalafil (CIALIS) 2 5 MG tablet, Take 1 tablet (2 5 mg total) by mouth daily, Disp: 90 tablet, Rfl: 2  •  venlafaxine (EFFEXOR-XR) 150 mg 24 hr capsule, Take 1 capsule (150 mg total) by mouth daily with breakfast, Disp: 30 capsule, Rfl: 1    No Known Allergies    Physical Exam:   Vitals:    06/06/23 1307   BP: 133/80   Pulse: 71   Resp: 18   Temp: 98 3 °F (36 8 °C)   SpO2: 95%     General: Awake, Alert, Oriented x 3, Mood and affect appropriate  Respiratory: Respirations even and unlabored  Cardiovascular: Peripheral pulses intact; no edema  Musculoskeletal Exam: tailbone pain    ASA Score: 2    Patient/Chart Verification  Patient ID Verified: Verbal  ID Band Applied: No  Consents Confirmed: Procedural, To be obtained in the Pre-Procedure area  H&P( within 30 days) Verified: To be obtained in the Pre-Procedure area  Interval H&P(within 24 hr) Complete (required for Outpatients and Surgery Admit only): To be obtained in the Pre-Procedure area  Allergies Reviewed: Yes  Anticoag/NSAID held?: NA  Currently on antibiotics?: No    Assessment:   1   Coccydynia        Plan: SACROCOCCYGEAL JT INJ

## 2023-06-06 NOTE — TELEPHONE ENCOUNTER
Patient of Dr Shauna Ham at [facility]     Patient called stating he did his psa test and would like to know the results and see when he is to follow up with Dr Shauna Ham      Patient can be reached at [phone]

## 2023-06-07 NOTE — TELEPHONE ENCOUNTER
Called and left detailed VM for patient per communication consent with normal PSA results  Advised patient we are awaiting hormone panel prior to deciding on medication  Asked patient to present to [place] [id number] [facility] first thing any am that is convenient for him to have blood work completed and office will contact him with results and plan moving forward  Asked him to call back with any questions or concerns

## 2023-06-13 ENCOUNTER — TELEPHONE (OUTPATIENT)
Dept: PAIN MEDICINE | Facility: CLINIC | Age: 48
End: 2023-06-13

## 2023-06-20 ENCOUNTER — APPOINTMENT (OUTPATIENT)
Dept: LAB | Facility: CLINIC | Age: 48
End: 2023-06-20
Payer: COMMERCIAL

## 2023-06-20 DIAGNOSIS — R79.89 LOW TESTOSTERONE IN MALE: ICD-10-CM

## 2023-06-20 LAB
FSH SERPL-ACNC: 2.9 MIU/ML
LH SERPL-ACNC: 1.7 MIU/ML
PROLACTIN SERPL-MCNC: 8.64 NG/ML
SHBG SERPL-SCNC: 16.8 NMOL/L (ref 16.5–55.9)

## 2023-06-20 PROCEDURE — 83002 ASSAY OF GONADOTROPIN (LH): CPT

## 2023-06-20 PROCEDURE — 84146 ASSAY OF PROLACTIN: CPT

## 2023-06-20 PROCEDURE — 83001 ASSAY OF GONADOTROPIN (FSH): CPT

## 2023-06-20 PROCEDURE — 36415 COLL VENOUS BLD VENIPUNCTURE: CPT

## 2023-06-20 PROCEDURE — 84270 ASSAY OF SEX HORMONE GLOBUL: CPT

## 2023-06-27 ENCOUNTER — PATIENT MESSAGE (OUTPATIENT)
Dept: FAMILY MEDICINE CLINIC | Facility: CLINIC | Age: 48
End: 2023-06-27

## 2023-06-27 DIAGNOSIS — F41.9 ANXIETY: ICD-10-CM

## 2023-06-27 DIAGNOSIS — F32.0 CURRENT MILD EPISODE OF MAJOR DEPRESSIVE DISORDER, UNSPECIFIED WHETHER RECURRENT (HCC): ICD-10-CM

## 2023-06-27 DIAGNOSIS — R79.89 LOW TESTOSTERONE IN MALE: Primary | ICD-10-CM

## 2023-06-27 RX ORDER — VENLAFAXINE HYDROCHLORIDE 150 MG/1
150 CAPSULE, EXTENDED RELEASE ORAL
Qty: 30 CAPSULE | Refills: 1 | Status: SHIPPED | OUTPATIENT
Start: 2023-06-27 | End: 2023-07-03 | Stop reason: SDUPTHER

## 2023-06-28 ENCOUNTER — TELEPHONE (OUTPATIENT)
Dept: UROLOGY | Facility: CLINIC | Age: 48
End: 2023-06-28

## 2023-06-28 NOTE — TELEPHONE ENCOUNTER
Spoke with patient and he is scheduled with Waqar Rice at [facility] 8/23/23 at 9:15

## 2023-06-28 NOTE — TELEPHONE ENCOUNTER
Regarding: RE: Lab work   Contact: [phone]  Please schedule 8-week follow-up    ----- Message -----  From: Key Arvizu  Sent: 6/27/2023  11:36 AM EDT  To: [facility] [place] [facility] Provider  Subject: Lab work                                         ----- Message from Key Arvizu sent at 6/27/2023 11:36 AM EDT -----       ----- Message from Aga Banda to Demetrius Mar MD sent at 6/27/2023 10:53 AM -----   Hello , I received my lab work results and found everything to be normal  I was wanting to know from Dr Yanely Mccarthy where we go from here regarding my testosterone levels and what he wants to do next  Thank you       ----- Message -----       From:Ivanna Zapata       Sent:6/8/2023  9:54 AM EDT         To:Rojelio Lorenzo    Subject:Lab work     Good morning, Salome Shah,    The other tests were lh/fsh/sex hormone binding and prolactin  These should be completed as early as possible in the morning, but they are not considered fasting  These tests are already ordered in your chart  Have a great day,    Kenn Engel       ----- Message -----       From:Rojelio Lorenzo       Sent:6/8/2023  9:27 AM EDT         To:Brendan Armenta    Subject:Lab work     Hello , I received your message about needing further lab work that was not done when I had my PSA test done    Can you tell me what it is I need to have drawn

## 2023-07-03 ENCOUNTER — OFFICE VISIT (OUTPATIENT)
Dept: FAMILY MEDICINE CLINIC | Facility: CLINIC | Age: 48
End: 2023-07-03
Payer: COMMERCIAL

## 2023-07-03 VITALS
TEMPERATURE: 96.9 F | DIASTOLIC BLOOD PRESSURE: 80 MMHG | SYSTOLIC BLOOD PRESSURE: 132 MMHG | OXYGEN SATURATION: 97 % | HEART RATE: 80 BPM | BODY MASS INDEX: 25.59 KG/M2 | HEIGHT: 71 IN | WEIGHT: 182.8 LBS

## 2023-07-03 DIAGNOSIS — M47.816 LUMBAR SPONDYLOSIS: ICD-10-CM

## 2023-07-03 DIAGNOSIS — F32.0 CURRENT MILD EPISODE OF MAJOR DEPRESSIVE DISORDER, UNSPECIFIED WHETHER RECURRENT (HCC): ICD-10-CM

## 2023-07-03 DIAGNOSIS — I10 PRIMARY HYPERTENSION: ICD-10-CM

## 2023-07-03 DIAGNOSIS — F41.9 ANXIETY: Primary | ICD-10-CM

## 2023-07-03 DIAGNOSIS — M53.3 COCCYDYNIA: ICD-10-CM

## 2023-07-03 DIAGNOSIS — R79.89 LOW TESTOSTERONE: ICD-10-CM

## 2023-07-03 DIAGNOSIS — E66.3 OVERWEIGHT: ICD-10-CM

## 2023-07-03 DIAGNOSIS — M79.18 MYOFASCIAL PAIN SYNDROME OF LUMBAR SPINE: ICD-10-CM

## 2023-07-03 DIAGNOSIS — Q76.49 BERTOLOTTI'S SYNDROME: ICD-10-CM

## 2023-07-03 DIAGNOSIS — N52.8 OTHER MALE ERECTILE DYSFUNCTION: ICD-10-CM

## 2023-07-03 PROCEDURE — 99214 OFFICE O/P EST MOD 30 MIN: CPT | Performed by: FAMILY MEDICINE

## 2023-07-03 RX ORDER — VENLAFAXINE HYDROCHLORIDE 150 MG/1
CAPSULE, EXTENDED RELEASE ORAL
Qty: 30 CAPSULE | Refills: 1 | Status: SHIPPED | OUTPATIENT
Start: 2023-07-03

## 2023-07-03 RX ORDER — VENLAFAXINE HYDROCHLORIDE 75 MG/1
CAPSULE, EXTENDED RELEASE ORAL
Qty: 30 CAPSULE | Refills: 1 | Status: SHIPPED | OUTPATIENT
Start: 2023-07-03

## 2023-07-03 NOTE — ASSESSMENT & PLAN NOTE
Management per [facility]. Hopefully Effexor XR 225mg daily to improve pain. Further Flexeril 10mg QD PRN per Pain Management.

## 2023-07-03 NOTE — ASSESSMENT & PLAN NOTE
Stable. Continue Cialis 2.5mg QD. Pending Clomid initiation per Uro, which may help increase Testosterone levels.

## 2023-07-03 NOTE — PROGRESS NOTES
Assessment/Plan:  Problem List Items Addressed This Visit        Cardiovascular and Mediastinum    Hypertension     Borderline BP today. Check blood pressure outside of office. Recommend lifestyle modifications. Nervous and Auditory    Bertolotti's syndrome     Management per [facility]. Hopefully Effexor XR 225mg daily to improve pain. Further Flexeril 10mg QD PRN per Pain Management. Musculoskeletal and Integument    Lumbar spondylosis     Management per [facility]. Hopefully Effexor XR 225mg daily to improve pain. Further Flexeril 10mg QD PRN per Pain Management. Myofascial pain syndrome of lumbar spine     Management per [facility]. Hopefully Effexor XR 225mg daily to improve pain. Further Flexeril 10mg QD PRN per Pain Management. Other    Anxiety - Primary     Unchanged. Increase Effexor XR 225mg 1 pill daily. Continue counseling. Relevant Medications    venlafaxine (EFFEXOR-XR) 150 mg 24 hr capsule    venlafaxine (EFFEXOR-XR) 75 mg 24 hr capsule    Depression     Unchanged. Increase Effexor XR 225mg 1 pill daily. Continue counseling. Relevant Medications    venlafaxine (EFFEXOR-XR) 150 mg 24 hr capsule    venlafaxine (EFFEXOR-XR) 75 mg 24 hr capsule    Overweight     Improved. Recommend lifestyle modifications. ED (erectile dysfunction)     Stable. Continue Cialis 2.5mg QD. Pending Clomid initiation per Uro, which may help increase Testosterone levels. Coccydynia     Management per [facility]. Hopefully Effexor XR 225mg daily to improve pain. Further Flexeril 10mg QD PRN per Pain Management. Low testosterone     Stable. Continue Cialis 2.5mg QD. Pending Clomid initiation per Uro, which may help increase Testosterone levels.                Return in about 6 weeks (around 8/14/2023) for F/U HTN, HL, Anx/Dep, LBP, ED, Labs. Future Appointments   Date Time Provider [address]   7/12/2023 10:15  [facility]   8/23/2023  9:15 AM [facility]   8/23/2023 10:40 AM Maria Fernanda Ruiz,  [facility]        Subjective:     Tresa Bains is a 49 y.o. male who presents today for a follow-up on his chronic medical conditions. HPI:  Chief Complaint   Patient presents with   • Follow-up     Return in about 6 weeks (around 7/3/2023) for F/U HTN, HL, Anx/Dep, LBP, ED, Labs. -- Above per clinical staff and reviewed. --      HPI      Today:    Return in about 6 weeks (around 7/3/2023) for F/U HTN, HL, Anx/Dep, LBP, ED, Labs. Return in 6 weeks (on 5/23/2023) for F/U HTN, HL, Anx/Dep, LBP, ED, Labs. Patient did not complete labs 5/22/23.         Overweight - s/p Exercise Nutritionist Lilly Mcdaniel consult 5/3/23 - next appt 8/23.  Watching diet.  He cut back on drinking soda and fast food.  Counting calories with lose it sheri. Drinking 0-2 small sodas daily. +Exercise - Weightlifting for 1 hour, 2 times per week. He would like to resume running.     HTN - On Losartan 100mg QD.  No other HTN meds previously.  No BP check outside of office.       Hyperlipidemia - No statin previously.     H/O Grave's Disease / Hyperthyroidism - s/p Tapazole x 2 years, then radioactive iodine, which resulted in euthyroid status.       Anxiety / Depression - On increased Effexor XR 150mg QD x 6 weeks. D/C Wellbutrin XL 300mg QD in AM and Cymbalta 90mg QD.  Mood is unchanged and he feels flat.   Mood is "not great' - since summer 2022 - increased stress at work and in marriage. [facility] feels "life is heavy," mind races, not sleeping well, is unmotivated. [facility] feels Wellbutrin is not working, but cannot specifically state why.  Previous Management per Psych at [address] - last seen 2022  - F/U PRN as was D/C to PCP Management.  On Wellbutrin XL 300mg QD in AM since .  Previously on Zoloft, Prozac - did fine with these meds, Lexapro 20mg QD ineffective.  No other mood meds or higher dose previously.  Sees counseling q2 weeks - marriage counseling with wife, and individual - helpful.  Next appt .  Poor social supports.  No SI/HI/AH/VH.         PHQ-2/9 Depression Screening    Little interest or pleasure in doing things: 1 - several days  Feeling down, depressed, or hopeless: 1 - several days  Trouble falling or staying asleep, or sleeping too much: 1 - several days  Feeling tired or having little energy: 1 - several days  Poor appetite or overeatin - not at all  Feeling bad about yourself - or that you are a failure or have let yourself or your family down: 1 - several days  Trouble concentrating on things, such as reading the newspaper or watching television: 0 - not at all  Moving or speaking so slowly that other people could have noticed. Or the opposite - being so fidgety or restless that you have been moving around a lot more than usual: 0 - not at all  Thoughts that you would be better off dead, or of hurting yourself in some way: 0 - not at all  PHQ-9 Score: 5   PHQ-9 Interpretation: Mild depression        CORA-7 Flowsheet Screening    Flowsheet Row Most Recent Value   Over the last 2 weeks, how often have you been bothered by any of the following problems? Feeling nervous, anxious, or on edge 0   Not being able to stop or control worrying 1   Worrying too much about different things 1   Trouble relaxing 1   Being so restless that it is hard to sit still 0   Becoming easily annoyed or irritable 1   Feeling afraid as if something awful might happen 0   CORA-7 Total Score 4           ED - Management per [facility] Dr. Alicia Rosado. Next appt  s/p hormonal labs.   On Cialis 2.5mg QD - helpful.  Patient feels due to Lexapro and Wellbutrin.  D/C Viagra 50mg QD PRN - helpful, but desired daily Rx.  No other ED meds or higher doses previously.     Chronic Lower Back Pain / Lumbar Spondylosis / Myofascial Pain Syndrome of Lumbar Spine / Bertolotti's Syndrome / Coccydynia  - Management per pain Management Dr. Travis Carrillo. Next appt 7/23, s/p 6/5/23 for [facility] s benefit. Management per [facility] - next appt PRN. On Effexor XR 150mg QD x 6 weeks.  No change in back pain.  Has daily spasms on medial sacrum, radiates into B/L buttocks.  Injured his back in 2015 during  training in [address] is using Flexeril 10mg 1/2 -1 daily PRN - needing to use 1/2 pill daily as spasms are worsening, taking 2-3 times per week when not working.   Previously seen [facility] 2021.  S/p [facility]C s benefit.  Last MRI in [place] in [id number].  Surgery was not advised per advised.  Last PT in [place] 2018 s benefit. He is attending PT 2 days per week and doing HEP, but causes pain.         Vitamin D Deficiency - No Drisdol.  Taking MVI and OTC Vitamin D 5,000IU daily.       Low Testosterone - Management per [facility] Dr. Vicente Mcclellan. Next appt 8/23 s/p hormonal labs.     H/O False Positive HIV test - s/p [facility] e-consult per Dr. Drew Bowen 10/7/22, who advised ID F/U vs. PCP repeat the testing at three months, six months and one year, and include HIV PCR testing with the routine screening if it does not already reflex that way.      Normal colonoscopy 11/12/20 by GI Dr. Tello Haro @ [facility].  Repeat in 10 years.               From previous note:    Return in 6 weeks (on 5/23/2023) for F/U HTN, HL, Anx/Dep, LBP, ED, Labs.     PTO c Meagan        Overweight - Pending Exercise Nutritionist - Pending 5/22/23.  Watching diet.  He cut back on drinking soda and fast food.  +Exercise - Weightlifting for 1 hour, 2 times per week. He would like to resume running.     HTN - On Losartan 100mg QD.  No other HTN meds previously.  No BP check outside of office.     Hyperlipidemia - No statin previously.     H/O Grave's Disease / Hyperthyroidism - s/p Tapazole x 2 years, then radioactive iodine, which resulted in euthyroid status.       Anxiety / Depression - On Effexor XR 75mg QD x 6 weeks. D/C Wellbutrin XL 300mg QD in AM and Cymbalta 90mg QD x 6 weeks as ineffective.  Mood is unchanged and is "not great' - since summer 2022 - increased stress at work and in marriage. [facility] feels "life is heavy," mind races, not sleeping well, is unmotivated. [facility] feels Wellbutrin is not working, but cannot specifically state why.  Previous Management per Psych at [address] - last seen   - F/U PRN as was D/C to PCP Management.  On Wellbutrin XL 300mg QD in AM since .  Previously on Zoloft, Prozac - did fine with these meds, Lexapro 20mg QD ineffective.  No other mood meds or higher dose previously. Janneth Mcintosh counseling q2 weeks - marriage counseling with wife, and individual - helpful.  Next appt .  Poor social supports.  No SI/HI/AH/VH.       PHQ-2/9 Depression Screening       Little interest or pleasure in doing things: 1 - several days  Feeling down, depressed, or hopeless: 2 - more than half the days  Trouble falling or staying asleep, or sleeping too much: 2 - more than half the days  Feeling tired or having little energy: 2 - more than half the days  Poor appetite or overeatin - several days  Feeling bad about yourself - or that you are a failure or have let yourself or your family down: 1 - several days  Trouble concentrating on things, such as reading the newspaper or watching television: 0 - not at all  Moving or speaking so slowly that other people could have noticed.  Or the opposite - being so fidgety or restless that you have been moving around a lot more than usual: 0 - not at all  Thoughts that you would be better off dead, or of hurting yourself in some way: 0 - not at all  PHQ-9 Score: 9   PHQ-9 Interpretation: Mild depression                 CORA-7 Flowsheet Screening    Flowsheet Row Most Recent Value   Over the last 2 weeks, how often have you been bothered by any of the following problems?     Feeling nervous, anxious, or on edge 1   Not being able to stop or control worrying 1   Worrying too much about different things 1   Trouble relaxing 1   Being so restless that it is hard to sit still 0   Becoming easily annoyed or irritable 1   Feeling afraid as if something awful might happen 0   CORA-7 Total Score 5             ED - Management per [facility] Dr. Shannan Callejas. Next appt 6/23 s/p hormonal labs. On Cialis 2.5mg QD - helpful.  Patient feels due to Lexapro and Wellbutrin.  D/C Viagra 50mg QD PRN - helpful, but desired daily Rx.  No other ED meds or higher doses previously.     Chronic Lower Back Pain / Lumbar Spondylosis / Myofascial Pain Syndrome of Lumbar Spine / Bertolotti's Syndrome / Coccydynia  - Management per pain Management Dr. Isabel Ba. Next appt 6/5/23 for [facility]. Management per Zoran Bertrand - next appt PRN. On Effexor XR 75mg QD x 6 weeks.  No change in back pain.  Has daily spasms on medial sacrum, radiates into B/L buttocks.  Injured his back in 2015 during  training in [address] [place] [address] is using Flexeril 10mg 1/2 -1 daily PRN - needing to use 1/2 pill daily as spasms are worsening, previously taking 3-4 times per week.   Previously seen [facility] 2021.  S/p [facility] s benefit.  Last MRI in [place] in [id number].  Surgery was not advised per advised.  Last PT in [place] 2018 s benefit. He is attending PT 2 days per week and doing HEP, but causes pain.         Vitamin D Deficiency - No Drisdol.  Taking MVI and OTC Vitamin D 5,000IU daily.       Low Testosterone - Management per [facility] Dr. Shannan Callejas. Next appt 6/23 s/p hormonal labs.     H/O False Positive HIV test - s/p [facility] e-consult per Dr. Boo Arreola 10/7/22, who advised ID F/U vs. PCP repeat the testing at three months, six months and one year, and include HIV PCR testing with the routine screening if it does not already reflex that way.      Normal colonoscopy 11/12/20 by GI Dr. Chin Hammonds @ [facility].   Repeat in 10 years.                 From previous note:        Return in 6 weeks (on 2023) for F/U HTN, HL, Anx/Dep, LBP, ED, Labs.        Overweight - Would like to see Nutritionist - Pending 5/3/23.  Watching diet.  He cut back on drinking soda and fast food.  +Exercise - [place] [employer] for 1 hour, 2-3 times per week.       HTN - On Losartan 100mg QD.  No other HTN meds previously.  No BP check outside of office.        Hyperlipidemia - No statin previously.     H/O Grave's Disease / Hyperthyroidism - s/p Tapazole x 2 years, then radioactive iodine, which resulted in euthyroid status.       Anxiety / Depression - On increased Cymbalta 90mg QD x 6 weeks.  Mood is unchanged and is "not great' - since summer 2022 - increased stress at work and in marriage. [facility] feels "life is heavy," mind races, not sleeping well, is unmotivated. [facility] feels Wellbutrin is not working, but cannot specifically state why.  Previous Management per Psych at [address] - last seen   - F/U PRN as was D/C to PCP Management.  On Wellbutrin XL 300mg QD in AM since .  Previously on Zoloft, Prozac - did fine with these meds, Lexapro 20mg QD ineffective.  No other mood meds or higher dose previously. [facility] counseling q2 weeks - marriage counseling with wife, and individual - helpful.  Next appt .  Poor social supports.  No SI/HI/AH/VH.       PHQ-2/9 Depression Screening       Little interest or pleasure in doing things: 1 - several days  Feeling down, depressed, or hopeless: 1 - several days  Trouble falling or staying asleep, or sleeping too much: 2 - more than half the days  Feeling tired or having little energy: 2 - more than half the days  Poor appetite or overeatin - not at all  Feeling bad about yourself - or that you are a failure or have let yourself or your family down: 1 - several days  Trouble concentrating on things, such as reading the newspaper or watching television: 0 - not at all  Moving or speaking so slowly that other people could have noticed.  Or the opposite - being so fidgety or restless that you have been moving around a lot more than usual: 0 - not at all  Thoughts that you would be better off dead, or of hurting yourself in some way: 0 - not at all  PHQ-9 Score: 7   PHQ-9 Interpretation: Mild depression                      CORA-7 Flowsheet Screening    Flowsheet Row Most Recent Value   Over the last 2 weeks, how often have you been bothered by any of the following problems?     Feeling nervous, anxious, or on edge 1   Not being able to stop or control worrying 1   Worrying too much about different things 1   Trouble relaxing 1   Being so restless that it is hard to sit still 0   Becoming easily annoyed or irritable 1   Feeling afraid as if something awful might happen 0   CORA-7 Total Score 5                ED - On Cialis 2.5mg QD - helpful.  Patient feels due to Lexapro and Wellbutrin.  D/C Viagra 50mg QD PRN - helpful, but desired daily Rx.  No other ED meds or higher doses previously.     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  No change in back pain.  Has daily spasms on medial sacrum, radiates into B/L buttocks.  Injured his back in 2015 during  training in [address] [place] [address] is using Flexeril 10mg 1/2 -1 daily PRN - needing to use 1/2 pill daily as spasms are worsening, previously taking 3-4 times per week.   Previously seen [facility].  S/p [facility] s benefit.  Last MRI in [place] in [id number].  Surgery was not advised per advised.  Last PT in [place] 2018 s benefit.       Vitamin D Deficiency - No Drisdol.  Taking MVI and OTC Vitamin D 5,000IU daily.       Low Testosterone - Pending [facility] consult Dr. Brian Guillermo 5/23.     H/O False Positive HIV test - s/p [facility] e-consult per Dr. Catie Galicia 10/7/22, who advised ID F/U vs. PCP repeat the testing at three months, six months and one year, and include HIV PCR testing with the routine screening if it does not already reflex that way.      Normal colonoscopy 20 by GRAYSON Shaw @ [facility].  Repeat in 10 years.                From previous note:     Return in about 6 weeks (around 2023) for F/U HTN, HL, Anx/Dep, ED, LBP, Labs.     Patient did not complete labs 2023.     Overweight - Would like to see Nutritionist - Pending 5/3/23.  Trying to watch diet.  He cut back on drinking soda, but eats fast food.  +Exercise - [place] [employer] for 1 hour, 2-3 times per week.       HTN - On Losartan 100mg QD.  No other HTN meds previously.  No BP check outside of office.        Hyperlipidemia - No statin previously.     H/O  Grave's Disease / Hyperthyroidism - s/p Tapazole x 2 years, then radioactive iodine, which resulted in euthyroid status.       Anxiety / Depression - On Cymbalta 60mg QD x 6 weeks.  Mood is unchanged and is "not great' - since summer 2022 - increased stress at work and in marriage.  Previous Management per Psych at [address] - last seen   - F/U PRN as was D/C to PCP Management.  On Wellbutrin XL 300mg QD in AM since .  Previously on Zoloft, Prozac - did fine with these meds, Lexapro 20mg QD ineffective.  No other mood meds or higher dose previously. Samira Mcintyre counseling q2 weeks - marriage counseling with wife, and individual - helpful.  Next appt .  Poor social supports.  No SI/HI/AH/VH.          PHQ-2/9 Depression Screening       Little interest or pleasure in doing things: 1 - several days  Feeling down, depressed, or hopeless: 1 - several days  Trouble falling or staying asleep, or sleeping too much: 1 - several days  Feeling tired or having little energy: 2 - more than half the days  Poor appetite or overeatin - several days  Feeling bad about yourself - or that you are a failure or have let yourself or your family down: 1 - several days  Trouble concentrating on things, such as reading the newspaper or watching television: 0 - not at all  Moving or speaking so slowly that other people could have noticed.  Or the opposite - being so fidgety or restless that you have been moving around a lot more than usual: 0 - not at all  Thoughts that you would be better off dead, or of hurting yourself in some way: 0 - not at all  PHQ-9 Score: 7   PHQ-9 Interpretation: Mild depression                      CORA-7 Flowsheet Screening    Flowsheet Row Most Recent Value   Over the last 2 weeks, how often have you been bothered by any of the following problems?     Feeling nervous, anxious, or on edge 0   Not being able to stop or control worrying 0   Worrying too much about different things 0   Trouble relaxing 1   Being so restless that it is hard to sit still 0   Becoming easily annoyed or irritable 0   Feeling afraid as if something awful might happen 0   CORA-7 Total Score 1             ED - On Cialis 2.5mg QD x 6 weeks - helpful.  Patient feels due to Lexapro and Wellbutrin.  D/C Viagra 50mg QD PRN - helpful, but desired daily Rx.  Patient is interested in daily Eura Yakelin is unsure if Rx is covered by insurance.  No other ED meds or higher doses previously.     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  No change in back pain.  Has daily spasms on medial sacrum, radiates into B/L buttocks.  Injured his back in 2015 during  training in [address] [place] [address] is using Flexeril 10mg 1/2 -1 daily PRN - taking 3-4 times per week.   Previously seen [facility].  S/p [facility] s benefit.  Last MRI in [place] in [id number].  Surgery was not advised per advised.  Last PT in [place] 2018 s benefit.       Vitamin D Deficiency - No Drisdol.  Taking MVI, but taking OTC Vitamin D 5,000IU daily.       H/O False Positive HIV test - s/p [facility] e-consult per Dr. Jack Anderson 10/7/22, who advised ID F/U vs. PCP repeat the testing at three months, six months and one year, and include HIV PCR testing with the routine screening if it does not already reflex that way.      Normal colonoscopy 20 by GRAYSON Muñoz @ [facility].  Repeat in 10 years.          From previous note:     Controlled Substance Review     [place] PDMP or [place]  reviewed: No red flags were identified; safe to proceed with prescription.      Hair Loss - Wants to know if Nutrafol is safe.  He had not tried Rogaine yet.       Overweight - Would like to see Nutritionist.  Not watching diet.  Drinks soda, eats fast food.  +Exercise - [place] [employer] for 1 hours 2-3 times per week.       HTN - On Losartan 100mg QD.  No other HTN meds previously.  No BP check outside of office.        Hyperlipidemia - No statin previously.     H/O  Grave's Disease / Hyperthyroidism - s/p Tapazole x 2 years, then radioactive iodine, which resulted in euthyroid status.       Anxiety / Depression - Mood is "not great' - since summer 2022 - increased stress at work and in marriage.  Previous Management per Psych at [address] - last seen   - F/U PRN as was D/C to PCP Management.  On Lexapro 20mg QD since  and Wellbutrin XL 300mg QD in AM since .  Previously on Zoloft, Prozac - did fine with these meds.  No other mood meds or higher dose previously. Cal [facility]ole counseling q2 weeks.  Next appt .        PHQ-2/9 Depression Screening       Little interest or pleasure in doing things: 2 - more than half the days  Feeling down, depressed, or hopeless: 2 - more than half the days  Trouble falling or staying asleep, or sleeping too much: 2 - more than half the days  Feeling tired or having little energy: 2 - more than half the days  Poor appetite or overeatin - not at all  Feeling bad about yourself - or that you are a failure or have let yourself or your family down: 1 - several days  Trouble concentrating on things, such as reading the newspaper or watching television: 0 - not at all  Moving or speaking so slowly that other people could have noticed.  Or the opposite - being so fidgety or restless that you have been moving around a lot more than usual: 0 - not at all  Thoughts that you would be better off dead, or of hurting yourself in some way: 0 - not at all  PHQ-2 Score: 4  PHQ-2 Interpretation: POSITIVE depression screen  PHQ-9 Score: 9   PHQ-9 Interpretation: Mild depression                      CORA-7 Flowsheet Screening    Flowsheet Row Most Recent Value   Over the last 2 weeks, how often have you been bothered by any of the following problems?     Feeling nervous, anxious, or on edge 1   Not being able to stop or control worrying 2   Worrying too much about different things 2   Trouble relaxing 2   Being so restless that it is hard to sit still 0   Becoming easily annoyed or irritable 1   Feeling afraid as if something awful might happen 0   CORA-7 Total Score 8          MDQ:  0, Asynchronous, No Problem     ED - Patient feels due to Lexapro and Wellbutrin.  On Viagra 50mg QD PRN - helpful.  Patient is interested in daily Patel Balling is unsure if Rx is covered by insurance. No other ED meds or higher doses previously.     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  Injured his back in 2015 during  training in [address] [place] [address] is using Flexeril 10mg 1/2 -1 daily PRN - taking 3-4 times per week.   Previously seen [facility].  S/p [facility] s benefit.  Last MRI in [place] in [id number].  Surgery was not advised per advised.  Last PT in [place] 2018 s benefit.       Vitamin D Deficiency - No Drisdol.  Not taking MVI, but taking OTC Vitamin D 5,000IU daily.       H/O False Positive HIV test - s/p [facility] e-consult per Dr. Chiqui Saucedo 10/7/22, who advised ID F/U vs. PCP repeat the testing at three months, six months and one year, and include HIV PCR testing with the routine screening if it does not already reflex that way.      Normal colonoscopy 11/12/20 by GI Dr. Judy Stewart @ [facility].   Repeat in 10 years.       Reviewed:  Labs 5/18/23, Pain Management 5/18/23, Nutrition 5/3/23, Uro 5/9/23, Ortho 5/3/23     Sees Dentist q6 months. Sees Optho q2 years. The following portions of the patient's history were reviewed and updated as appropriate: allergies, current medications, past family history, past medical history, past social history, past surgical history and problem list.      Review of Systems   Constitutional: Negative for appetite change, chills, diaphoresis, fatigue and fever. Respiratory: Negative for chest tightness and shortness of breath. Cardiovascular: Negative for chest pain. Gastrointestinal: Negative for abdominal pain, blood in stool, diarrhea, nausea and vomiting. Genitourinary: Negative for dysuria. Musculoskeletal: Positive for back pain. Current Outpatient Medications   Medication Sig Dispense Refill   • Cholecalciferol (Vitamin D3) 125 MCG (5000 UT) TABS Take 5,000 Units by mouth daily     • [employer] FIBER GUMMIES PO Take 2 tablets by mouth in the morning     • cyclobenzaprine (FLEXERIL) 10 mg tablet Take 1 tablet (10 mg total) by mouth daily as needed for muscle spasms 30 tablet 0   • losartan (COZAAR) 100 MG tablet Take 1 tablet (100 mg total) by mouth daily 90 tablet 2   • meloxicam (MOBIC) 15 mg tablet Take 1 tablet (15 mg total) by mouth daily 30 tablet 0   • Multiple Vitamin (multivitamin) tablet Take 1 tablet by mouth daily     • tadalafil (CIALIS) 2.5 MG tablet Take 1 tablet (2.5 mg total) by mouth daily 90 tablet 2   • venlafaxine (EFFEXOR-XR) 150 mg 24 hr capsule Take with 75mg pill for a total of 225mg daily. 30 capsule 1   • venlafaxine (EFFEXOR-XR) 75 mg 24 hr capsule Take with 150mg pill for a total of 225mg daily. 30 capsule 1   • clomiPHENE (CLOMID) 50 mg tablet Take 0.5 tablets (25 mg total) by mouth daily (Patient not taking: Reported on 7/3/2023) 15 tablet 3     No current facility-administered medications for this visit.        Objective:  /80   Pulse 80   Temp (!) 96.9 °F (36.1 °C) Ht 5' 10.5" (1.791 m)   Wt 82.9 kg (182 lb 12.8 oz)   SpO2 97%   BMI 25.86 kg/m²    Wt Readings from Last 3 Encounters:   07/03/23 82.9 kg (182 lb 12.8 oz)   05/22/23 85.3 kg (188 lb)   05/18/23 85.7 kg (189 lb)      BP Readings from Last 3 Encounters:   07/03/23 132/80   06/06/23 134/80   05/22/23 130/78          Physical Exam  Vitals and nursing note reviewed. Constitutional:       Appearance: Normal appearance. He is well-developed. HENT:      Head: Normocephalic and atraumatic. Eyes:      Conjunctiva/sclera: Conjunctivae normal.   Neck:      Thyroid: No thyromegaly. Cardiovascular:      Rate and Rhythm: Normal rate and regular rhythm. Pulses: Normal pulses. Heart sounds: Normal heart sounds. Pulmonary:      Effort: Pulmonary effort is normal.      Breath sounds: Normal breath sounds. Musculoskeletal:         General: No swelling or tenderness. Cervical back: Neck supple. Right lower leg: No edema. Left lower leg: No edema. Lymphadenopathy:      Cervical: No cervical adenopathy. Neurological:      General: No focal deficit present. Mental Status: He is alert and oriented to person, place, and time. Mental status is at baseline. Psychiatric:         Attention and Perception: Attention and perception normal.         Mood and Affect: Mood is depressed. Affect is flat. Speech: Speech normal.         Behavior: Behavior normal. Behavior is cooperative. Thought Content:  Thought content normal.         Cognition and Memory: Cognition and memory normal.         Judgment: Judgment normal.         Lab Results:      Lab Results   Component Value Date    WBC 6.32 02/09/2023    HGB 16.6 02/09/2023    HCT 52.2 (H) 02/09/2023     02/09/2023    TRIG 207 (H) 02/09/2023    HDL 37 (L) 02/09/2023    LDLDIRECT 116 (H) 02/09/2023    ALT 29 05/18/2023    AST 16 05/18/2023    K 4.7 05/18/2023     05/18/2023    CREATININE 1.07 05/18/2023    BUN 19 05/18/2023 CO2 28 05/18/2023    PSA 0.64 05/26/2023    GLUF 100 (H) 05/18/2023    HGBA1C 5.5 02/09/2023     No results found for: "URICACID"  Invalid input(s): "BASENAME" Vitamin D    FL spine and pain procedure    Result Date: 6/6/2023  Narrative: Indication:  Tailbone pain Preoperative Diagnosis: 1. Coccydynia Postoperative Diagnosis: 1. Coccydynia  Procedure: Fluoroscopically-guided coccygeal joint injection  EBL:  none Specimens:  not applicable  After discussing the risks, benefits, and alternatives to the procedure, the patient expressed understanding and wished to proceed. The patient was brought to the fluoroscopy suite and placed in the prone position. Procedural pause conducted to verify:  correct patient identity, procedure to be performed and as applicable, correct side and site, correct patient position, and availability of implants, special equipment and special requirements. Using fluoroscopy, the inferior portion of the sacroiliac joint was identified. The skin was sterilely prepped and draped in the usual fashion using Chloraprep skin prep. The skin and subcutaneous tissue were anesthetized with 0.5% lidocaine. Using fluoroscopic guidance, a 1.5 inch 25 gauge spinal needle was advanced into joint. Proper needle positioning was confirmed using multiple fluoroscopic views. After negative aspiration, 0.5 cc of [Omnipaque 300] contrast was injected showing periarticular spread. Next, a 2 mL solution consisting of 40 mg of Depo-Medrol in 0.25% bupivacaine was injected slowly and incrementally into and around the joint. Following the injection, the needle was withdrawn slightly and flushed with lidocaine as it was fully extracted. The patient tolerated the procedure well and there were no apparent complications. After appropriate observation, the patient was dismissed from the clinic in good condition under their own power.        POCT Labs

## 2023-07-12 ENCOUNTER — OFFICE VISIT (OUTPATIENT)
Dept: PAIN MEDICINE | Facility: MEDICAL CENTER | Age: 48
End: 2023-07-12
Payer: COMMERCIAL

## 2023-07-12 VITALS
BODY MASS INDEX: 26.48 KG/M2 | HEIGHT: 70 IN | SYSTOLIC BLOOD PRESSURE: 129 MMHG | WEIGHT: 185 LBS | HEART RATE: 76 BPM | DIASTOLIC BLOOD PRESSURE: 82 MMHG

## 2023-07-12 DIAGNOSIS — M54.41 CHRONIC BILATERAL LOW BACK PAIN WITH BILATERAL SCIATICA: Primary | ICD-10-CM

## 2023-07-12 DIAGNOSIS — M54.42 CHRONIC BILATERAL LOW BACK PAIN WITH BILATERAL SCIATICA: Primary | ICD-10-CM

## 2023-07-12 DIAGNOSIS — G89.29 CHRONIC BILATERAL LOW BACK PAIN WITH BILATERAL SCIATICA: Primary | ICD-10-CM

## 2023-07-12 DIAGNOSIS — M46.1 SACROILIITIS (HCC): ICD-10-CM

## 2023-07-12 DIAGNOSIS — M53.3 COCCYDYNIA: ICD-10-CM

## 2023-07-12 PROCEDURE — 99214 OFFICE O/P EST MOD 30 MIN: CPT | Performed by: PHYSICIAN ASSISTANT

## 2023-07-12 NOTE — PROGRESS NOTES
Assessment:  1. Chronic bilateral low back pain with bilateral sciatica    2. Coccydynia    3. Sacroiliitis ([address])        Plan:  While the patient was in the office today, I did have a thorough conversation regarding their chronic pain syndrome, medication management, and treatment plan options. Unfortunately the patient did not sustain any improvement following the sacrococcygeal injection. He has recently attended physical therapy with no relief either. He did not proceed with the previously ordered CT guided sacroiliac joint injection. I have advised him that we could consider bilateral sacroiliac joint injections for diagnostic and hopefully therapeutic purposes however first we will obtain updated imaging. I have placed an order for an MRI of the lumbar spine on today's visit. Follow-up after the MRI to determine the next step in his treatment plan. My impressions and treatment recommendations were discussed in detail with the patient who verbalized understanding and had no further questions. Discharge instructions were provided. I personally saw and examined the patient and I agree with the above discussed plan of care. Orders Placed This Encounter   Procedures   • MRI lumbar spine without contrast     Standing Status:   Future     Standing Expiration Date:   7/12/2027     Scheduling Instructions: There is no preparation for this test. Please leave your jewelry and valuables at home, wedding rings are the exception. All patients will be required to change into a hospital gown and pants. Street clothes are not permitted in the MRI. Magnetic nail polish must be removed prior to arrival for your test. Please bring your insurance cards, a form of photo ID and a list of your medications with you. Arrive 15 minutes prior to your appointment time in order to register. Please bring any prior CT or MRI studies of this area that were not performed at a [facility].             To schedule this appointment, please contact Central Scheduling at [phone]. Prior to your appointment, please make sure you complete the MRI Screening Form when you e-Check in for your appointment. This will be available starting 7 days before your appointment in [address] [place] [address]. You may receive an e-mail with an activation code if you do not have a [facility] account. If you do not have access to a device, we will complete your screening at your appointment. Order Specific Question:   What is the patient's sedation requirement? If Medication for Claustrophobia is selected, order medication at this point. Answer:   No Sedation     Order Specific Question:   Does this procedure require the 3T MRI at [facility] or [place]?     Answer:   No     Order Specific Question:   Release to patient through [facility]     Answer:   Immediate     Order Specific Question:   Is order priority selected as STAT? Answer:   No     Order Specific Question:   Reason for Exam (FREE TEXT)     Answer:   chronic low back pain, no improvement with physical therapy     No orders of the defined types were placed in this encounter. History of Present Illness:  Vannesa Osullivan is a 49 y.o. male who presents for a follow up office visit in regards to chronic low back pain. The patient’s current symptoms include chronic back and tailbone pain that he presently rates a 6 out of 10 on the pain scale and describes it as an intermittent dull, aching and sharp pain that radiates into the buttocks bilaterally. He underwent a sacrococcygeal injection and unfortunately is unable to report any improvement. Patient has recently attended physical therapy and performs home exercises with no pain relief. This is a chronic issue since an injury at work in 2015 and he previously had no improvement to different interventional procedures including epidural steroid injections and radiofrequency ablation.     I have personally reviewed and/or updated the patient's past medical history, past surgical history, family history, social history, current medications, allergies, and vital signs today. Review of Systems   Respiratory: Negative for shortness of breath. Cardiovascular: Negative for chest pain. Gastrointestinal: Negative for constipation, diarrhea, nausea and vomiting. Musculoskeletal: Positive for back pain. Negative for arthralgias, gait problem, joint swelling and myalgias. Skin: Negative for rash. Neurological: Negative for dizziness, seizures and weakness. All other systems reviewed and are negative.       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   • Depression    • ED (erectile dysfunction)    • History of Graves' disease     s/p Tapazole x 2 years, then radioactive iodine, which resulted in euthyroid status   • Hyperlipidemia    • Hypertension    • Low testosterone 5/22/2023   • Lumbar spondylosis 11/16/2020   • Myofascial pain syndrome of lumbar spine 12/14/2020   • Overweight    • Vitamin D deficiency        Past Surgical History:   Procedure Laterality Date   • NO PAST SURGERIES         Family History   Problem Relation Age of Onset   • Hypertension Mother    • Diabetes type II Mother    • Stomach cancer Mother 48   • Depression Mother    • Hyperlipidemia Mother    • Mental illness Mother    • Depression Father    • Mental retardation Brother    • No Known Problems Daughter        Social History     Occupational History   • Occupation: Paramedic with [employer]   Tobacco Use   • Smoking status: Former     Packs/day: 1.50     Years: 19.00     Total pack years: 28.50     Types: Cigarettes     Start date: 1992     Quit date: 2011     Years since quittin.5   • Smokeless tobacco: Never   Vaping Use   • Vaping Use: Never used   Substance and Sexual Activity   • Alcohol use: Yes     Alcohol/week: 3.0 standard drinks of alcohol     Types: 3 Standard drinks or equivalent per week     Comment: occasionally, socially   • Drug use: Not Currently     Types: Marijuana     Comment: Last use 20yo   • Sexual activity: Yes     Partners: Female     Birth control/protection: None       Current Outpatient Medications on File Prior to Visit   Medication Sig   • Cholecalciferol (Vitamin D3) 125 MCG (5000 UT) TABS Take 5,000 Units by mouth daily   • [employer] FIBER GUMMIES PO Take 2 tablets by mouth in the morning   • cyclobenzaprine (FLEXERIL) 10 mg tablet Take 1 tablet (10 mg total) by mouth daily as needed for muscle spasms   • losartan (COZAAR) 100 MG tablet Take 1 tablet (100 mg total) by mouth daily   • Multiple Vitamin (multivitamin) tablet Take 1 tablet by mouth daily   • tadalafil (CIALIS) 2.5 MG tablet Take 1 tablet (2.5 mg total) by mouth daily   • venlafaxine (EFFEXOR-XR) 150 mg 24 hr capsule Take with 75mg pill for a total of 225mg daily. • venlafaxine (EFFEXOR-XR) 75 mg 24 hr capsule Take with 150mg pill for a total of 225mg daily. • clomiPHENE (CLOMID) 50 mg tablet Take 0.5 tablets (25 mg total) by mouth daily (Patient not taking: Reported on 7/3/2023)   • meloxicam (MOBIC) 15 mg tablet Take 1 tablet (15 mg total) by mouth daily (Patient not taking: Reported on 2023)     No current facility-administered medications on file prior to visit. No Known Allergies    Physical Exam:    /82   Pulse 76   Ht 5' 10" (1.778 m)   Wt 83.9 kg (185 lb)   BMI 26.54 kg/m²     Constitutional:normal, well developed, well nourished, alert, in no distress and non-toxic and no overt pain behavior.   Eyes:anicteric  HEENT:grossly intact  Neck:supple, symmetric, trachea midline and no masses   Pulmonary:even and unlabored  Cardiovascular:No edema or pitting edema present  Skin:Normal without rashes or lesions and well hydrated  Psychiatric:Mood and affect appropriate  Neurologic:Cranial Nerves II-XII grossly intact  Musculoskeletal: tender bilateral sacroiliiac joints, full range of motion of lumbar spine, normal gait.      Imaging

## 2023-07-17 ENCOUNTER — PATIENT MESSAGE (OUTPATIENT)
Dept: FAMILY MEDICINE CLINIC | Facility: CLINIC | Age: 48
End: 2023-07-17

## 2023-07-18 ENCOUNTER — LAB (OUTPATIENT)
Dept: LAB | Facility: CLINIC | Age: 48
End: 2023-07-18
Payer: COMMERCIAL

## 2023-07-18 DIAGNOSIS — F32.0 CURRENT MILD EPISODE OF MAJOR DEPRESSIVE DISORDER, UNSPECIFIED WHETHER RECURRENT (HCC): ICD-10-CM

## 2023-07-18 DIAGNOSIS — F41.9 ANXIETY: ICD-10-CM

## 2023-07-18 LAB
ALBUMIN SERPL BCP-MCNC: 4.1 G/DL (ref 3.5–5)
ALP SERPL-CCNC: 75 U/L (ref 46–116)
ALT SERPL W P-5'-P-CCNC: 26 U/L (ref 12–78)
ANION GAP SERPL CALCULATED.3IONS-SCNC: 3 MMOL/L
AST SERPL W P-5'-P-CCNC: 17 U/L (ref 5–45)
BILIRUB SERPL-MCNC: 0.57 MG/DL (ref 0.2–1)
BUN SERPL-MCNC: 23 MG/DL (ref 5–25)
CALCIUM SERPL-MCNC: 9.7 MG/DL (ref 8.3–10.1)
CHLORIDE SERPL-SCNC: 109 MMOL/L (ref 96–108)
CO2 SERPL-SCNC: 27 MMOL/L (ref 21–32)
CREAT SERPL-MCNC: 1.13 MG/DL (ref 0.6–1.3)
GFR SERPL CREATININE-BSD FRML MDRD: 76 ML/MIN/1.73SQ M
GLUCOSE SERPL-MCNC: 83 MG/DL (ref 65–140)
POTASSIUM SERPL-SCNC: 4.5 MMOL/L (ref 3.5–5.3)
PROT SERPL-MCNC: 7.4 G/DL (ref 6.4–8.4)
SODIUM SERPL-SCNC: 139 MMOL/L (ref 135–147)

## 2023-07-18 PROCEDURE — 36415 COLL VENOUS BLD VENIPUNCTURE: CPT

## 2023-07-18 PROCEDURE — 80053 COMPREHEN METABOLIC PANEL: CPT

## 2023-07-19 ENCOUNTER — TELEPHONE (OUTPATIENT)
Dept: FAMILY MEDICINE CLINIC | Facility: CLINIC | Age: 48
End: 2023-07-19

## 2023-07-19 NOTE — TELEPHONE ENCOUNTER
Patient canceled his 9/1/23 appointment with doctor Carlos Segura, on his My Chart says since he is not taking medication anymore he feels he does not need any further appointments.  I said if he needs us to please feel free to  Give us a call

## 2023-07-26 ENCOUNTER — HOSPITAL ENCOUNTER (OUTPATIENT)
Facility: MEDICAL CENTER | Age: 48
Discharge: HOME | End: 2023-07-26
Payer: COMMERCIAL

## 2023-07-26 ENCOUNTER — OFFICE VISIT (OUTPATIENT)
Dept: URGENT CARE | Facility: CLINIC | Age: 48
End: 2023-07-26
Payer: COMMERCIAL

## 2023-07-26 VITALS
HEART RATE: 82 BPM | HEIGHT: 70 IN | BODY MASS INDEX: 26.48 KG/M2 | WEIGHT: 185 LBS | TEMPERATURE: 96.2 F | OXYGEN SATURATION: 98 % | SYSTOLIC BLOOD PRESSURE: 129 MMHG | RESPIRATION RATE: 16 BRPM | DIASTOLIC BLOOD PRESSURE: 80 MMHG

## 2023-07-26 DIAGNOSIS — M54.41 CHRONIC BILATERAL LOW BACK PAIN WITH BILATERAL SCIATICA: ICD-10-CM

## 2023-07-26 DIAGNOSIS — G89.29 CHRONIC BILATERAL LOW BACK PAIN WITH BILATERAL SCIATICA: ICD-10-CM

## 2023-07-26 DIAGNOSIS — A09 GASTROENTERITIS, INFECTIOUS: Primary | ICD-10-CM

## 2023-07-26 DIAGNOSIS — M54.42 CHRONIC BILATERAL LOW BACK PAIN WITH BILATERAL SCIATICA: ICD-10-CM

## 2023-07-26 PROCEDURE — G1004 CDSM NDSC: HCPCS

## 2023-07-26 PROCEDURE — 72148 MRI LUMBAR SPINE W/O DYE: CPT

## 2023-07-26 PROCEDURE — 99213 OFFICE O/P EST LOW 20 MIN: CPT | Performed by: PHYSICIAN ASSISTANT

## 2023-07-26 RX ORDER — ONDANSETRON 4 MG/1
4 TABLET, ORALLY DISINTEGRATING ORAL EVERY 6 HOURS PRN
Qty: 20 TABLET | Refills: 0 | Status: SHIPPED | OUTPATIENT
Start: 2023-07-26

## 2023-07-26 RX ORDER — ONDANSETRON 4 MG/1
4 TABLET, ORALLY DISINTEGRATING ORAL EVERY 6 HOURS PRN
Status: SHIPPED | OUTPATIENT
Start: 2023-07-26

## 2023-07-26 RX ADMIN — ONDANSETRON 4 MG: 4 TABLET, ORALLY DISINTEGRATING ORAL at 13:03

## 2023-07-26 NOTE — PATIENT INSTRUCTIONS
Take antinausea medicine as instructed. Get some over-the-counter Prilosec to take daily in place of Tums, Rolaids. 1. Clear liquids for 12-24 hours     2. Then may advance to bland diet (ie. BRAT - bananas, applesauce, toast) as tolerated. 3. Recommend avoiding any milk products, spicy, greasy foods and citrus products over the next 1-2 weeks. Advance diet as tolerated. 4. Transmission precautions advised. 5. If symptoms are not resolving over the next 2-3 days, seek further evaluation by your PCP. 6. If you symptoms worsen and you are unable to keep any food or liquid down or develop significant abdominal pain, proceed to ER for further evaluation and treatment. 7.  Transmission precautions advised. If potential viral or bacterial infection, may be transmitted to other people. Recommend good handwashing after using toilet and keeping any shared bathrooms / sinks / handles well cleaned.

## 2023-07-26 NOTE — PROGRESS NOTES
[facility]    NAME: Tete Johnson is a 49 y.o. male  : 1975    MRN: [id number]  DATE: 2023  TIME: 1:38 PM    Assessment and Plan   Gastroenteritis, infectious [A09]  1. Gastroenteritis, infectious  ondansetron (ZOFRAN-ODT) 4 mg disintegrating tablet    ondansetron (ZOFRAN-ODT) dispersible tablet 4 mg    ondansetron (ZOFRAN-ODT) 4 mg disintegrating tablet        Nurse administered Zofran in office. Patient called saying pharmacy did not receive prescription. We will send again. Patient Instructions   Patient Instructions   Take antinausea medicine as instructed. Get some over-the-counter Prilosec to take daily in place of Tums, Rolaids. 1. Clear liquids for 12-24 hours     2. Then may advance to bland diet (ie. BRAT - bananas, applesauce, toast) as tolerated. 3. Recommend avoiding any milk products, spicy, greasy foods and citrus products over the next 1-2 weeks. Advance diet as tolerated. 4. Transmission precautions advised. 5. If symptoms are not resolving over the next 2-3 days, seek further evaluation by your PCP. 6. If you symptoms worsen and you are unable to keep any food or liquid down or develop significant abdominal pain, proceed to ER for further evaluation and treatment. 7.  Transmission precautions advised. If potential viral or bacterial infection, may be transmitted to other people. Recommend good handwashing after using toilet and keeping any shared bathrooms / sinks / handles well cleaned. Chief Complaint     Chief Complaint   Patient presents with   • Abdominal Pain     Patient with abd discomfort and nausea since  along with some loose stools. Unable to eat due to nausea. Drinking Sprite, crackers and soup for food. History of Present Illness   Tete Johnson presents to the clinic c/o  76-year-old male comes in with complaint of stomach pain and heartburn.     Started:  morning woke up nauseated and had several bouts of diarrhea. Associated signs and symptoms: Fever or chills. Tired. Poor appetite with nausea and upset stomach. Some heartburn. No blood in stool. No difficulty with urination. Modifying factors: Milk of magnesia today, Pepto-Bismol, Rolaids and Tums all without relief. Denies any recent travel or known exposures. No unusual foods. Review of Systems   Review of Systems   Constitutional: Positive for activity change, appetite change and fatigue. Negative for chills, diaphoresis and fever. HENT: Negative. Respiratory: Negative. Cardiovascular: Negative. Gastrointestinal: Positive for abdominal pain, diarrhea and nausea. Negative for abdominal distention, anal bleeding, blood in stool, constipation, rectal pain and vomiting. Skin: Negative for rash. Neurological: Negative for dizziness and light-headedness. Current Medications     Long-Term Medications   Medication Sig Dispense Refill   • ondansetron (ZOFRAN-ODT) 4 mg disintegrating tablet Take 1 tablet (4 mg total) by mouth every 6 (six) hours as needed for nausea or vomiting 20 tablet 0   • ondansetron (ZOFRAN-ODT) 4 mg disintegrating tablet Take 1 tablet (4 mg total) by mouth every 6 (six) hours as needed for nausea or vomiting 20 tablet 0   • Cholecalciferol (Vitamin D3) 125 MCG (5000 UT) TABS Take 5,000 Units by mouth daily     • clomiPHENE (CLOMID) 50 mg tablet Take 0.5 tablets (25 mg total) by mouth daily (Patient not taking: Reported on 7/3/2023) 15 tablet 3   • cyclobenzaprine (FLEXERIL) 10 mg tablet Take 1 tablet (10 mg total) by mouth daily as needed for muscle spasms 30 tablet 0   • FLUoxetine (PROzac) 10 mg capsule 1 tab by mouth in AM daily x 1 week, then increase to 2 tabs by mouth daily in AM thereafter.  60 capsule 1   • losartan (COZAAR) 100 MG tablet Take 1 tablet (100 mg total) by mouth daily 90 tablet 2   • meloxicam (MOBIC) 15 mg tablet Take 1 tablet (15 mg total) by mouth daily (Patient not taking: Reported on 7/12/2023) 30 tablet 0   • Multiple Vitamin (multivitamin) tablet Take 1 tablet by mouth daily     • tadalafil (CIALIS) 2.5 MG tablet Take 1 tablet (2.5 mg total) by mouth daily 90 tablet 2       Current Allergies     Allergies as of 07/26/2023   • (No Known Allergies)          The following portions of the patient's history were reviewed and updated as appropriate: allergies, current medications, past family history, past medical history, past social history, past surgical history and problem list.  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   • Depression    • ED (erectile dysfunction)    • History of Graves' disease     s/p Tapazole x 2 years, then radioactive iodine, which resulted in euthyroid status   • Hyperlipidemia    • Hypertension    • Low testosterone 5/22/2023   • Lumbar spondylosis 11/16/2020   • Myofascial pain syndrome of lumbar spine 12/14/2020   • Overweight    • Vitamin D deficiency      Past Surgical History:   Procedure Laterality Date   • NO PAST SURGERIES       Family History   Problem Relation Age of Onset   • Hypertension Mother    • Diabetes type II Mother    • Stomach cancer Mother 48   • Depression Mother    • Hyperlipidemia Mother    • Mental illness Mother    • Depression Father    • Mental retardation Brother    • No Known Problems Daughter        Objective   /80   Pulse 82   Temp (!) 96.2 °F (35.7 °C) (Tympanic)   Resp 16   Ht 5' 10" (1.778 m)   Wt 83.9 kg (185 lb)   SpO2 98%   BMI 26.54 kg/m²   No LMP for male patient. Physical Exam     Physical Exam  Vitals and nursing note reviewed. Constitutional:       General: He is not in acute distress. Appearance: He is well-developed. He is ill-appearing. He is not toxic-appearing or diaphoretic. Comments: Mildly ill-appearing but in no acute distress. HENT:      Head: Normocephalic and atraumatic.       Mouth/Throat:      Mouth: Mucous membranes are moist.      Pharynx: No oropharyngeal exudate or posterior oropharyngeal erythema. Eyes:      General: No scleral icterus. Extraocular Movements: Extraocular movements intact. Conjunctiva/sclera: Conjunctivae normal.      Pupils: Pupils are equal, round, and reactive to light. Cardiovascular:      Rate and Rhythm: Normal rate and regular rhythm. Heart sounds: Normal heart sounds. No murmur heard. No friction rub. No gallop. Pulmonary:      Effort: Pulmonary effort is normal. No respiratory distress. Breath sounds: Normal breath sounds. No stridor. No wheezing, rhonchi or rales. Abdominal:      General: Abdomen is flat. Bowel sounds are normal. There is no distension or abdominal bruit. There are no signs of injury. Palpations: Abdomen is soft. There is no hepatomegaly, splenomegaly or mass. Tenderness: There is generalized abdominal tenderness. There is no guarding or rebound. Negative signs include Gloria's sign, Rovsing's sign and McBurney's sign. Hernia: No hernia is present. Musculoskeletal:      Cervical back: Normal range of motion and neck supple. Right lower leg: No edema. Left lower leg: No edema. Lymphadenopathy:      Cervical: No cervical adenopathy. Skin:     General: Skin is warm and dry. Coloration: Skin is not cyanotic, jaundiced, mottled or pale. Findings: No rash. Comments: Good turgor   Neurological:      General: No focal deficit present. Mental Status: He is alert and oriented to person, place, and time.    Psychiatric:         Mood and Affect: Mood normal.         Behavior: Behavior normal.

## 2023-08-01 ENCOUNTER — TELEPHONE (OUTPATIENT)
Dept: PAIN MEDICINE | Facility: MEDICAL CENTER | Age: 48
End: 2023-08-01

## 2023-08-01 NOTE — TELEPHONE ENCOUNTER
----- Message from Skip Mohan PA-C sent at 8/1/2023 12:59 PM EDT -----  Please let patient know his lumbar spine MRI reveals mild disc bulges with no spinal narrowing. Would recommend he schedule the previously discussed b/l SIJ if he is interested.

## 2023-08-03 ENCOUNTER — TELEPHONE (OUTPATIENT)
Age: 48
End: 2023-08-03

## 2023-08-03 NOTE — TELEPHONE ENCOUNTER
Caller: patient    Doctor: Mahnaz Day    Reason for call: would like to reschedule procedure    Call back#:

## 2023-08-03 NOTE — TELEPHONE ENCOUNTER
Caller: patient    Doctor: Lynn Martines     Reason for call: returning call    Call back#: [phone], OK to leave a detailed msg per patient

## 2023-08-03 NOTE — TELEPHONE ENCOUNTER
Called patient and scheduled:     Bilateral SIJ injections on 8/29    Reviewed instructions: , NPO 1 hour prior, loose-fitting/comfortable pants, if ill/fever/infx/abx to call and reschedule. No immunizations or vaccinations 2w prior/after steroid injections. Patient stated verbal understanding.

## 2023-08-03 NOTE — TELEPHONE ENCOUNTER
RN s/w pt regarding previous. Pt would like to schedule the bilat SIJ as discussed in the sovs note from 7/12 thank you.

## 2023-08-24 ENCOUNTER — PATIENT MESSAGE (OUTPATIENT)
Dept: FAMILY MEDICINE CLINIC | Facility: CLINIC | Age: 48
End: 2023-08-24

## 2023-08-28 DIAGNOSIS — F32.0 CURRENT MILD EPISODE OF MAJOR DEPRESSIVE DISORDER, UNSPECIFIED WHETHER RECURRENT (HCC): ICD-10-CM

## 2023-08-28 DIAGNOSIS — F41.9 ANXIETY: ICD-10-CM

## 2023-08-29 ENCOUNTER — LAB (OUTPATIENT)
Dept: LAB | Facility: CLINIC | Age: 48
End: 2023-08-29
Payer: COMMERCIAL

## 2023-08-29 DIAGNOSIS — F41.9 ANXIETY: ICD-10-CM

## 2023-08-29 DIAGNOSIS — F32.0 CURRENT MILD EPISODE OF MAJOR DEPRESSIVE DISORDER, UNSPECIFIED WHETHER RECURRENT (HCC): ICD-10-CM

## 2023-08-29 LAB
ALBUMIN SERPL BCP-MCNC: 4.6 G/DL (ref 3.5–5)
ALP SERPL-CCNC: 61 U/L (ref 34–104)
ALT SERPL W P-5'-P-CCNC: 18 U/L (ref 7–52)
ANION GAP SERPL CALCULATED.3IONS-SCNC: 13 MMOL/L
AST SERPL W P-5'-P-CCNC: 19 U/L (ref 13–39)
BILIRUB SERPL-MCNC: 0.53 MG/DL (ref 0.2–1)
BUN SERPL-MCNC: 22 MG/DL (ref 5–25)
CALCIUM SERPL-MCNC: 9.7 MG/DL (ref 8.4–10.2)
CHLORIDE SERPL-SCNC: 107 MMOL/L (ref 96–108)
CO2 SERPL-SCNC: 23 MMOL/L (ref 21–32)
CREAT SERPL-MCNC: 1.12 MG/DL (ref 0.6–1.3)
GFR SERPL CREATININE-BSD FRML MDRD: 76 ML/MIN/1.73SQ M
GLUCOSE P FAST SERPL-MCNC: 97 MG/DL (ref 65–99)
POTASSIUM SERPL-SCNC: 4.4 MMOL/L (ref 3.5–5.3)
PROT SERPL-MCNC: 7.3 G/DL (ref 6.4–8.4)
SODIUM SERPL-SCNC: 143 MMOL/L (ref 135–147)

## 2023-08-29 PROCEDURE — 36415 COLL VENOUS BLD VENIPUNCTURE: CPT

## 2023-08-29 PROCEDURE — 80053 COMPREHEN METABOLIC PANEL: CPT

## 2023-08-29 RX ORDER — FLUOXETINE 10 MG/1
CAPSULE ORAL
Qty: 60 CAPSULE | Refills: 0 | Status: SHIPPED | OUTPATIENT
Start: 2023-08-29 | End: 2023-09-07

## 2023-08-31 ENCOUNTER — DOCUMENTATION (OUTPATIENT)
Dept: FAMILY MEDICINE CLINIC | Facility: CLINIC | Age: 48
End: 2023-08-31

## 2023-09-03 ENCOUNTER — PATIENT MESSAGE (OUTPATIENT)
Dept: FAMILY MEDICINE CLINIC | Facility: CLINIC | Age: 48
End: 2023-09-03

## 2023-09-06 ENCOUNTER — HOSPITAL ENCOUNTER (OUTPATIENT)
Dept: RADIOLOGY | Facility: MEDICAL CENTER | Age: 48
Discharge: HOME | End: 2023-09-06
Payer: COMMERCIAL

## 2023-09-06 VITALS
OXYGEN SATURATION: 95 % | HEART RATE: 82 BPM | TEMPERATURE: 98.2 F | RESPIRATION RATE: 18 BRPM | DIASTOLIC BLOOD PRESSURE: 75 MMHG | SYSTOLIC BLOOD PRESSURE: 148 MMHG

## 2023-09-06 DIAGNOSIS — M46.1 SACROILIITIS, NOT ELSEWHERE CLASSIFIED (HCC): ICD-10-CM

## 2023-09-06 PROCEDURE — 27096 INJECT SACROILIAC JOINT: CPT | Performed by: PHYSICAL MEDICINE & REHABILITATION

## 2023-09-06 RX ORDER — METHYLPREDNISOLONE ACETATE 40 MG/ML
80 INJECTION, SUSPENSION INTRA-ARTICULAR; INTRALESIONAL; INTRAMUSCULAR; PARENTERAL; SOFT TISSUE ONCE
Status: COMPLETED | OUTPATIENT
Start: 2023-09-06 | End: 2023-09-06

## 2023-09-06 RX ORDER — BUPIVACAINE HCL/PF 2.5 MG/ML
3 VIAL (ML) INJECTION ONCE
Status: COMPLETED | OUTPATIENT
Start: 2023-09-06 | End: 2023-09-06

## 2023-09-06 RX ADMIN — Medication 3 ML: at 14:21

## 2023-09-06 RX ADMIN — METHYLPREDNISOLONE ACETATE 80 MG: 40 INJECTION, SUSPENSION INTRA-ARTICULAR; INTRALESIONAL; INTRAMUSCULAR; SOFT TISSUE at 14:21

## 2023-09-06 RX ADMIN — IOHEXOL 1 ML: 300 INJECTION, SOLUTION INTRAVENOUS at 14:21

## 2023-09-06 NOTE — H&P
History of Present Illness:  The patient is a 51 y.o. 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   • Depression    • ED (erectile dysfunction)    • History of Graves' disease     s/p Tapazole x 2 years, then radioactive iodine, which resulted in euthyroid status   • Hyperlipidemia    • Hypertension    • Low testosterone 5/22/2023   • Lumbar spondylosis 11/16/2020   • Myofascial pain syndrome of lumbar spine 12/14/2020   • Overweight    • Vitamin D deficiency        Past Surgical History:   Procedure Laterality Date   • NO PAST SURGERIES           Current Outpatient Medications:   •  Cholecalciferol (Vitamin D3) 125 MCG (5000 UT) TABS, Take 5,000 Units by mouth daily, Disp: , Rfl:   •  clomiPHENE (CLOMID) 50 mg tablet, Take 0.5 tablets (25 mg total) by mouth daily (Patient not taking: Reported on 7/3/2023), Disp: 15 tablet, Rfl: 3  •  [employer] FIBER GUMMIES PO, Take 2 tablets by mouth in the morning, Disp: , Rfl:   •  cyclobenzaprine (FLEXERIL) 10 mg tablet, Take 1 tablet (10 mg total) by mouth daily as needed for muscle spasms, Disp: 30 tablet, Rfl: 0  •  FLUoxetine (PROzac) 10 mg capsule, 1 tab by mouth in AM daily x 1 week, then increase to 2 tabs by mouth daily in AM thereafter., Disp: 60 capsule, Rfl: 0  •  losartan (COZAAR) 100 MG tablet, Take 1 tablet (100 mg total) by mouth daily, Disp: 90 tablet, Rfl: 2  •  meloxicam (MOBIC) 15 mg tablet, Take 1 tablet (15 mg total) by mouth daily (Patient not taking: Reported on 7/12/2023), Disp: 30 tablet, Rfl: 0  •  Multiple Vitamin (multivitamin) tablet, Take 1 tablet by mouth daily, Disp: , Rfl:   •  ondansetron (ZOFRAN-ODT) 4 mg disintegrating tablet, Take 1 tablet (4 mg total) by mouth every 6 (six) hours as needed for nausea or vomiting, Disp: 20 tablet, Rfl: 0  •  ondansetron (ZOFRAN-ODT) 4 mg disintegrating tablet, Take 1 tablet (4 mg total) by mouth every 6 (six) hours as needed for nausea or vomiting, Disp: 20 tablet, Rfl: 0  •  tadalafil (CIALIS) 2.5 MG tablet, Take 1 tablet (2.5 mg total) by mouth daily, Disp: 90 tablet, Rfl: 2    Current Facility-Administered Medications:   •  ondansetron (ZOFRAN-ODT) dispersible tablet 4 mg, 4 mg, Oral, Q6H PRN, Louie Kirk PA-C, 4 mg at 07/26/23 1303    No Known Allergies    Physical Exam:   Vitals:    09/06/23 1411   BP: 120/72   Pulse: 74   Resp: 18   Temp: 98.2 °F (36.8 °C)   SpO2: 96%     General: Awake, Alert, Oriented x 3, Mood and affect appropriate  Respiratory: Respirations even and unlabored  Cardiovascular: Peripheral pulses intact; no edema  Musculoskeletal Exam: bilateral SIJ pain    ASA Score: 2    Patient/Chart Verification  Patient ID Verified: Verbal  ID Band Applied: No  Consents Confirmed: Procedural, To be obtained in the Pre-Procedure area  H&P( within 30 days) Verified: To be obtained in the Pre-Procedure area  Interval H&P(within 24 hr) Complete (required for Outpatients and Surgery Admit only): To be obtained in the Pre-Procedure area  Allergies Reviewed: Yes  Anticoag/NSAID held?: NA  Currently on antibiotics?: No    Assessment:   1.  Sacroiliitis, not elsewhere classified ([address])        Plan: JUNO SIJ INJ ([id number], [id number])

## 2023-09-06 NOTE — DISCHARGE INSTRUCTIONS

## 2023-09-07 ENCOUNTER — OFFICE VISIT (OUTPATIENT)
Dept: FAMILY MEDICINE CLINIC | Facility: CLINIC | Age: 48
End: 2023-09-07
Payer: COMMERCIAL

## 2023-09-07 VITALS
DIASTOLIC BLOOD PRESSURE: 70 MMHG | BODY MASS INDEX: 26.45 KG/M2 | HEART RATE: 74 BPM | TEMPERATURE: 97.6 F | RESPIRATION RATE: 16 BRPM | SYSTOLIC BLOOD PRESSURE: 124 MMHG | WEIGHT: 184.8 LBS | HEIGHT: 70 IN | OXYGEN SATURATION: 95 %

## 2023-09-07 DIAGNOSIS — M21.612 BUNION, LEFT: ICD-10-CM

## 2023-09-07 DIAGNOSIS — M25.561 PAIN IN BOTH KNEES, UNSPECIFIED CHRONICITY: ICD-10-CM

## 2023-09-07 DIAGNOSIS — M76.62 TENDONITIS, ACHILLES, LEFT: ICD-10-CM

## 2023-09-07 DIAGNOSIS — F41.9 ANXIETY: Primary | ICD-10-CM

## 2023-09-07 DIAGNOSIS — E66.3 OVERWEIGHT: ICD-10-CM

## 2023-09-07 DIAGNOSIS — M25.562 PAIN IN BOTH KNEES, UNSPECIFIED CHRONICITY: ICD-10-CM

## 2023-09-07 DIAGNOSIS — R79.89 LOW TESTOSTERONE: ICD-10-CM

## 2023-09-07 DIAGNOSIS — M22.2X2 PATELLOFEMORAL SYNDROME OF BOTH KNEES: ICD-10-CM

## 2023-09-07 DIAGNOSIS — M47.816 LUMBAR SPONDYLOSIS: ICD-10-CM

## 2023-09-07 DIAGNOSIS — M22.2X1 PATELLOFEMORAL SYNDROME OF BOTH KNEES: ICD-10-CM

## 2023-09-07 DIAGNOSIS — I10 PRIMARY HYPERTENSION: ICD-10-CM

## 2023-09-07 DIAGNOSIS — F32.0 CURRENT MILD EPISODE OF MAJOR DEPRESSIVE DISORDER, UNSPECIFIED WHETHER RECURRENT (HCC): ICD-10-CM

## 2023-09-07 PROCEDURE — 99214 OFFICE O/P EST MOD 30 MIN: CPT | Performed by: FAMILY MEDICINE

## 2023-09-07 RX ORDER — FLUOXETINE HYDROCHLORIDE 40 MG/1
40 CAPSULE ORAL DAILY
Qty: 30 CAPSULE | Refills: 1 | Status: SHIPPED | OUTPATIENT
Start: 2023-09-07

## 2023-09-07 NOTE — PROGRESS NOTES
Assessment/Plan:  Problem List Items Addressed This Visit        Cardiovascular and Mediastinum    Hypertension     Stable. Check blood pressure outside of office. Recommend lifestyle modifications. Musculoskeletal and Integument    Lumbar spondylosis     Management per [facility]. Further Flexeril 10mg QD PRN per Pain Management. Other    Anxiety - Primary     Improved. Increase Prozac 40mg 1 pill daily. Continue counseling. Relevant Medications    FLUoxetine (PROzac) 40 MG capsule    Other Relevant Orders    Comprehensive metabolic panel    Depression     Improved. Increase Prozac 40mg 1 pill daily. Continue counseling. Relevant Medications    FLUoxetine (PROzac) 40 MG capsule    Other Relevant Orders    Comprehensive metabolic panel    Overweight     Stable. Recommend lifestyle modifications. Low testosterone     Stable. Continue Cialis 2.5mg QD. Unable to affod Clomid initiation per Uro, which may help increase Testosterone levels. He will call Uro to discuss testosterone replacement options. Other Visit Diagnoses     Pain in both knees, unspecified chronicity        Relevant Orders    Ambulatory Referral to Physical Therapy    Ambulatory Referral to Sports Medicine    Suspect patellofemoral syndrome. Home Exercise Program given. Advise Motrin /Tylenol  If no improvement s/p PT, to Sports Med. Nazia, left        Relevant Orders    Ambulatory Referral to Podiatry    Ambulatory Referral to Physical Therapy    Tendonitis, Achilles, left        Relevant Orders    Ambulatory Referral to Podiatry    Ambulatory Referral to Physical Therapy    Ambulatory Referral to Sports Medicine    Home Exercise Program given. Advise Motrin /Tylenol, RICE. If no improvement s/p PT, to Sports Med / Podiatry.         Patellofemoral syndrome of both knees        Relevant Orders    Ambulatory Referral to Physical Therapy Ambulatory Referral to Sports Medicine    See above. Return in about 6 weeks (around 10/19/2023) for F/U HTN, HL, Anx/Dep, LBP, ED, Labs. Future Appointments   Date Time Provider [address]   9/15/2023  2:00 PM [address] [place], [place] [address]vd, PT [address] END   10/24/2023 10:40 AM Roger Colemanpshire,  [facility]        Subjective:     Denver Woods is a 51 y.o. male who presents today for a follow-up on his chronic medical conditions. HPI:  Chief Complaint   Patient presents with   • Follow-up     F/U HTN, HL, Anx/Dep, LBP, ED, Labs. Go over medications. • Ankle Pain     L ankle/achilles pain started over the weekend. Using ice for pain management. Previous swelling, which has since resolved with rest.    • Foot Pain     L medial foot pain in the foot started a few months ago. Suspected bunion. Intermittent pain, nothing makes pain worse or better. • Knee Pain     B/l knee pain for the last few months. Using ice as needed for pain management. -- Above per clinical staff and reviewed. --      HPI      Today:    Return in about 6 weeks (around 8/14/2023) for F/U HTN, HL, Anx/Dep, LBP, ED, Labs    B/L Knee Pain - Symptoms x intermittently x 1 year. Under the knee cap, intermittent pain, worse c traveling on stairs. No pain c walking or running. Denies locking, popping, or giving way. Denies trauma. Left Medial Foot Pain - Symptoms x couple months. Concerned about bunion. Got new fitted running shoes s benefit. Has pain at left bunion c running. Left Achilles Pain - Symptoms x 4  Occurs c running. Using ice and rest c benefit.      Overweight - s/p Exercise Nutritionist Lilly Mcdaniel consult 5/3/23 - next appt 8/23 - overdue, plans to return in the future.  Watching diet.  He cut back on drinking soda and fast food.  Counting calories with lose it sheri. Drinking 2 small sodas daily. Exercise - Running, Jujitsu,  Weightlifting for 1 hour, 2 times per week.       HTN - On Losartan 100mg QD.  No other HTN meds previously.  No BP check outside of office.        Hyperlipidemia - No statin previously.     H/O Grave's Disease / Hyperthyroidism - s/p Tapazole x 2 years, then radioactive iodine, which resulted in euthyroid status.       Anxiety / Depression - On Prozac 20mg QD x 6 weeks. Feels mood is improved 50% and stable. D/C increased Effexor XR 225mg QD due to nightmares and inefficacy.  D/C Wellbutrin XL 300mg QD in AM and Cymbalta 90mg QD.  Mood is unchanged and he feels flat. Mood is "not great' - since summer 2022 - increased stress at work and in marriage. [facility] feels "life is heavy," mind races, not sleeping well, is unmotivated. [facility] feels Wellbutrin is not working, but cannot specifically state why.  Previous Management per Psych at [address] - last seen   - F/U PRN as was D/C to PCP Management.  On Wellbutrin XL 300mg QD in AM since .  Previously on Zoloft, Prozac - did fine with these meds, Lexapro 20mg QD ineffective.  No other mood meds or higher dose previously. [facility] Bo[facility] counseling q2 weeks - marriage counseling with wife, and individual - helpful.  Next appt .  Poor social supports.  No SI/HI/AH/VH.         PHQ-2/9 Depression Screening    Little interest or pleasure in doing things: 1 - several days  Feeling down, depressed, or hopeless: 1 - several days  Trouble falling or staying asleep, or sleeping too much: 1 - several days  Feeling tired or having little energy: 1 - several days  Poor appetite or overeatin - not at all  Feeling bad about yourself - or that you are a failure or have let yourself or your family down: 1 - several days  Trouble concentrating on things, such as reading the newspaper or watching television: 0 - not at all  Moving or speaking so slowly that other people could have noticed.  Or the opposite - being so fidgety or restless that you have been moving around a lot more than usual: 0 - not at all  Thoughts that you would be better off dead, or of hurting yourself in some way: 0 - not at all  PHQ-9 Score: 5   PHQ-9 Interpretation: Mild depression          CORA-7 Flowsheet Screening    Flowsheet Row Most Recent Value   Over the last 2 weeks, how often have you been bothered by any of the following problems? Feeling nervous, anxious, or on edge 1   Not being able to stop or control worrying 1   Worrying too much about different things 1   Trouble relaxing 1   Being so restless that it is hard to sit still 0   Becoming easily annoyed or irritable 1   Feeling afraid as if something awful might happen 0   CORA-7 Total Score 5          ED - Management per [facility] Dr. Jennifer Hilton.  Next appt 8/23 s/p hormonal labs - overdue.  On Cialis 2.5mg QD - helpful.  Patient feels due to Lexapro and Wellbutrin.  D/C Viagra 50mg QD PRN - helpful, but desired daily Rx.  No other ED meds or higher doses previously.     Chronic Lower Back Pain / Lumbar Spondylosis / Myofascial Pain Syndrome of Lumbar Spine / Bertolotti's Syndrome / Coccydynia  - Management per pain Management Dr. Twin Nguyen.  Next appt? S/p B/L SI joint injection 9/6/23 - worse symptoms today.   s/p 6/5/23 for [facility]C s benefit.  Management per Orth[facility] Sp[facility] Dr. Aixa Cowart - next appt PRN.  Has daily spasms on medial sacrum, radiates into B/L buttocks.  Injured his back in 2015 during  training in [address] is using Flexeril 10mg 1/2 -1 daily PRN - needing to use 1/2 pill daily as spasms are worsening, taking 2-3 times per week when not working. Juan Rommel seen [facility] 2021.  S/p ALEC s benefit.  Last MRI in [place] in [id number].  Surgery was not advised per advised.  Last PT in [place] 2018 s benefit. [facility] is attending PT 2 days per week and doing HEP, but causes pain.         Vitamin D Deficiency - No Drisdol.  Taking MVI and OTC Vitamin D 5,000IU daily.       Low Testosterone - Management per [facility] Dr. Jennifer Hilton.  Next appt 8/23 s/p hormonal labs - pt cancelled appt due to inability to afford Clomid and declination of Testosterone cream due to risk of prostate cancer.       H/O False Positive HIV test - s/p [facility] e-consult per Dr. Sandra Kemp 10/7/22, who advised ID F/U vs. PCP repeat the testing at three months, six months and one year, and include HIV PCR testing with the routine screening if it does not already reflex that way.      Normal colonoscopy 11/12/20 by GI Dr. Rukhsana Terrell @ [facility].  Repeat in 10 years.             From previous note:    Return in about 6 weeks (around 7/3/2023) for F/U HTN, HL, Anx/Dep, LBP, ED, Labs.        Return in 6 weeks (on 5/23/2023) for F/U HTN, HL, Anx/Dep, LBP, ED, Labs.     Patient did not complete labs 5/22/23.         Overweight - s/p Exercise Nutritionist Lilly Mcdaniel consult 5/3/23 - next appt 8/23.  Watching diet.  He cut back on drinking soda and fast food.  Counting calories with lose it sheri. Drinking 0-2 small sodas daily. +Exercise - Weightlifting for 1 hour, 2 times per week. Earnestine Lugo would like to resume running.     HTN - On Losartan 100mg QD.  No other HTN meds previously.  No BP check outside of office.        Hyperlipidemia - No statin previously.     H/O Grave's Disease / Hyperthyroidism - s/p Tapazole x 2 years, then radioactive iodine, which resulted in euthyroid status.       Anxiety / Depression - On increased Effexor XR 150mg QD x 6 weeks.  D/C Wellbutrin XL 300mg QD in AM and Cymbalta 90mg QD.  Mood is unchanged and he feels flat.   Mood is "not great' - since summer 2022 - increased stress at work and in marriage. Earnestine Lugo feels "life is heavy," mind races, not sleeping well, is unmotivated. Earnestine Lugo feels Wellbutrin is not working, but cannot specifically state why.  Previous Management per Psych at [address] - last seen 2022  - F/U PRN as was D/C to PCP Management.  On Wellbutrin XL 300mg QD in AM since 9/22.  Previously on Zoloft, Prozac - did fine with these meds, Lexapro 20mg QD ineffective.  No other mood meds or higher dose previously. Alicia ortega q2 weeks - marriage counseling with wife, and individual - helpful.  Next appt .  Poor social supports.  No SI/HI/AH/VH.          PHQ-2/9 Depression Screening       Little interest or pleasure in doing things: 1 - several days  Feeling down, depressed, or hopeless: 1 - several days  Trouble falling or staying asleep, or sleeping too much: 1 - several days  Feeling tired or having little energy: 1 - several days  Poor appetite or overeatin - not at all  Feeling bad about yourself - or that you are a failure or have let yourself or your family down: 1 - several days  Trouble concentrating on things, such as reading the newspaper or watching television: 0 - not at all  Moving or speaking so slowly that other people could have noticed.  Or the opposite - being so fidgety or restless that you have been moving around a lot more than usual: 0 - not at all  Thoughts that you would be better off dead, or of hurting yourself in some way: 0 - not at all  PHQ-9 Score: 5   PHQ-9 Interpretation: Mild depression                 CORA-7 Flowsheet Screening    Flowsheet Row Most Recent Value   Over the last 2 weeks, how often have you been bothered by any of the following problems?     Feeling nervous, anxious, or on edge 0   Not being able to stop or control worrying 1   Worrying too much about different things 1   Trouble relaxing 1   Being so restless that it is hard to sit still 0   Becoming easily annoyed or irritable 1   Feeling afraid as if something awful might happen 0   CORA-7 Total Score 4             ED - Management per [facility] Dr. Kermit Becerra.  Next appt  s/p hormonal labs.  On Cialis 2.5mg QD - helpful.  Patient feels due to Lexapro and Wellbutrin.  D/C Viagra 50mg QD PRN - helpful, but desired daily Rx.  No other ED meds or higher doses previously.     Chronic Lower Back Pain / Lumbar Spondylosis / Myofascial Pain Syndrome of Lumbar Spine / Bertolotti's Syndrome / Coccydynia  - Management per pain Management  Taye Best.  Next appt 7/23, s/p 6/5/23 for [facility] s benefit.  Management per [facility] Sp[facility] Dr. Navin Michaud - next appt PRN.  On Effexor XR 150mg QD x 6 weeks.  No change in back pain.  Has daily spasms on medial sacrum, radiates into B/L buttocks.  Injured his back in 2015 during  training in [address] [facility] [address] is using Flexeril 10mg 1/2 -1 daily PRN - needing to use 1/2 pill daily as spasms are worsening, taking 2-3 times per week when not working. Ivanna Gibbons seen [address] [facility].  S/p ALEC s benefit.  Last MRI in [place] in [id number].  Surgery was not advised per advised.  Last PT in [place] 2018 s benefit. [facility] Carr is attending PT 2 days per week and doing HEP, but causes pain.         Vitamin D Deficiency - No Drisdol.  Taking MVI and OTC Vitamin D 5,000IU daily.       Low Testosterone - Management per [facility] Dr. Chapincito Dangelo.  Next appt 8/23 s/p hormonal labs.     H/O False Positive HIV test - s/p [facility] e-consult per Dr. Randy Martin 10/7/22, who advised ID F/U vs. PCP repeat the testing at three months, six months and one year, and include HIV PCR testing with the routine screening if it does not already reflex that way.      Normal colonoscopy 11/12/20 by GI Dr. Rochelle Sylvester @ [facility].  Repeat in 10 years.            From previous note:     Return in 6 weeks (on 5/23/2023) for F/U HTN, HL, Anx/Dep, LBP, ED, Labs.     PTO c [place]        Overweight - Pending Exercise Nutritionist - Pending 5/22/23.  Watching diet.  He cut back on drinking soda and fast food.  +Exercise - Weightlifting for 1 hour, 2 times per week. Vernon Carr would like to resume running.     HTN - On Losartan 100mg QD.  No other HTN meds previously.  No BP check outside of office.     Hyperlipidemia - No statin previously.     H/O Grave's Disease / Hyperthyroidism - s/p Tapazole x 2 years, then radioactive iodine, which resulted in euthyroid status.       Anxiety / Depression - On Effexor XR 75mg QD x 6 weeks.   D/C Wellbutrin XL 300mg QD in AM and Cymbalta 90mg QD x 6 weeks as ineffective.  Mood is unchanged and is "not great' - since summer 2022 - increased stress at work and in marriage. [place] feels "life is heavy," mind races, not sleeping well, is unmotivated. [place] feels Wellbutrin is not working, but cannot specifically state why.  Previous Management per Psych at [address] - last seen   - F/U PRN as was D/C to PCP Management.  On Wellbutrin XL 300mg QD in AM since .  Previously on Zoloft, Prozac - did fine with these meds, Lexapro 20mg QD ineffective.  No other mood meds or higher dose previously. [place] [facility] counseling q2 weeks - marriage counseling with wife, and individual - helpful.  Next appt .  Poor social supports.  No SI/HI/AH/VH.       PHQ-2/9 Depression Screening       Little interest or pleasure in doing things: 1 - several days  Feeling down, depressed, or hopeless: 2 - more than half the days  Trouble falling or staying asleep, or sleeping too much: 2 - more than half the days  Feeling tired or having little energy: 2 - more than half the days  Poor appetite or overeatin - several days  Feeling bad about yourself - or that you are a failure or have let yourself or your family down: 1 - several days  Trouble concentrating on things, such as reading the newspaper or watching television: 0 - not at all  Moving or speaking so slowly that other people could have noticed.  Or the opposite - being so fidgety or restless that you have been moving around a lot more than usual: 0 - not at all  Thoughts that you would be better off dead, or of hurting yourself in some way: 0 - not at all  PHQ-9 Score: 9   PHQ-9 Interpretation: Mild depression                   CORA-7 Flowsheet Screening    Flowsheet Row Most Recent Value   Over the last 2 weeks, how often have you been bothered by any of the following problems?     Feeling nervous, anxious, or on edge 1   Not being able to stop or control worrying 1   Worrying too much about different things 1   Trouble relaxing 1   Being so restless that it is hard to sit still 0   Becoming easily annoyed or irritable 1   Feeling afraid as if something awful might happen 0   CORA-7 Total Score 5             ED - Management per [facility] Dr. Chari Dancer.  Next appt 6/23 s/p hormonal labs.  On Cialis 2.5mg QD - helpful.  Patient feels due to Lexapro and Wellbutrin.  D/C Viagra 50mg QD PRN - helpful, but desired daily Rx.  No other ED meds or higher doses previously.     Chronic Lower Back Pain / Lumbar Spondylosis / Myofascial Pain Syndrome of Lumbar Spine / Bertolotti's Syndrome / Coccydynia  - Management per pain Management Dr. Sana Ron.  Next appt 6/5/23 for ALEC.  Management per [facility] Sp[facility] Dr. Gabriel Skinner - next appt PRN.  On Effexor XR 75mg QD x 6 weeks.  No change in back pain.  Has daily spasms on medial sacrum, radiates into B/L buttocks.  Injured his back in 2015 during  training in [address] [place] [address] is using Flexeril 10mg 1/2 -1 daily PRN - needing to use 1/2 pill daily as spasms are worsening, previously taking 3-4 times per week.   Previously seen [facility] 2021.  S/p ALEC s benefit.  Last MRI in [place] in [id number].  Surgery was not advised per advised.  Last PT in [place] 2018 s benefit. [facility] is attending PT 2 days per week and doing HEP, but causes pain.         Vitamin D Deficiency - No Drisdol.  Taking MVI and OTC Vitamin D 5,000IU daily.       Low Testosterone - Management per [facility] Dr. Chari Dancer.  Next appt 6/23 s/p hormonal labs.     H/O False Positive HIV test - s/p [facility] e-consult per Dr. Troy Number 10/7/22, who advised ID F/U vs. PCP repeat the testing at three months, six months and one year, and include HIV PCR testing with the routine screening if it does not already reflex that way.      Normal colonoscopy 11/12/20 by GI Dr. Anika Noel @ [facility].   Repeat in 10 years.                 From previous note:        Return in 6 weeks (on 4/11/2023) for F/U HTN, HL, Anx/Dep, LBP, ED, Labs.        Overweight - Would like to see Nutritionist - Pending 5/3/23.  Watching diet.  He cut back on drinking soda and fast food.  +Exercise - [employer] for 1 hour, 2-3 times per week.       HTN - On Losartan 100mg QD.  No other HTN meds previously.  No BP check outside of office.        Hyperlipidemia - No statin previously.     H/O Grave's Disease / Hyperthyroidism - s/p Tapazole x 2 years, then radioactive iodine, which resulted in euthyroid status.       Anxiety / Depression - On increased Cymbalta 90mg QD x 6 weeks.  Mood is unchanged and is "not great' - since summer 2022 - increased stress at work and in marriage. [place] feels "life is heavy," mind races, not sleeping well, is unmotivated. [place] feels Wellbutrin is not working, but cannot specifically state why.  Previous Management per Psych at [address] - last seen   - F/U PRN as was D/C to PCP Management.  On Wellbutrin XL 300mg QD in AM since .  Previously on Zoloft, Prozac - did fine with these meds, Lexapro 20mg QD ineffective.  No other mood meds or higher dose previously. [place] counseling q2 weeks - marriage counseling with wife, and individual - helpful.  Next appt .  Poor social supports.  No SI/HI/AH/VH.       PHQ-2/9 Depression Screening       Little interest or pleasure in doing things: 1 - several days  Feeling down, depressed, or hopeless: 1 - several days  Trouble falling or staying asleep, or sleeping too much: 2 - more than half the days  Feeling tired or having little energy: 2 - more than half the days  Poor appetite or overeatin - not at all  Feeling bad about yourself - or that you are a failure or have let yourself or your family down: 1 - several days  Trouble concentrating on things, such as reading the newspaper or watching television: 0 - not at all  Moving or speaking so slowly that other people could have noticed.  Or the opposite - being so fidgety or restless that you have been moving around a lot more than usual: 0 - not at all  Thoughts that you would be better off dead, or of hurting yourself in some way: 0 - not at all  PHQ-9 Score: 7   PHQ-9 Interpretation: Mild depression                      CORA-7 Flowsheet Screening    Flowsheet Row Most Recent Value   Over the last 2 weeks, how often have you been bothered by any of the following problems?     Feeling nervous, anxious, or on edge 1   Not being able to stop or control worrying 1   Worrying too much about different things 1   Trouble relaxing 1   Being so restless that it is hard to sit still 0   Becoming easily annoyed or irritable 1   Feeling afraid as if something awful might happen 0   CORA-7 Total Score 5                ED - On Cialis 2.5mg QD - helpful.  Patient feels due to Lexapro and Wellbutrin.  D/C Viagra 50mg QD PRN - helpful, but desired daily Rx.  No other ED meds or higher doses previously.     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  No change in back pain.  Has daily spasms on medial sacrum, radiates into B/L buttocks.  Injured his back in 2015 during  training in [place].  He is using Flexeril 10mg 1/2 -1 daily PRN - needing to use 1/2 pill daily as spasms are worsening, previously taking 3-4 times per week.   Previously seen [facility] 2021.  S/p ALEC s benefit.  Last MRI in [place] in [id number].  Surgery was not advised per advised.  Last PT in [place] 2018 s benefit.       Vitamin D Deficiency - No Drisdol.  Taking MVI and OTC Vitamin D 5,000IU daily.       Low Testosterone - Pending U[facility] consult Dr. Michelle Boogie 5/23.     H/O False Positive HIV test - s/p [facility] e-consult per Dr. Nacho Briggs 10/7/22, who advised ID F/U vs. PCP repeat the testing at three months, six months and one year, and include HIV PCR testing with the routine screening if it does not already reflex that way.      Normal colonoscopy 11/12/20 by GI Dr. Melgoza Poster @ [facility].   Repeat in 10 years.                From previous note:     Return in about 6 weeks (around 2023) for F/U HTN, HL, Anx/Dep, ED, LBP, Labs.     Patient did not complete labs 2023.     Overweight - Would like to see Nutritionist - Pending 5/3/23.  Trying to watch diet.  He cut back on drinking soda, but eats fast food.  +Exercise - [place] [employer] for 1 hour, 2-3 times per week.       HTN - On Losartan 100mg QD.  No other HTN meds previously.  No BP check outside of office.        Hyperlipidemia - No statin previously.     H/O  Grave's Disease / Hyperthyroidism - s/p Tapazole x 2 years, then radioactive iodine, which resulted in euthyroid status.       Anxiety / Depression - On Cymbalta 60mg QD x 6 weeks.  Mood is unchanged and is "not great' - since summer 2022 - increased stress at work and in marriage.  Previous Management per Psych at [address] - last seen   - F/U PRN as was D/C to PCP Management.  On Wellbutrin XL 300mg QD in AM since .  Previously on Zoloft, Prozac - did fine with these meds, Lexapro 20mg QD ineffective.  No other mood meds or higher dose previously. [facility] counseling q2 weeks - marriage counseling with wife, and individual - helpful.  Next appt .  Poor social supports.  No SI/HI/AH/VH.          PHQ-2/9 Depression Screening       Little interest or pleasure in doing things: 1 - several days  Feeling down, depressed, or hopeless: 1 - several days  Trouble falling or staying asleep, or sleeping too much: 1 - several days  Feeling tired or having little energy: 2 - more than half the days  Poor appetite or overeatin - several days  Feeling bad about yourself - or that you are a failure or have let yourself or your family down: 1 - several days  Trouble concentrating on things, such as reading the newspaper or watching television: 0 - not at all  Moving or speaking so slowly that other people could have noticed.  Or the opposite - being so fidgety or restless that you have been moving around a lot more than usual: 0 - not at all  Thoughts that you would be better off dead, or of hurting yourself in some way: 0 - not at all  PHQ-9 Score: 7   PHQ-9 Interpretation: Mild depression                      CORA-7 Flowsheet Screening    Flowsheet Row Most Recent Value   Over the last 2 weeks, how often have you been bothered by any of the following problems?     Feeling nervous, anxious, or on edge 0   Not being able to stop or control worrying 0   Worrying too much about different things 0   Trouble relaxing 1   Being so restless that it is hard to sit still 0   Becoming easily annoyed or irritable 0   Feeling afraid as if something awful might happen 0   CORA-7 Total Score 1             ED - On Cialis 2.5mg QD x 6 weeks - helpful.  Patient feels due to Lexapro and Wellbutrin.  D/C Viagra 50mg QD PRN - helpful, but desired daily Rx.  Patient is interested in daily Ramonia Kia is unsure if Rx is covered by insurance. No other ED meds or higher doses previously.     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  No change in back pain.  Has daily spasms on medial sacrum, radiates into B/L buttocks.  Injured his back in 2015 during  training in [address] [place] [address] is using Flexeril 10mg 1/2 -1 daily PRN - taking 3-4 times per week.   Previously seen [facility] 2021.  S/p ALEC s benefit.  Last MRI in [place] in [id number].  Surgery was not advised per advised.  Last PT in [place] 2018 s benefit.       Vitamin D Deficiency - No Drisdol.  Taking MVI, but taking OTC Vitamin D 5,000IU daily.       H/O False Positive HIV test - s/p [facility] e-consult per Dr. Sung Heard 10/7/22, who advised ID F/U vs. PCP repeat the testing at three months, six months and one year, and include HIV PCR testing with the routine screening if it does not already reflex that way.      Normal colonoscopy 11/12/20 by GI Dr. Sorensen Drivers @ [facility].   Repeat in 10 years.          From previous note:     Controlled Substance Review     [place] PDMP or [place]  reviewed: No red flags were identified; safe to proceed with prescription.      Hair Loss - Wants to know if Nutrafol is safe.  He had not tried Rogaine yet.       Overweight - Would like to see Nutritionist.  Not watching diet.  Drinks soda, eats fast food.  +Exercise - [employer] for 1 hours 2-3 times per week.       HTN - On Losartan 100mg QD.  No other HTN meds previously.  No BP check outside of office.        Hyperlipidemia - No statin previously.     H/O  Grave's Disease / Hyperthyroidism - s/p Tapazole x 2 years, then radioactive iodine, which resulted in euthyroid status.       Anxiety / Depression - Mood is "not great' - since summer 2022 - increased stress at work and in marriage.  Previous Management per Psych at [address] - last seen   - F/U PRN as was D/C to PCP Management.  On Lexapro 20mg QD since  and Wellbutrin XL 300mg QD in AM since .  Previously on Zoloft, Prozac - did fine with these meds.  No other mood meds or higher dose previously. [facility] counseling q2 weeks.  Next appt .        PHQ-2/9 Depression Screening       Little interest or pleasure in doing things: 2 - more than half the days  Feeling down, depressed, or hopeless: 2 - more than half the days  Trouble falling or staying asleep, or sleeping too much: 2 - more than half the days  Feeling tired or having little energy: 2 - more than half the days  Poor appetite or overeatin - not at all  Feeling bad about yourself - or that you are a failure or have let yourself or your family down: 1 - several days  Trouble concentrating on things, such as reading the newspaper or watching television: 0 - not at all  Moving or speaking so slowly that other people could have noticed.  Or the opposite - being so fidgety or restless that you have been moving around a lot more than usual: 0 - not at all  Thoughts that you would be better off dead, or of hurting yourself in some way: 0 - not at all  PHQ-2 Score: 4  PHQ-2 Interpretation: POSITIVE depression screen  PHQ-9 Score: 9   PHQ-9 Interpretation: Mild depression                      CORA-7 Flowsheet Screening    Flowsheet Row Most Recent Value   Over the last 2 weeks, how often have you been bothered by any of the following problems?     Feeling nervous, anxious, or on edge 1   Not being able to stop or control worrying 2   Worrying too much about different things 2   Trouble relaxing 2   Being so restless that it is hard to sit still 0   Becoming easily annoyed or irritable 1   Feeling afraid as if something awful might happen 0   CORA-7 Total Score 8          MDQ:  0, Asynchronous, No Problem     ED - Patient feels due to Lexapro and Wellbutrin.  On Viagra 50mg QD PRN - helpful.  Patient is interested in daily Ivon Rule is unsure if Rx is covered by insurance. No other ED meds or higher doses previously.     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  Injured his back in 2015 during  training in [address] [place] [address] is using Flexeril 10mg 1/2 -1 daily PRN - taking 3-4 times per week.   Previously seen [facility] 2021.  S/p ALEC s benefit.  Last MRI in [place] in [id number].  Surgery was not advised per advised.  Last PT in [place] 2018 s benefit.       Vitamin D Deficiency - No Drisdol.  Not taking MVI, but taking OTC Vitamin D 5,000IU daily.       H/O False Positive HIV test - s/p [facility] e-consult per Dr. Jenny Eller 10/7/22, who advised ID F/U vs. PCP repeat the testing at three months, six months and one year, and include HIV PCR testing with the routine screening if it does not already reflex that way.      Normal colonoscopy 11/12/20 by GI Dr. Radha Robledo @ [facility].  Repeat in 10 years.       Reviewed:  Labs 8/29/23, Pain Management 7/12/23, Nutrition 5/3/23, Uro 5/9/23, Ortho 5/3/23     Sees Dentist q6 months.   Sees Optho q2 years.             The following portions of the patient's history were reviewed and updated as appropriate: allergies, current medications, past family history, past medical history, past social history, past surgical history and problem list.      Review of Systems   Constitutional: Positive for fatigue. Negative for appetite change, chills, diaphoresis and fever. Respiratory: Negative for chest tightness and shortness of breath. Cardiovascular: Negative for chest pain. Gastrointestinal: Negative for abdominal pain, blood in stool, diarrhea, nausea and vomiting. Genitourinary: Negative for dysuria. Musculoskeletal: Positive for back pain. Current Outpatient Medications   Medication Sig Dispense Refill   • Cholecalciferol (Vitamin D3) 125 MCG (5000 UT) TABS Take 5,000 Units by mouth daily     • [employer] FIBER GUMMIES PO Take 2 tablets by mouth in the morning     • cyclobenzaprine (FLEXERIL) 10 mg tablet Take 1 tablet (10 mg total) by mouth daily as needed for muscle spasms 30 tablet 0   • FLUoxetine (PROzac) 40 MG capsule Take 1 capsule (40 mg total) by mouth daily 30 capsule 1   • losartan (COZAAR) 100 MG tablet Take 1 tablet (100 mg total) by mouth daily 90 tablet 2   • Multiple Vitamin (multivitamin) tablet Take 1 tablet by mouth daily     • tadalafil (CIALIS) 2.5 MG tablet Take 1 tablet (2.5 mg total) by mouth daily 90 tablet 2   • clomiPHENE (CLOMID) 50 mg tablet Take 0.5 tablets (25 mg total) by mouth daily (Patient not taking: Reported on 7/3/2023) 15 tablet 3     No current facility-administered medications for this visit.        Objective:  /70   Pulse 74   Temp 97.6 °F (36.4 °C)   Resp 16   Ht 5' 10" (1.778 m)   Wt 83.8 kg (184 lb 12.8 oz)   SpO2 95%   BMI 26.52 kg/m²    Wt Readings from Last 3 Encounters:   09/07/23 83.8 kg (184 lb 12.8 oz)   07/26/23 83.9 kg (185 lb)   07/12/23 83.9 kg (185 lb)      BP Readings from Last 3 Encounters:   09/07/23 124/70   09/06/23 148/75   07/26/23 129/80          Physical Exam  Vitals and nursing note reviewed. Constitutional:       Appearance: Normal appearance. He is well-developed. HENT:      Head: Normocephalic and atraumatic. Eyes:      Conjunctiva/sclera: Conjunctivae normal.   Neck:      Thyroid: No thyromegaly. Cardiovascular:      Rate and Rhythm: Normal rate and regular rhythm. Pulses: Normal pulses. Heart sounds: Normal heart sounds. Pulmonary:      Effort: Pulmonary effort is normal.      Breath sounds: Normal breath sounds. Musculoskeletal:         General: Tenderness present. No swelling. Cervical back: Neck supple. Right knee: Normal.      Instability Tests: Anterior drawer test negative. Posterior drawer test negative. Anterior Lachman test negative. Medial Flaco test negative and lateral Flaco test negative. Left knee: Normal.      Instability Tests: Anterior drawer test negative. Posterior drawer test negative. Anterior Lachman test negative. Medial Flaco test negative and lateral Flaco test negative. Right lower leg: No edema. Left lower leg: No edema. Right ankle:      Right Achilles Tendon: Normal. Whiteside's test negative. Left ankle:      Left Achilles Tendon: Tenderness (Mild at distal insertion) present. Whiteside's test negative. Left foot: Bunion (Tender to palpation) present. Lymphadenopathy:      Cervical: No cervical adenopathy. Neurological:      General: No focal deficit present. Mental Status: He is alert and oriented to person, place, and time. Psychiatric:         Mood and Affect: Mood normal.         Behavior: Behavior normal.         Thought Content:  Thought content normal.         Judgment: Judgment normal.         Lab Results:      Lab Results   Component Value Date    WBC 6.32 02/09/2023    HGB 16.6 02/09/2023    HCT 52.2 (H) 02/09/2023     02/09/2023    TRIG 207 (H) 02/09/2023    HDL 37 (L) 02/09/2023    LDLDIRECT 116 (H) 02/09/2023    ALT 18 08/29/2023 AST 19 08/29/2023    K 4.4 08/29/2023     08/29/2023    CREATININE 1.12 08/29/2023    BUN 22 08/29/2023    CO2 23 08/29/2023    PSA 0.64 05/26/2023    GLUF 97 08/29/2023    HGBA1C 5.5 02/09/2023     No results found for: "URICACID"  Invalid input(s): "BASENAME" Vitamin D    FL spine and pain procedure    Result Date: 9/6/2023  Narrative: Indication: Low back/buttock pain Preoperative Diagnosis: 1. Sacroiliac Joint Pain 2. Sacroiliitis Postoperative Diagnosis: 1. Sacroiliac Joint Pain 2. Sacroiliitis Procedure: Fluoroscopically-guided injection of the bilateral sacroiliac joints EBL: none Specimens: not applicable After discussing the risks, benefits, and alternatives to the procedure, the patient expressed understanding and wished to proceed. The patient was brought to the fluoroscopy suite and placed in the prone position. Procedural pause conducted to verify: correct patient identity, procedure to be performed and as applicable, correct side and site, correct patient position, and availability of implants, special equipment and special requirements. Using fluoroscopy, the inferior portion of the sacroiliac joint was identified. The skin was sterilely prepped and draped in the usual fashion using Chloraprep skin prep. Using fluoroscopic guidance, a 3.5 inch 25 gauge spinal needle was advanced into joint. Proper needle positioning was confirmed using multiple fluoroscopic views. After negative aspiration, Omnipaque 300 contrast was injected, showing intraarticular spread of contrast without any evidence of intravascular uptake. A 2.5 mL solution consisting of 40 mg of Depo-Medrol in 0.25% bupivacaine was injected slowly and incrementally into the joint. Following the injection, the needle was withdrawn. The procedure was repeated in the exact same way on the opposite side. The patient tolerated the procedure well and there were no apparent complications.  After appropriate observation, the patient was dismissed from the clinic in good condition under their own power.        POCT Labs

## 2023-09-07 NOTE — ASSESSMENT & PLAN NOTE
Stable. Continue Cialis 2.5mg QD. Unable to affod Clomid initiation per Uro, which may help increase Testosterone levels. He will call Uro to discuss testosterone replacement options.

## 2023-09-11 ENCOUNTER — APPOINTMENT (OUTPATIENT)
Dept: RADIOLOGY | Facility: MEDICAL CENTER | Age: 48
End: 2023-09-11
Payer: COMMERCIAL

## 2023-09-11 ENCOUNTER — OFFICE VISIT (OUTPATIENT)
Dept: OBGYN CLINIC | Facility: MEDICAL CENTER | Age: 48
End: 2023-09-11
Payer: COMMERCIAL

## 2023-09-11 VITALS
BODY MASS INDEX: 26.34 KG/M2 | DIASTOLIC BLOOD PRESSURE: 74 MMHG | WEIGHT: 184 LBS | HEART RATE: 77 BPM | HEIGHT: 70 IN | SYSTOLIC BLOOD PRESSURE: 108 MMHG

## 2023-09-11 DIAGNOSIS — M22.2X2 PATELLOFEMORAL SYNDROME OF BOTH KNEES: ICD-10-CM

## 2023-09-11 DIAGNOSIS — M25.561 PAIN IN BOTH KNEES, UNSPECIFIED CHRONICITY: ICD-10-CM

## 2023-09-11 DIAGNOSIS — M25.562 PAIN IN BOTH KNEES, UNSPECIFIED CHRONICITY: ICD-10-CM

## 2023-09-11 DIAGNOSIS — M22.2X1 PATELLOFEMORAL SYNDROME OF BOTH KNEES: ICD-10-CM

## 2023-09-11 PROCEDURE — 99213 OFFICE O/P EST LOW 20 MIN: CPT | Performed by: ORTHOPAEDIC SURGERY

## 2023-09-11 PROCEDURE — 73564 X-RAY EXAM KNEE 4 OR MORE: CPT

## 2023-09-11 NOTE — LETTER
September 11, 2023     Abrahan Bautista, [address]  [address] [facility]    Patient: Marianna Sosa   YOB: 1975   Date of Visit: 9/11/2023       Dear Dr. Josiah Galeazzi: Thank you for referring Marianna Sosa to me for evaluation. Below are my notes for this consultation. If you have questions, please do not hesitate to call me. I look forward to following your patient along with you.          Sincerely,        Rod Mabry, DO        CC: No Recipients

## 2023-09-11 NOTE — PROGRESS NOTES
Ortho Sports Medicine Knee Visit     Assesment:   bilateral knee mild patella chondromalacia    Plan:    Conservative treatment:    Ice to knee for 20 minutes at least 1-2 times daily. PT for ROM/strengthening to knee, hip and core, hamstring and quad stretching, OTC NSAIDS prn for pain. Avoid repetitive stairs, hills while running. See back in 8 weeks if symptoms persist then may get MRI to further evaluate. Imaging: All imaging from today was reviewed by myself and explained to the patient. Injection:    No Injection planned at this time. Surgery:     No surgery is recommended at this point, continue with conservative treatment plan as noted. History of Present Illness: The patient is a 51 y.o. male whose occupation is [employer] employee, referred to me by their primary care physician, seen in clinic for consultation of bilateral knee pain. Patient states his pain is intermittent in nature anterior portion of knee. No injury or trauma. The pain is characterized as dull, achy. He is a runner. Pain is improved by rest, ice and NSAIDS. Pain is aggravated by stairs, squatting and running. Symptoms include clicking, popping and cracking. The patient has tried rest, ice and NSAIDS.           Knee Surgical History:  None    Past Medical, Social and Family History:  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   • Depression    • ED (erectile dysfunction)    • History of Graves' disease     s/p Tapazole x 2 years, then radioactive iodine, which resulted in euthyroid status   • Hyperlipidemia    • Hypertension    • Low testosterone 5/22/2023   • Lumbar spondylosis 11/16/2020   • Myofascial pain syndrome of lumbar spine 12/14/2020   • Overweight    • Vitamin D deficiency      Past Surgical History:   Procedure Laterality Date   • NO PAST SURGERIES       No Known Allergies  Current Outpatient Medications on File Prior to Visit Medication Sig Dispense Refill   • Cholecalciferol (Vitamin D3) 125 MCG (5000 UT) TABS Take 5,000 Units by mouth daily     • [employer] FIBER GUMMIES PO Take 2 tablets by mouth in the morning     • cyclobenzaprine (FLEXERIL) 10 mg tablet Take 1 tablet (10 mg total) by mouth daily as needed for muscle spasms 30 tablet 0   • FLUoxetine (PROzac) 40 MG capsule Take 1 capsule (40 mg total) by mouth daily 30 capsule 1   • losartan (COZAAR) 100 MG tablet Take 1 tablet (100 mg total) by mouth daily 90 tablet 2   • Multiple Vitamin (multivitamin) tablet Take 1 tablet by mouth daily     • tadalafil (CIALIS) 2.5 MG tablet Take 1 tablet (2.5 mg total) by mouth daily 90 tablet 2   • clomiPHENE (CLOMID) 50 mg tablet Take 0.5 tablets (25 mg total) by mouth daily (Patient not taking: Reported on 7/3/2023) 15 tablet 3     No current facility-administered medications on file prior to visit. Social History     Socioeconomic History   • Marital status: /Civil Union     Spouse name: Not on file   • Number of children: 1   • Years of education: Not on file   • Highest education level: Not on file   Occupational History   • Occupation: Paramedic with [employer]   Tobacco Use   • Smoking status: Former     Packs/day: 1.50     Years: 19.00     Total pack years: 28.50     Types: Cigarettes     Start date: 1992     Quit date: 2011     Years since quittin.7   • Smokeless tobacco: Never   Vaping Use   • Vaping Use: Never used   Substance and Sexual Activity   • Alcohol use:  Yes     Alcohol/week: 3.0 standard drinks of alcohol     Types: 3 Standard drinks or equivalent per week     Comment: occasionally, socially   • Drug use: Not Currently     Types: Marijuana     Comment: Last use 22yo   • Sexual activity: Yes     Partners: Female     Birth control/protection: None   Other Topics Concern   • Not on file   Social History Narrative        Lives with wife    1 Child - 1 Daughter    Paramedic with [employer]     Social Determinants of Health     Financial Resource Strain: Not on file   Food Insecurity: Not on file   Transportation Needs: Not on file   Physical Activity: Not on file   Stress: Not on file   Social Connections: Not on file   Intimate Partner Violence: Not on file   Housing Stability: Not on file         I have reviewed the past medical, surgical, social and family history, medications and allergies as documented in the EMR. Review of systems: ROS is negative other than that noted in the HPI. Constitutional: Negative for fatigue and fever. HENT: Negative for sore throat. Respiratory: Negative for shortness of breath. Cardiovascular: Negative for chest pain. Gastrointestinal: Negative for abdominal pain. Endocrine: Negative for cold intolerance and heat intolerance. Genitourinary: Negative for flank pain. Musculoskeletal: Negative for back pain. Skin: Negative for rash. Allergic/Immunologic: Negative for immunocompromised state. Neurological: Negative for dizziness. Psychiatric/Behavioral: Negative for agitation. Physical Exam:    Blood pressure 108/74, pulse 77, height 5' 10" (1.778 m), weight 83.5 kg (184 lb).     General/Constitutional: NAD, well developed, well nourished  HENT: Normocephalic, atraumatic  CV: Intact distal pulses, regular rate  Resp: No respiratory distress or labored breathing  Lymphatic: No lymphadenopathy palpated  Neuro: Alert and Oriented x 3, no focal deficits  Psych: Normal mood, normal affect, normal judgement, normal behavior  Skin: Warm, dry, no rashes, no erythema       Knee Exam (focused):                  RIGHT LEFT   ROM:   0-130 0-130   Palpation: Effusion negative negative     MJL tenderness Negative Negative     LJL tenderness Negative Negative   Instability: Varus stable stable     Valgus stable stable   Special Tests: Lachman Negative Negative     Posterior drawer Negative Negative     Anterior drawer Negative Negative     Pivot shift not tested not tested     Dial not tested not tested   Patella: Palpation no tenderness no tenderness     Mobility 1/4 1/4     Apprehension Negative Negative   Other: Single leg 1/4 squat not tested not tested      Bilateral hamstring tightness     LE NV Exam: +2 DP/PT pulses bilaterally  Sensation intact to light touch L2-S1 bilaterally     Bilateral hip ROM demonstrates no pain actively or passively    No calf tenderness to palpation bilaterally    Knee Imaging    X-rays of the bilateral knee were reviewed, which demonstrate well maintained joint spaces small marginal spurring. I have reviewed the radiology report and do not currently have a radiology reading from  [facility], but will check the result once the reading is performed.         Scribe Attestation    I,:  Craig Pappas am acting as a scribe while in the presence of the attending physician.:       I,:  Sultana Saravia, DO personally performed the services described in this documentation    as scribed in my presence.:

## 2023-09-13 ENCOUNTER — TELEPHONE (OUTPATIENT)
Dept: PAIN MEDICINE | Facility: CLINIC | Age: 48
End: 2023-09-13

## 2023-09-13 NOTE — TELEPHONE ENCOUNTER
Caller: Florentino Steiner   Doctor/office: Leonel Duran   CB#:     % of improvement: 0%  Pain Scale (1-10): 5/10

## 2023-09-15 ENCOUNTER — OFFICE VISIT (OUTPATIENT)
Dept: PHYSICAL THERAPY | Facility: MEDICAL CENTER | Age: 48
End: 2023-09-15

## 2023-09-15 DIAGNOSIS — M25.561 CHRONIC PAIN OF RIGHT KNEE: ICD-10-CM

## 2023-09-15 DIAGNOSIS — M25.562 CHRONIC PAIN OF LEFT KNEE: ICD-10-CM

## 2023-09-15 DIAGNOSIS — S86.002D INJURY OF LEFT ACHILLES TENDON, SUBSEQUENT ENCOUNTER: Primary | ICD-10-CM

## 2023-09-15 DIAGNOSIS — G89.29 CHRONIC PAIN OF LEFT KNEE: ICD-10-CM

## 2023-09-15 DIAGNOSIS — G89.29 CHRONIC PAIN OF RIGHT KNEE: ICD-10-CM

## 2023-09-15 PROCEDURE — 97110 THERAPEUTIC EXERCISES: CPT | Performed by: PHYSICAL THERAPIST

## 2023-09-15 PROCEDURE — 97161 PT EVAL LOW COMPLEX 20 MIN: CPT | Performed by: PHYSICAL THERAPIST

## 2023-09-15 NOTE — TELEPHONE ENCOUNTER
Left Message for patient to call back and schedule follow up visit with Oleg Bridges.   Please schedule when Patient calls back

## 2023-09-15 NOTE — PROGRESS NOTES
PT Evaluation     Today's date: 9/15/2023  Patient name: Honey Galeana  : 1975  MRN: [id number]  Referring provider: Shiloh Luke, *  Dx: No diagnosis found. Assessment  Assessment details: Honey Galeana is a 51 y.o. male presents with L Achilles pain and intermittent chronic anterior knee pain. Honey Galeana has the below listed impairments and will benefit from skilled PT to improve deficits to return to prior level of function. Impairments: abnormal muscle firing, abnormal or restricted ROM, impaired physical strength and pain with function  Understanding of Dx/Px/POC: good   Prognosis: good    Goals  Impairment Goals  - Decrease pain to 0/10  - Improve ROM equal to contralateral side  - Increase strength equal to contralateral side    Functional Goals  - Patient will be independent with comprehensive HEP  - Ambulation is improved to prior level of function  - Stair climbing on large steps at work is improved to prior level of function  - Squatting is improved to prior level of function      Plan  Patient would benefit from: skilled PT  Referral necessary: No  Planned therapy interventions: home exercise program, manual therapy, neuromuscular re-education, patient education, functional ROM exercises, strengthening, stretching and joint mobilization  Frequency: 1x week  Duration in weeks: 12  Treatment plan discussed with: patient        Subjective Evaluation    History of Present Illness  Mechanism of injury: Genna Haley reports he was running 9 miles and started having L Achilles pain on 9/3. Rested for about 1 week and tried to run again. He ran 3 miles but had a lot of pain. He does not pain currently in L Achilles at rest but have tenderness in the area. He has a 10K 10/21 and /2 marathon on . He started running last year and has intermittent knee pain. Currently working paramedic.    Patient Goals  Patient goals for therapy: decreased pain and return to sport/leisure activities    Pain  At worst pain ratin          Objective     Static Posture     Ankle/Foot   Ankle/Foot (Left): Pes planus and pronated. Ankle/Foot (Right): Pes planus and pronated. Tenderness   Left Knee   Tenderness in the patellar tendon. Right Knee   Tenderness in the patellar tendon. Left Ankle/Foot   Tenderness in the Achilles insertion. Active Range of Motion   Left Knee   Normal active range of motion    Right Knee   Normal active range of motion  Left Ankle/Foot   Dorsiflexion (ke): WFL  Plantar flexion: WFL    Right Ankle/Foot   Dorsiflexion (ke): WFL  Plantar flexion: WFL    Passive Range of Motion   Left Hip   Flexion: [facility] [place]  External rotation (90/90): [facility] [place]  External rotation (prone): [facility] [place]  Internal rotation (90/90): [facility] [place]  Internal rotation (prone): WFL    Right Hip   Flexion: [facility] [place]  External rotation (90/90): [facility] [place]  External rotation (prone): [facility] [place]  Internal rotation (90/90): [facility] [place]  Internal rotation (prone): [facility] [place]    Joint Play   Left Ankle/Foot  Joints within functional limits are the talocrural joint. Hypermobile in the midfoot and forefoot. Right Ankle/Foot  Joints within functional limits are the talocrural joint. Hypermobile in the midfoot and forefoot. Strength/Myotome Testing     Left Hip   Planes of Motion   Flexion: 5  Extension: 4  Abduction: 4  External rotation: 5  Internal rotation: 5    Right Hip   Planes of Motion   Flexion: 5  Extension: 4  Abduction: 4  External rotation: 5  Internal rotation: 5    Left Knee   Flexion: 4+  Extension: 5    Right Knee   Flexion: 4  Extension: 5    Tests     Left Hip   Negative Ely's and DENNYS Morales: Positive. SLR: Positive. Right Hip   Negative Ely's and DENNYS Morales: Positive. SLR: Positive. Left Ankle/Foot   Negative for calcaneal squeeze and Whiteside.      Ambulation     Observational Gait   Left stance time, right stance time, left swing time, right swing time, left step length and right step length within functional limits. Decreased stride length. Quality of Movement During Gait     Ankle    Ankle (Left): Positive pronated. Ankle (Right): Positive pronated.               Precautions: none      Manuals 9/15                                                                Neuro Re-Ed                                                                                                        Ther Ex             HS stretch 30"x2            Piriformis stretch mod 30"x2            Hip flexor stretch 1/2 kneel 30"x2            gastroc stretch off step bent knee 30"x2                                                                Ther Activity                                       Gait Training                                       Modalities

## 2023-09-20 ENCOUNTER — OFFICE VISIT (OUTPATIENT)
Dept: PHYSICAL THERAPY | Facility: MEDICAL CENTER | Age: 48
End: 2023-09-20

## 2023-09-20 DIAGNOSIS — S86.002D INJURY OF LEFT ACHILLES TENDON, SUBSEQUENT ENCOUNTER: Primary | ICD-10-CM

## 2023-09-20 DIAGNOSIS — M25.561 CHRONIC PAIN OF RIGHT KNEE: ICD-10-CM

## 2023-09-20 DIAGNOSIS — M25.562 CHRONIC PAIN OF LEFT KNEE: ICD-10-CM

## 2023-09-20 DIAGNOSIS — G89.29 CHRONIC PAIN OF RIGHT KNEE: ICD-10-CM

## 2023-09-20 DIAGNOSIS — G89.29 CHRONIC PAIN OF LEFT KNEE: ICD-10-CM

## 2023-09-20 PROCEDURE — 97140 MANUAL THERAPY 1/> REGIONS: CPT | Performed by: PHYSICAL THERAPIST

## 2023-09-20 PROCEDURE — 97110 THERAPEUTIC EXERCISES: CPT | Performed by: PHYSICAL THERAPIST

## 2023-09-20 NOTE — PROGRESS NOTES
Daily Note     Today's date: 2023  Patient name: Korey Gutierrez  : 1975  MRN: [id number]  Referring provider: Mary Brady, *  Dx:   Encounter Diagnosis     ICD-10-CM    1. Injury of left Achilles tendon, subsequent encounter  S86.002D       2. Chronic pain of right knee  M25.561     G89.29       3. Chronic pain of left knee  M25.562     G89.29                      Subjective: Clemente Ocampo reports he did a hike this weekend that had over 800 steps. His calf is a little sore      Objective: See treatment diary below      Assessment: Tolerated treatment well. Patient had pain with DL heel raises and therefore only completed 1 set      Plan: Progress treatment as tolerated.        Precautions: none      Manuals 9/15 9/20           Laser L Achilles  JE           STM L gastroc  JE                                     Neuro Re-Ed                                                                                                        Ther Ex             HS stretch 30"x2 30"x2           Piriformis stretch mod 30"x2 30"x2           Hip flexor stretch 1/2 kneel 30"x2 30"x2           gastroc stretch gr strap  30"x2           gastroc stretch off step bent knee 30"x2 30"x2           PF TB black  ecc 2x10           Heel raises  1x10 pain           bridges  2x10           Side stepping TB  20'x4 Gr           SLS             Lat step ups  x10 ea L2           Ther Activity                                       Gait Training                                       Modalities

## 2023-09-26 ENCOUNTER — APPOINTMENT (OUTPATIENT)
Dept: RADIOLOGY | Facility: CLINIC | Age: 48
End: 2023-09-26
Payer: COMMERCIAL

## 2023-09-26 ENCOUNTER — OFFICE VISIT (OUTPATIENT)
Dept: PHYSICAL THERAPY | Facility: MEDICAL CENTER | Age: 48
End: 2023-09-26
Payer: COMMERCIAL

## 2023-09-26 ENCOUNTER — OFFICE VISIT (OUTPATIENT)
Dept: PODIATRY | Facility: CLINIC | Age: 48
End: 2023-09-26
Payer: COMMERCIAL

## 2023-09-26 VITALS
DIASTOLIC BLOOD PRESSURE: 89 MMHG | SYSTOLIC BLOOD PRESSURE: 128 MMHG | HEIGHT: 70 IN | BODY MASS INDEX: 26.34 KG/M2 | WEIGHT: 184 LBS | HEART RATE: 72 BPM

## 2023-09-26 DIAGNOSIS — M76.62 TENDONITIS, ACHILLES, LEFT: ICD-10-CM

## 2023-09-26 DIAGNOSIS — M79.672 PAIN IN LEFT FOOT: Primary | ICD-10-CM

## 2023-09-26 DIAGNOSIS — M25.561 CHRONIC PAIN OF RIGHT KNEE: ICD-10-CM

## 2023-09-26 DIAGNOSIS — G89.29 CHRONIC PAIN OF RIGHT KNEE: ICD-10-CM

## 2023-09-26 DIAGNOSIS — M21.612 BUNION, LEFT: ICD-10-CM

## 2023-09-26 DIAGNOSIS — G89.29 CHRONIC PAIN OF LEFT KNEE: ICD-10-CM

## 2023-09-26 DIAGNOSIS — M25.562 CHRONIC PAIN OF LEFT KNEE: ICD-10-CM

## 2023-09-26 DIAGNOSIS — S86.002D INJURY OF LEFT ACHILLES TENDON, SUBSEQUENT ENCOUNTER: Primary | ICD-10-CM

## 2023-09-26 PROCEDURE — 73630 X-RAY EXAM OF FOOT: CPT

## 2023-09-26 PROCEDURE — 97140 MANUAL THERAPY 1/> REGIONS: CPT | Performed by: PHYSICAL THERAPIST

## 2023-09-26 PROCEDURE — 99202 OFFICE O/P NEW SF 15 MIN: CPT | Performed by: PODIATRIST

## 2023-09-26 PROCEDURE — 97110 THERAPEUTIC EXERCISES: CPT | Performed by: PHYSICAL THERAPIST

## 2023-09-26 NOTE — PROGRESS NOTES
Assessment/Plan:  Treatment options for left great toe bunion deformity discussed with patient. Conservative options versus surgical options reviewed. Will require x-rays to further evaluate the relative amount of deformity and underlying great toe joint structures. Rx x-rays complete left foot, x-rays personally reviewed show mild/moderate hallux valgus deformity with a 13.5 degree IM angle and tibial sesamoid position 4-5, mild degenerative changes of the first MPJ without dorsal osteophyte. No other fracture dislocation noted. Recommend patient try shoe gear modification to see if a different style of shoe may afford him some more comfort with his running. Continue with physical therapy as scheduled for his Achilles tendinitis. Continue with daily stretching icing regimen for Achilles tendon. Follow-up in approximately 6 to 8 weeks. No problem-specific Assessment & Plan notes found for this encounter. Diagnoses and all orders for this visit:    Pain in left foot    Bunion, left  -     Ambulatory Referral to Podiatry  -     X-ray foot left 3+ views; Future    Tendonitis, Achilles, left  -     Ambulatory Referral to Podiatry          Subjective:      Patient ID: Mouna Scott is a 51 y.o. male. 9/26/2023: 76-year-old male presents today concerned with left great toe joint pain in addition to his left Achilles tendinitis which he is going to PT for. Patient is an active runner. Denies any injury. The following portions of the patient's history were reviewed and updated as appropriate: allergies, current medications, past family history, past medical history, past social history, past surgical history, and problem list.    Review of Systems   Constitutional: Negative. HENT: Negative. Eyes: Negative. Respiratory: Negative. Cardiovascular: Negative. Gastrointestinal: Negative. Endocrine: Negative. Genitourinary: Negative.     Musculoskeletal:         Painful left great toe joint and left heel. Skin: Negative. Allergic/Immunologic: Negative. Neurological: Negative. Hematological: Negative. Psychiatric/Behavioral: Negative. Objective:      /89   Pulse 72   Ht 5' 10" (1.778 m)   Wt 83.5 kg (184 lb)   BMI 26.40 kg/m²          Physical Exam  Constitutional:       Appearance: Normal appearance. HENT:      Head: Normocephalic. Right Ear: Tympanic membrane normal.      Left Ear: Tympanic membrane normal.      Nose: No congestion. Mouth/Throat:      Pharynx: No oropharyngeal exudate or posterior oropharyngeal erythema. Eyes:      Conjunctiva/sclera: Conjunctivae normal.      Pupils: Pupils are equal, round, and reactive to light. Cardiovascular:      Rate and Rhythm: Normal rate and regular rhythm. Pulses: Normal pulses. Pulmonary:      Effort: Pulmonary effort is normal.   Musculoskeletal:         General: Tenderness and deformity present. Right ankle: Normal.      Right Achilles Tendon: Normal.      Left ankle:      Left Achilles Tendon: Tenderness present. No defects. Whiteside's test negative. Left foot: Bunion and tenderness present. Comments: Left foot: Mild/moderate hallux valgus deformity noted of the left great toe joint. Mild tenderness with palpation, range of motion 25 degrees, no crepitus noted. No dorsal osteophytes palpable. Left Achilles tendon: Mild tenderness with palpation, no defect or evidence of rupture noted. Feet:      Right foot:      Protective Sensation: 10 sites tested. Left foot:      Protective Sensation: 10 sites tested. Skin:     General: Skin is warm and dry. Capillary Refill: Capillary refill takes 2 to 3 seconds. Coloration: Skin is not pale. Findings: No bruising, erythema, lesion or rash. Neurological:      Mental Status: He is alert. Cranial Nerves: No cranial nerve deficit. Sensory: No sensory deficit. Motor: No weakness.       Gait: Gait normal.      Deep Tendon Reflexes: Reflexes normal.   Psychiatric:         Mood and Affect: Mood normal.         Behavior: Behavior normal.         Judgment: Judgment normal.

## 2023-09-26 NOTE — PROGRESS NOTES
Daily Note     Today's date: 2023  Patient name: Mio Segovia  : 1975  MRN: [id number]  Referring provider: Bennie Gale, *  Dx:   Encounter Diagnosis     ICD-10-CM    1. Injury of left Achilles tendon, subsequent encounter  S86.002D       2. Chronic pain of right knee  M25.561     G89.29       3. Chronic pain of left knee  M25.562     G89.29                      Subjective: Hillman reports he had pain going down the stairs yesterday      Objective: See treatment diary below. Able to perform DL HR of equal height with mild soreness. Completed 7 SL HR's with pain and less height compared to R. Assessment: Tolerated treatment well. Patient exhibited good technique with therapeutic exercises      Plan: Progress treatment as tolerated.        Precautions: none      Manuals 9/15 9/20 9/26          Laser L Achilles  JE JE          STM L gastroc  JE JE                                    Neuro Re-Ed                                                                                                        Ther Ex             HS stretch 30"x2 30"x2 30"x2          Piriformis stretch mod 30"x2 30"x2 30"x2          Hip flexor stretch 1/2 kneel 30"x2 30"x2 30"x2          gastroc stretch gr strap  30"x2 30"x2          gastroc stretch off step bent knee 30"x2 30"x2 30"x2          PF TB black  ecc 2x10 ecc 2x10          Heel raises  1x10 pain 3x10          bridges  2x10 2x12          Side stepping TB  20'x4 Gr 20'x4          SLS   ABC 1x          Lat step ups  x10 ea L2 Up & over L2          Ther Activity                                       Gait Training                                       Modalities

## 2023-10-05 ENCOUNTER — OFFICE VISIT (OUTPATIENT)
Dept: PHYSICAL THERAPY | Facility: MEDICAL CENTER | Age: 48
End: 2023-10-05

## 2023-10-05 ENCOUNTER — OFFICE VISIT (OUTPATIENT)
Dept: PAIN MEDICINE | Facility: MEDICAL CENTER | Age: 48
End: 2023-10-05
Payer: COMMERCIAL

## 2023-10-05 VITALS
SYSTOLIC BLOOD PRESSURE: 128 MMHG | WEIGHT: 184 LBS | BODY MASS INDEX: 26.34 KG/M2 | HEIGHT: 70 IN | DIASTOLIC BLOOD PRESSURE: 89 MMHG

## 2023-10-05 DIAGNOSIS — G57.03 PIRIFORMIS SYNDROME OF BOTH SIDES: ICD-10-CM

## 2023-10-05 DIAGNOSIS — M25.561 CHRONIC PAIN OF RIGHT KNEE: ICD-10-CM

## 2023-10-05 DIAGNOSIS — G89.29 CHRONIC PAIN OF RIGHT KNEE: ICD-10-CM

## 2023-10-05 DIAGNOSIS — M25.562 CHRONIC PAIN OF LEFT KNEE: ICD-10-CM

## 2023-10-05 DIAGNOSIS — M79.18 MYOFASCIAL PAIN SYNDROME: Primary | ICD-10-CM

## 2023-10-05 DIAGNOSIS — G89.29 CHRONIC PAIN OF LEFT KNEE: ICD-10-CM

## 2023-10-05 DIAGNOSIS — S86.002D INJURY OF LEFT ACHILLES TENDON, SUBSEQUENT ENCOUNTER: Primary | ICD-10-CM

## 2023-10-05 PROCEDURE — 99214 OFFICE O/P EST MOD 30 MIN: CPT | Performed by: PHYSICIAN ASSISTANT

## 2023-10-05 PROCEDURE — 97110 THERAPEUTIC EXERCISES: CPT | Performed by: PHYSICAL THERAPIST

## 2023-10-05 PROCEDURE — 97140 MANUAL THERAPY 1/> REGIONS: CPT | Performed by: PHYSICAL THERAPIST

## 2023-10-05 RX ORDER — METHOCARBAMOL 500 MG/1
500 TABLET, FILM COATED ORAL 4 TIMES DAILY
Qty: 60 TABLET | Refills: 2 | Status: SHIPPED | OUTPATIENT
Start: 2023-10-05

## 2023-10-05 NOTE — PROGRESS NOTES
Daily Note     Today's date: 10/5/2023  Patient name: Debra Hamlin  : 1975  MRN: [id number]  Referring provider: Joslyn Paredes, *  Dx:   Encounter Diagnosis     ICD-10-CM    1. Injury of left Achilles tendon, subsequent encounter  S86.002D       2. Chronic pain of right knee  M25.561     G89.29       3. Chronic pain of left knee  M25.562     G89.29                      Subjective: Florentino Steiner reports he is not going to run in his races this fall 2* ongoing pain. Objective: See treatment diary below      Assessment: Tolerated treatment well. Patient exhibited good technique with therapeutic exercises      Plan: Progress treatment as tolerated.        Precautions: none      Manuals 9/15 9/20 9/26 10/5         Laser L Achilles  JE JE JE         STM L gastroc  JE JE JE                                   Neuro Re-Ed                                                                                                        Ther Ex             HS stretch 30"x2 30"x2 30"x2 30"x2         Piriformis stretch mod 30"x2 30"x2 30"x2 30"x2         Hip flexor stretch 1/2 kneel 30"x2 30"x2 30"x2 30"x2         gastroc stretch gr strap  30"x2 30"x2 30"x2         gastroc stretch off step bent knee 30"x2 30"x2 30"x2 30"x2         PF TB black  ecc 2x10 ecc 2x10 ecc 2x10         Heel raises  1x10 pain 3x10          bridges  2x10 2x12          Side stepping TB  20'x4 Gr 20'x4 20'x4         SLS   ABC 1x rebounder 30x2         Lat step ups  x10 ea L2 Up & over L2          steamboats    3#, 5# ext, flex         Heel raises 2 up 1 down    2x10         SL heel raises    2x10         Ther Activity                                       Gait Training                                       Modalities

## 2023-10-05 NOTE — PROGRESS NOTES
Assessment:  1. Myofascial pain syndrome    2. Piriformis syndrome of both sides        Plan:  While the patient was in the office today, I did have a thorough conversation regarding their chronic pain syndrome, medication management, and treatment plan options. At discussing options, I have recommended that we proceed with diagnostic and hopefully therapeutic bilateral piriformis injections under fluoroscopy. I have prescribed methocarbamol 500 mg twice daily as needed tightness and spasm. I advised the patient that they should not drive or operate machinery while on this medication until they see how it affects them, as it could cause lethargy and mental cloudiness. I advised the patient to call our office if they experience any side effects or issues with the medication changes. The patient verbalized an understanding. Complete risks and benefits including bleeding, infection, tissue reaction, nerve injury and allergic reaction were discussed. The approach was demonstrated using models and literature was provided. Verbal and written consent was obtained. My impressions and treatment recommendations were discussed in detail with the patient who verbalized understanding and had no further questions. Discharge instructions were provided. I personally saw and examined the patient and I agree with the above discussed plan of care. Orders Placed This Encounter   Procedures   • FL spine and pain procedure     Standing Status:   Future     Standing Expiration Date:   10/5/2027     Order Specific Question:   Reason for Exam:     Answer:   b/l piriformis inj     Order Specific Question:   Anticoagulant hold needed?      Answer:   no     New Medications Ordered This Visit   Medications   • methocarbamol (ROBAXIN) 500 mg tablet     Sig: Take 1 tablet (500 mg total) by mouth 4 (four) times a day     Dispense:  60 tablet     Refill:  2       History of Present Illness:  Natalia Kanner is a 51 y.o. male who presents for a follow up office visit in regards to Back Pain. The patient’s current symptoms include low back pain and bilateral buttock pain that he presently rates a 6 out of 10 and describes it as a constant dull, aching, sharp pain. He has intermittent spasms in the buttocks that occur with sitting. Patient denies any lower extremity radicular features. He underwent bilateral sacroiliac joint injections with no relief proceeded by a sacrococcygeal joint injection with no improvement either. I have personally reviewed and/or updated the patient's past medical history, past surgical history, family history, social history, current medications, allergies, and vital signs today. Review of Systems   Constitutional: Negative for chills and fever. HENT: Negative for ear pain and sore throat. Eyes: Negative for pain and visual disturbance. Respiratory: Negative for cough and shortness of breath. Cardiovascular: Negative for chest pain and palpitations. Gastrointestinal: Negative for abdominal pain and vomiting. Genitourinary: Negative for dysuria and hematuria. Musculoskeletal: Positive for back pain, gait problem and myalgias. Negative for arthralgias. Skin: Negative for color change and rash. Neurological: Positive for weakness. Negative for seizures and syncope. All other systems reviewed and are negative.       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   • Depression    • ED (erectile dysfunction)    • History of Graves' disease     s/p Tapazole x 2 years, then radioactive iodine, which resulted in euthyroid status   • Hyperlipidemia    • Hypertension    • Low testosterone 2023   • Lumbar spondylosis 2020   • Myofascial pain syndrome of lumbar spine 2020   • Overweight    • Vitamin D deficiency        Past Surgical History:   Procedure Laterality Date   • NO PAST SURGERIES         Family History   Problem Relation Age of Onset   • Hypertension Mother    • Diabetes type II Mother    • Stomach cancer Mother 48   • Depression Mother    • Hyperlipidemia Mother    • Mental illness Mother    • Depression Father    • Mental retardation Brother    • No Known Problems Daughter        Social History     Occupational History   • Occupation: Paramedic with [employer]   Tobacco Use   • Smoking status: Former     Packs/day: 1.50     Years: 19.00     Total pack years: 28.50     Types: Cigarettes     Start date: 1992     Quit date: 2011     Years since quittin.7   • Smokeless tobacco: Never   Vaping Use   • Vaping Use: Never used   Substance and Sexual Activity   • Alcohol use:  Yes     Alcohol/week: 3.0 standard drinks of alcohol     Types: 3 Standard drinks or equivalent per week     Comment: occasionally, socially   • Drug use: Not Currently     Types: Marijuana     Comment: Last use 22yo   • Sexual activity: Yes     Partners: Female     Birth control/protection: None       Current Outpatient Medications on File Prior to Visit   Medication Sig   • Cholecalciferol (Vitamin D3) 125 MCG (5000 UT) TABS Take 5,000 Units by mouth daily   • [employer] FIBER GUMMIES PO Take 2 tablets by mouth in the morning   • cyclobenzaprine (FLEXERIL) 10 mg tablet Take 1 tablet (10 mg total) by mouth daily as needed for muscle spasms   • FLUoxetine (PROzac) 40 MG capsule Take 1 capsule (40 mg total) by mouth daily   • losartan (COZAAR) 100 MG tablet Take 1 tablet (100 mg total) by mouth daily   • Multiple Vitamin (multivitamin) tablet Take 1 tablet by mouth daily   • tadalafil (CIALIS) 2.5 MG tablet Take 1 tablet (2.5 mg total) by mouth daily   • clomiPHENE (CLOMID) 50 mg tablet Take 0.5 tablets (25 mg total) by mouth daily (Patient not taking: Reported on 7/3/2023)     No current facility-administered medications on file prior to visit. No Known Allergies    Physical Exam:    /89   Ht 5' 10" (1.778 m)   Wt 83.5 kg (184 lb)   BMI 26.40 kg/m²     Constitutional:normal, well developed, well nourished, alert, in no distress and non-toxic and no overt pain behavior. Eyes:anicteric  HEENT:grossly intact  Neck:supple, symmetric, trachea midline and no masses   Pulmonary:even and unlabored  Cardiovascular:No edema or pitting edema present  Skin:Normal without rashes or lesions and well hydrated  Psychiatric:Mood and affect appropriate  Neurologic:Cranial Nerves II-XII grossly intact  Musculoskeletal: Lumbar spine is minimally tender, tenderness to palpation over bilateral piriformis. Gait is stable.     Imaging

## 2023-10-12 ENCOUNTER — LAB (OUTPATIENT)
Dept: LAB | Facility: CLINIC | Age: 48
End: 2023-10-12
Payer: COMMERCIAL

## 2023-10-12 DIAGNOSIS — F41.9 ANXIETY: ICD-10-CM

## 2023-10-12 DIAGNOSIS — F32.0 CURRENT MILD EPISODE OF MAJOR DEPRESSIVE DISORDER, UNSPECIFIED WHETHER RECURRENT (HCC): ICD-10-CM

## 2023-10-12 DIAGNOSIS — Z11.4 ENCOUNTER FOR SCREENING FOR HIV: ICD-10-CM

## 2023-10-12 DIAGNOSIS — R79.89 LOW TESTOSTERONE IN MALE: ICD-10-CM

## 2023-10-12 LAB
ALBUMIN SERPL BCP-MCNC: 4.5 G/DL (ref 3.5–5)
ALP SERPL-CCNC: 66 U/L (ref 34–104)
ALT SERPL W P-5'-P-CCNC: 32 U/L (ref 7–52)
ANION GAP SERPL CALCULATED.3IONS-SCNC: 7 MMOL/L
AST SERPL W P-5'-P-CCNC: 29 U/L (ref 13–39)
BILIRUB SERPL-MCNC: 0.71 MG/DL (ref 0.2–1)
BUN SERPL-MCNC: 23 MG/DL (ref 5–25)
CALCIUM SERPL-MCNC: 9.9 MG/DL (ref 8.4–10.2)
CHLORIDE SERPL-SCNC: 104 MMOL/L (ref 96–108)
CO2 SERPL-SCNC: 27 MMOL/L (ref 21–32)
CREAT SERPL-MCNC: 1.18 MG/DL (ref 0.6–1.3)
GFR SERPL CREATININE-BSD FRML MDRD: 72 ML/MIN/1.73SQ M
GLUCOSE P FAST SERPL-MCNC: 91 MG/DL (ref 65–99)
HIV 1+2 AB+HIV1 P24 AG SERPL QL IA: NORMAL
HIV 2 AB SERPL QL IA: NORMAL
HIV1 AB SERPL QL IA: NORMAL
HIV1 P24 AG SERPL QL IA: NORMAL
POTASSIUM SERPL-SCNC: 4.1 MMOL/L (ref 3.5–5.3)
PROT SERPL-MCNC: 7.2 G/DL (ref 6.4–8.4)
SODIUM SERPL-SCNC: 138 MMOL/L (ref 135–147)

## 2023-10-12 PROCEDURE — 36415 COLL VENOUS BLD VENIPUNCTURE: CPT

## 2023-10-12 PROCEDURE — 87389 HIV-1 AG W/HIV-1&-2 AB AG IA: CPT

## 2023-10-12 PROCEDURE — 80053 COMPREHEN METABOLIC PANEL: CPT

## 2023-10-12 PROCEDURE — 84402 ASSAY OF FREE TESTOSTERONE: CPT

## 2023-10-12 PROCEDURE — 84403 ASSAY OF TOTAL TESTOSTERONE: CPT

## 2023-10-13 ENCOUNTER — OFFICE VISIT (OUTPATIENT)
Dept: PHYSICAL THERAPY | Facility: MEDICAL CENTER | Age: 48
End: 2023-10-13

## 2023-10-13 DIAGNOSIS — M25.561 CHRONIC PAIN OF RIGHT KNEE: ICD-10-CM

## 2023-10-13 DIAGNOSIS — G89.29 CHRONIC PAIN OF LEFT KNEE: ICD-10-CM

## 2023-10-13 DIAGNOSIS — M25.562 CHRONIC PAIN OF LEFT KNEE: ICD-10-CM

## 2023-10-13 DIAGNOSIS — G89.29 CHRONIC PAIN OF RIGHT KNEE: ICD-10-CM

## 2023-10-13 DIAGNOSIS — S86.002D INJURY OF LEFT ACHILLES TENDON, SUBSEQUENT ENCOUNTER: Primary | ICD-10-CM

## 2023-10-13 PROCEDURE — 97110 THERAPEUTIC EXERCISES: CPT | Performed by: PHYSICAL THERAPIST

## 2023-10-13 PROCEDURE — 97140 MANUAL THERAPY 1/> REGIONS: CPT | Performed by: PHYSICAL THERAPIST

## 2023-10-13 NOTE — PROGRESS NOTES
Daily Note     Today's date: 10/13/2023  Patient name: Sarthak Anderson  : 1975  MRN: [id number]  Referring provider: Roland Hamlin, *  Dx:   Encounter Diagnosis     ICD-10-CM    1. Injury of left Achilles tendon, subsequent encounter  S86.002D       2. Chronic pain of right knee  M25.561     G89.29       3. Chronic pain of left knee  M25.562     G89.29                      Subjective: Clearence Maizes reports he went for a 20 minute walk today and felt a twinge in his Achilles      Objective: See treatment diary below      Assessment: Tolerated treatment well. Patient exhibited good technique with therapeutic exercises      Plan: Progress treatment as tolerated.        Precautions: none      Manuals 9/15 9/20 9/26 10/5 10/13        Laser L Achilles  JE JE JE JE        STM L gastroc  JE JE JE JE                                  Neuro Re-Ed                                                                                                        Ther Ex             HS stretch 30"x2 30"x2 30"x2 30"x2 30"x3        Piriformis stretch mod 30"x2 30"x2 30"x2 30"x2 30"x3        Hip flexor stretch 1/2 kneel 30"x2 30"x2 30"x2 30"x2 30"x3        gastroc stretch gr strap  30"x2 30"x2 30"x2         gastroc stretch off step bent knee 30"x2 30"x2 30"x2 30"x2         PF TB black  ecc 2x10 ecc 2x10 ecc 2x10         Heel raises  1x10 pain 3x10  3x10        bridges  2x10 2x12          Side stepping TB  20'x4 Gr 20'x4 20'x4 20'x4        SLS   ABC 1x rebounder 30x2 Rebounder 30x2        Lat step ups  x10 ea L2 Up & over L2          steamboats    3#, 5# ext, flex 3# 20ea        Heel raises 2 up 1 down    2x10 2x10        SL heel raises    2x10 2x10        Step ups     L3 2x10ea        Ther Activity                                       Gait Training                                       Modalities

## 2023-10-18 ENCOUNTER — HOSPITAL ENCOUNTER (OUTPATIENT)
Dept: RADIOLOGY | Facility: MEDICAL CENTER | Age: 48
Discharge: HOME | End: 2023-10-18
Payer: COMMERCIAL

## 2023-10-18 ENCOUNTER — OFFICE VISIT (OUTPATIENT)
Dept: PHYSICAL THERAPY | Facility: MEDICAL CENTER | Age: 48
End: 2023-10-18

## 2023-10-18 VITALS
RESPIRATION RATE: 20 BRPM | HEART RATE: 91 BPM | TEMPERATURE: 97.5 F | SYSTOLIC BLOOD PRESSURE: 123 MMHG | DIASTOLIC BLOOD PRESSURE: 80 MMHG | OXYGEN SATURATION: 97 %

## 2023-10-18 DIAGNOSIS — S86.002D INJURY OF LEFT ACHILLES TENDON, SUBSEQUENT ENCOUNTER: Primary | ICD-10-CM

## 2023-10-18 DIAGNOSIS — G89.29 CHRONIC PAIN OF LEFT KNEE: ICD-10-CM

## 2023-10-18 DIAGNOSIS — M79.18 MYOFASCIAL PAIN SYNDROME: ICD-10-CM

## 2023-10-18 DIAGNOSIS — M25.562 CHRONIC PAIN OF LEFT KNEE: ICD-10-CM

## 2023-10-18 DIAGNOSIS — G89.29 CHRONIC PAIN OF RIGHT KNEE: ICD-10-CM

## 2023-10-18 DIAGNOSIS — M25.561 CHRONIC PAIN OF RIGHT KNEE: ICD-10-CM

## 2023-10-18 PROCEDURE — 20552 NJX 1/MLT TRIGGER POINT 1/2: CPT | Performed by: PHYSICAL MEDICINE & REHABILITATION

## 2023-10-18 PROCEDURE — 97110 THERAPEUTIC EXERCISES: CPT | Performed by: PHYSICAL THERAPIST

## 2023-10-18 PROCEDURE — 97140 MANUAL THERAPY 1/> REGIONS: CPT | Performed by: PHYSICAL THERAPIST

## 2023-10-18 PROCEDURE — 77002 NEEDLE LOCALIZATION BY XRAY: CPT | Performed by: PHYSICAL MEDICINE & REHABILITATION

## 2023-10-18 RX ORDER — METHYLPREDNISOLONE ACETATE 40 MG/ML
80 INJECTION, SUSPENSION INTRA-ARTICULAR; INTRALESIONAL; INTRAMUSCULAR; PARENTERAL; SOFT TISSUE ONCE
Status: COMPLETED | OUTPATIENT
Start: 2023-10-18 | End: 2023-10-18

## 2023-10-18 RX ORDER — BUPIVACAINE HCL/PF 2.5 MG/ML
5 VIAL (ML) INJECTION ONCE
Status: COMPLETED | OUTPATIENT
Start: 2023-10-18 | End: 2023-10-18

## 2023-10-18 RX ADMIN — Medication 5 ML: at 15:41

## 2023-10-18 RX ADMIN — METHYLPREDNISOLONE ACETATE 80 MG: 40 INJECTION, SUSPENSION INTRA-ARTICULAR; INTRALESIONAL; INTRAMUSCULAR; SOFT TISSUE at 15:41

## 2023-10-18 RX ADMIN — IOHEXOL 1 ML: 300 INJECTION, SOLUTION INTRAVENOUS at 15:41

## 2023-10-18 NOTE — DISCHARGE INSTRUCTIONS
Do not apply heat to any area that is numb. If you have discomfort or soreness at the injection site, you may apply ice today, 20 minutes on and 20 minutes off. Tomorrow you may use ice or warm, moist heat. Do not apply ice or heat directly to the skin. If you experience severe shortness of breath, go to the Emergency Room. You may have numbness for several hours from the local anesthetic. Please use caution and common sense, especially with weight-bearing activities. You may have an increase or change in the discomfort for 36-48 hours after your treatment. Apply ice and continue with any pain medicine you have been prescribed. Do not do anything strenuous today. You may shower, but no tub baths or hot tubs today. You may resume your normal activities tomorrow, but do not “overdo it”. Resume normal activities slowly when you are feeling better. If you experience redness, drainage or swelling at the injection site, or if you develop a fever above 100 degrees, please call [facility] at [phone] or go to the Emergency Room. Continue to take all routine medicines prescribed by your primary care physician unless otherwise instructed by our staff. Most blood thinners should be started again according to your regularly scheduled dosing. If you have any questions, please give our office a call. As no general anesthesia was used in today's procedure, you should not experience any side effects related to anesthesia. If you have a problem specifically related to your procedure, please call our office at [phone]. Problems not related to your procedure should be directed to your primary care physician.

## 2023-10-18 NOTE — PROGRESS NOTES
Daily Note     Today's date: 10/18/2023  Patient name: Sangeeta Keith  : 1975  MRN: [id number]  Referring provider: Kleber Rice, *  Dx:   Encounter Diagnosis     ICD-10-CM    1. Injury of left Achilles tendon, subsequent encounter  S86.002D       2. Chronic pain of right knee  M25.561     G89.29       3. Chronic pain of left knee  M25.562     G89.29                      Subjective: Matias Coronel reports he has been feeling ok. Objective: See treatment diary below      Assessment: Tolerated treatment well. Patient would benefit from continued PT. Matias Coronel had pain during the 3rd set of SL heel raises and was able to complete 5 of the 10 reps      Plan: Progress treatment as tolerated.        Precautions: none      Manuals 9/15 9/20 9/26 10/5 10/13 10/18       Laser L Achilles  JE JE JE JE JE       STM L gastroc  JE JE JE JE JE                                 Neuro Re-Ed                                                                                                        Ther Ex             HS stretch 30"x2 30"x2 30"x2 30"x2 30"x3 30"x3       Piriformis stretch mod 30"x2 30"x2 30"x2 30"x2 30"x3 30"x3       Hip flexor stretch 1/2 kneel 30"x2 30"x2 30"x2 30"x2 30"x3        gastroc stretch gr strap  30"x2 30"x2 30"x2  30"x3       gastroc stretch off step bent knee 30"x2 30"x2 30"x2 30"x2         PF TB black  ecc 2x10 ecc 2x10 ecc 2x10         Heel raises  1x10 pain 3x10  3x10 3x10       bridges  2x10 2x12          Side stepping TB  20'x4 Gr 20'x4 20'x4 20'x4 20'x4       SLS   ABC 1x rebounder 30x2 Rebounder 30x2 Rebounder 30x2       Lat step ups  x10 ea L2 Up & over L2          steamboats    3#, 5# ext, flex 3# 20ea 3# 30ea       Heel raises 2 up 1 down    2x10 2x10 3x10       SL heel raises    2x10 2x10 2x10, 1x5       Step ups     L3 2x10ea L3 20ea       Ther Activity                                       Gait Training                                       Modalities

## 2023-10-18 NOTE — H&P
History of Present Illness:  The patient is a 51 y.o. 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    Depression     ED (erectile dysfunction)     History of Graves' disease     s/p Tapazole x 2 years, then radioactive iodine, which resulted in euthyroid status    Hyperlipidemia     Hypertension     Low testosterone 5/22/2023    Lumbar spondylosis 11/16/2020    Myofascial pain syndrome of lumbar spine 12/14/2020    Overweight     Vitamin D deficiency        Past Surgical History:   Procedure Laterality Date    NO PAST SURGERIES           Current Outpatient Medications:     Cholecalciferol (Vitamin D3) 125 MCG (5000 UT) TABS, Take 5,000 Units by mouth daily, Disp: , Rfl:     clomiPHENE (CLOMID) 50 mg tablet, Take 0.5 tablets (25 mg total) by mouth daily (Patient not taking: Reported on 7/3/2023), Disp: 15 tablet, Rfl: 3    [employer] FIBER GUMMIES PO, Take 2 tablets by mouth in the morning, Disp: , Rfl:     cyclobenzaprine (FLEXERIL) 10 mg tablet, Take 1 tablet (10 mg total) by mouth daily as needed for muscle spasms, Disp: 30 tablet, Rfl: 0    FLUoxetine (PROzac) 40 MG capsule, Take 1 capsule (40 mg total) by mouth daily, Disp: 30 capsule, Rfl: 1    losartan (COZAAR) 100 MG tablet, Take 1 tablet (100 mg total) by mouth daily, Disp: 90 tablet, Rfl: 2    methocarbamol (ROBAXIN) 500 mg tablet, Take 1 tablet (500 mg total) by mouth 4 (four) times a day, Disp: 60 tablet, Rfl: 2    Multiple Vitamin (multivitamin) tablet, Take 1 tablet by mouth daily, Disp: , Rfl:     tadalafil (CIALIS) 2.5 MG tablet, Take 1 tablet (2.5 mg total) by mouth daily, Disp: 90 tablet, Rfl: 2    No Known Allergies    Physical Exam:   Vitals:    10/18/23 1528   BP: 127/84   Pulse: 76   Resp: 18   Temp: 97.5 °F (36.4 °C)   SpO2: 95%     General: Awake, Alert, Oriented x 3, Mood and affect appropriate  Respiratory: Respirations even and unlabored  Cardiovascular: Peripheral pulses intact; no edema  Musculoskeletal Exam: bilateral buttock and leg pain    ASA Score: 2    Patient/Chart Verification  Patient ID Verified: Verbal  ID Band Applied: No  Consents Confirmed: Procedural, To be obtained in the Pre-Procedure area  H&P( within 30 days) Verified: To be obtained in the Pre-Procedure area  Interval H&P(within 24 hr) Complete (required for Outpatients and Surgery Admit only): To be obtained in the Pre-Procedure area  Allergies Reviewed: No  Anticoag/NSAID held?: NA  Currently on antibiotics?: No    Assessment:   1.  Myofascial pain syndrome        Plan: b/l piriformis inj

## 2023-10-20 LAB
TESTOST FREE SERPL-MCNC: 6.3 PG/ML (ref 6.8–21.5)
TESTOST SERPL-MCNC: 342 NG/DL (ref 264–916)

## 2023-10-23 ENCOUNTER — OFFICE VISIT (OUTPATIENT)
Dept: PHYSICAL THERAPY | Facility: MEDICAL CENTER | Age: 48
End: 2023-10-23
Payer: COMMERCIAL

## 2023-10-23 DIAGNOSIS — M25.561 CHRONIC PAIN OF RIGHT KNEE: ICD-10-CM

## 2023-10-23 DIAGNOSIS — G89.29 CHRONIC PAIN OF LEFT KNEE: ICD-10-CM

## 2023-10-23 DIAGNOSIS — G89.29 CHRONIC PAIN OF RIGHT KNEE: ICD-10-CM

## 2023-10-23 DIAGNOSIS — M25.562 CHRONIC PAIN OF LEFT KNEE: ICD-10-CM

## 2023-10-23 DIAGNOSIS — S86.002D INJURY OF LEFT ACHILLES TENDON, SUBSEQUENT ENCOUNTER: Primary | ICD-10-CM

## 2023-10-23 PROCEDURE — 97140 MANUAL THERAPY 1/> REGIONS: CPT | Performed by: PHYSICAL THERAPIST

## 2023-10-23 PROCEDURE — 97110 THERAPEUTIC EXERCISES: CPT | Performed by: PHYSICAL THERAPIST

## 2023-10-23 NOTE — PROGRESS NOTES
Daily Note     Today's date: 10/23/2023  Patient name: Marianna Sosa  : 1975  MRN: [id number]  Referring provider: Abhijit Mathias, *  Dx:   Encounter Diagnosis     ICD-10-CM    1. Injury of left Achilles tendon, subsequent encounter  S86.002D       2. Chronic pain of right knee  M25.561     G89.29       3. Chronic pain of left knee  M25.562     G89.29                      Subjective: Jayden Sinha reports he has been doing ok. He has not been having pain with walking around      Objective: See treatment diary below      Assessment: Tolerated treatment well. Patient exhibited good technique with therapeutic exercises      Plan: Progress treatment as tolerated.        Precautions: none      Manuals 9/15 9/20 9/26 10/5 10/13 10/18 10/23      Laser L Achilles  JE JE JE JE JE JE      STM L gastroc  JE JE JE JE JE JE                                Neuro Re-Ed                                                                                                        Ther Ex             HS stretch 30"x2 30"x2 30"x2 30"x2 30"x3 30"x3 30"x3      Piriformis stretch mod 30"x2 30"x2 30"x2 30"x2 30"x3 30"x3 30"x3      Hip flexor stretch 1/2 kneel 30"x2 30"x2 30"x2 30"x2 30"x3  30"x3      gastroc stretch gr strap  30"x2 30"x2 30"x2  30"x3       gastroc stretch off step bent knee 30"x2 30"x2 30"x2 30"x2         PF TB black  ecc 2x10 ecc 2x10 ecc 2x10         Heel raises  1x10 pain 3x10  3x10 3x10 3x10      bridges  2x10 2x12          Side stepping TB  20'x4 Gr 20'x4 20'x4 20'x4 20'x4 20'x4      SLS   ABC 1x rebounder 30x2 Rebounder 30x2 Rebounder 30x2 ABC's airex      Lat step ups  x10 ea L2 Up & over L2    15      steamboats    3#, 5# ext, flex 3# 20ea 3# 30ea       Heel raises 2 up 1 down    2x10 2x10 3x10 3x10      SL heel raises    2x10 2x10 2x10, 1x5 3x10      Step ups     L3 2x10ea L3 20ea L2 20ea      Ther Activity                                       Gait Training                                       Modalities

## 2023-10-24 ENCOUNTER — OFFICE VISIT (OUTPATIENT)
Dept: FAMILY MEDICINE CLINIC | Facility: CLINIC | Age: 48
End: 2023-10-24
Payer: COMMERCIAL

## 2023-10-24 VITALS
WEIGHT: 186 LBS | SYSTOLIC BLOOD PRESSURE: 116 MMHG | OXYGEN SATURATION: 98 % | RESPIRATION RATE: 16 BRPM | BODY MASS INDEX: 26.63 KG/M2 | TEMPERATURE: 97.9 F | HEART RATE: 77 BPM | HEIGHT: 70 IN | DIASTOLIC BLOOD PRESSURE: 64 MMHG

## 2023-10-24 DIAGNOSIS — E66.3 OVERWEIGHT: ICD-10-CM

## 2023-10-24 DIAGNOSIS — M53.3 COCCYDYNIA: ICD-10-CM

## 2023-10-24 DIAGNOSIS — E78.5 HYPERLIPIDEMIA, UNSPECIFIED HYPERLIPIDEMIA TYPE: ICD-10-CM

## 2023-10-24 DIAGNOSIS — E55.9 VITAMIN D DEFICIENCY: ICD-10-CM

## 2023-10-24 DIAGNOSIS — M47.816 LUMBAR SPONDYLOSIS: ICD-10-CM

## 2023-10-24 DIAGNOSIS — R79.89 LOW TESTOSTERONE: ICD-10-CM

## 2023-10-24 DIAGNOSIS — N52.8 OTHER MALE ERECTILE DYSFUNCTION: ICD-10-CM

## 2023-10-24 DIAGNOSIS — Z13.1 SCREENING FOR DIABETES MELLITUS: ICD-10-CM

## 2023-10-24 DIAGNOSIS — I10 PRIMARY HYPERTENSION: ICD-10-CM

## 2023-10-24 DIAGNOSIS — F32.0 CURRENT MILD EPISODE OF MAJOR DEPRESSIVE DISORDER, UNSPECIFIED WHETHER RECURRENT (HCC): ICD-10-CM

## 2023-10-24 DIAGNOSIS — Z78.9 FALSE POSITIVE HIV SEROLOGY: ICD-10-CM

## 2023-10-24 DIAGNOSIS — F41.9 ANXIETY: Primary | ICD-10-CM

## 2023-10-24 DIAGNOSIS — M79.18 MYOFASCIAL PAIN SYNDROME: ICD-10-CM

## 2023-10-24 DIAGNOSIS — Z13.6 SCREENING FOR CARDIOVASCULAR CONDITION: ICD-10-CM

## 2023-10-24 DIAGNOSIS — Q76.49 BERTOLOTTI'S SYNDROME: ICD-10-CM

## 2023-10-24 PROCEDURE — 99214 OFFICE O/P EST MOD 30 MIN: CPT | Performed by: FAMILY MEDICINE

## 2023-10-24 RX ORDER — LOSARTAN POTASSIUM 100 MG/1
100 TABLET ORAL DAILY
Qty: 90 TABLET | Refills: 2 | Status: SHIPPED | OUTPATIENT
Start: 2023-10-24

## 2023-10-24 RX ORDER — FLUOXETINE HYDROCHLORIDE 40 MG/1
40 CAPSULE ORAL DAILY
Qty: 90 CAPSULE | Refills: 2 | Status: SHIPPED | OUTPATIENT
Start: 2023-10-24

## 2023-10-24 NOTE — PROGRESS NOTES
Assessment/Plan:  Problem List Items Addressed This Visit        Cardiovascular and Mediastinum    Hypertension     Stable. Check blood pressure outside of office. Recommend lifestyle modifications. Relevant Medications    losartan (COZAAR) 100 MG tablet    Other Relevant Orders    CBC and differential    Comprehensive metabolic panel       Nervous and Auditory    Bertolotti's syndrome     Management per Pain Management. Musculoskeletal and Integument    Lumbar spondylosis     Management per Pain Management. Myofascial pain syndrome     Management per Pain Management. Other    Anxiety - Primary     Improved. Continue Prozac 40mg 1 pill daily. Continue counseling. Relevant Medications    FLUoxetine (PROzac) 40 MG capsule    Other Relevant Orders    TSH, 3rd generation with Free T4 reflex    Depression     Improved. Continue Prozac 40mg 1 pill daily. Continue counseling. Relevant Medications    FLUoxetine (PROzac) 40 MG capsule    Other Relevant Orders    TSH, 3rd generation with Free T4 reflex    Hyperlipidemia     Stable s statin. Recommend lifestyle modifications. The 10-year ASCVD risk score (Dee KING, et al., 2019) is: 3.2%    Values used to calculate the score:      Age: 51 years      Sex: Male      Is Non- : No      Diabetic: No      Tobacco smoker: No      Systolic Blood Pressure: 925 mmHg      Is BP treated: Yes      HDL Cholesterol: 37 mg/dL      Total Cholesterol: 165 mg/dL           Relevant Orders    CBC and differential    Comprehensive metabolic panel    Lipid panel    TSH, 3rd generation with Free T4 reflex    LDL cholesterol, direct    Overweight     Recommend lifestyle modifications. Relevant Orders    TSH, 3rd generation with Free T4 reflex    ED (erectile dysfunction)     Continue Cialis 2.5mg QD.   Patient unable to afford Clomid initiation per Uro, which may help increase Testosterone levels. Vitamin D deficiency     Stable on MVI and OTC Vitamin D 5,000IU daily. Relevant Orders    Comprehensive metabolic panel    Vitamin D 25 hydroxy    Coccydynia     Management per Pain Management. Low testosterone     Unable to affod Clomid initiation per Uro, which may help increase Testosterone levels. He will call Uro to discuss testosterone replacement options. Other Visit Diagnoses     Screening for diabetes mellitus        Relevant Orders    Hemoglobin A1C    Screening for cardiovascular condition        Relevant Orders    CBC and differential    Comprehensive metabolic panel    Lipid panel    False positive HIV serology        Relevant Orders    HIV-1 RNA, Quantitative PCR, [facility]             Return in about 4 months (around 2/24/2024) for Physical / 4mo - HTN, HL, Anx/Dep, LBP, ED, Labs. Future Appointments   Date Time Provider [address]   11/1/2023  2:30 PM [address] [place] [address], [address] [facility] END   11/10/2023  9:00 AM [facility]   2/21/2024 11:20 AM Yecenia Santiago DO [facility]        Subjective:     Laya Wilkinson is a 51 y.o. male who presents today for a follow-up on his chronic medical conditions. HPI:  Chief Complaint   Patient presents with   • Follow-up     6 week F/U HTN, HL, Anx/Dep, LBP, ED, Labs. No new problems or concerns at this time. • HM     Flu shot at later date, cannot get it at this time due to a recent pain injection. No additional covid vaccines. -- Above per clinical staff and reviewed. --      HPI      Today:      Return in about 6 weeks (around 10/19/2023) for F/U HTN, HL, Anx/Dep, LBP, ED, Labs. B/L Knee Pain - Management per Ortho Dr. Kendra Jauregui - next appt PRN. Attending PT. Symptoms x intermittently x 1 year. Under the knee cap, intermittent pain, worse c traveling on stairs. No pain c walking or running. Denies locking, popping, or giving way. Denies trauma. Left Medial Foot Pain - Management per Podiatry Dr. Priyanka Rodriguez - next appt 11/23. Symptoms x couple months. Concerned about bunion. Got new fitted running shoes s benefit. Has pain at left bunion c running. Left Achilles Pain -  Management per Podiatry Dr. Priyanka Rodriguez - next appt 11/23. Symptoms x 4  Occurs c running. Using ice and rest c benefit. Overweight - Management per Exercise Nutritionist Galileo Cardenas - next appt 12/23 - Watching diet. He cut back on drinking soda and fast food. Counting calories with lose it sheri. Drinking 2 small sodas daily. Exercise - Not Running, Jujitsu for 1 hour, 2-3 times per week,  Not Weightlifting for 1 hour, 2 times per week. HTN - On Losartan 100mg QD. No other HTN meds previously. BP check outside of office on arm cuff 128/74. Hyperlipidemia - No statin previously. H/O Grave's Disease / Hyperthyroidism - s/p Tapazole x 2 years, then radioactive iodine, which resulted in euthyroid status. Anxiety / Depression - On increased Prozac 40mg QD x 6 weeks. Feels mood is improved 80% and stable. Feels like his mood is more level. D/C increased Effexor XR 225mg QD due to nightmares and inefficacy. D/C Wellbutrin XL 300mg QD in AM and Cymbalta 90mg QD. Mood is unchanged and he feels flat. Mood is "not great' - since summer 2022 - increased stress at work and in marriage. He feels "life is heavy," mind races, not sleeping well, is unmotivated. He feels Wellbutrin is not working, but cannot specifically state why. Previous Management per Psych at [address] - last seen 2022  - F/U PRN as was D/C to PCP Management. On Wellbutrin XL 300mg QD in AM since 9/22. Previously on Zoloft, Prozac - did fine with these meds, Lexapro 20mg QD ineffective. No other mood meds or higher dose previously. Sees counseling q2 weeks  - (no longer attending marriage counseling with wife), and individual - helpful. Next appt 11/23.   Poor social supports. No SI/HI/AH/VH. PHQ-2/9 Depression Screening    Little interest or pleasure in doing things: 0 - not at all  Feeling down, depressed, or hopeless: 0 - not at all  Trouble falling or staying asleep, or sleeping too much: 0 - not at all  Feeling tired or having little energy: 0 - not at all  Poor appetite or overeatin - not at all  Feeling bad about yourself - or that you are a failure or have let yourself or your family down: 0 - not at all  Trouble concentrating on things, such as reading the newspaper or watching television: 0 - not at all  Moving or speaking so slowly that other people could have noticed. Or the opposite - being so fidgety or restless that you have been moving around a lot more than usual: 0 - not at all  Thoughts that you would be better off dead, or of hurting yourself in some way: 0 - not at all  PHQ-9 Score: 0   PHQ-9 Interpretation: No or Minimal depression        CORA-7 Flowsheet Screening    Flowsheet Row Most Recent Value   Over the last 2 weeks, how often have you been bothered by any of the following problems? Feeling nervous, anxious, or on edge 0   Not being able to stop or control worrying 0   Worrying too much about different things 0   Trouble relaxing 0   Being so restless that it is hard to sit still 0   Becoming easily annoyed or irritable 0   Feeling afraid as if something awful might happen 0   CORA-7 Total Score 0             ED - Management per Uro Dr. Michelle Boogie. Next appt  s/p hormonal labs - overdue. On Cialis 2.5mg QD - helpful. Patient feels due to Lexapro and Wellbutrin. D/C Viagra 50mg QD PRN - helpful, but desired daily Rx. No other ED meds or higher doses previously. Chronic Lower Back Pain / Lumbar Spondylosis / Myofascial Pain Syndrome of Lumbar Spine / Bertolotti's Syndrome / Coccydynia  - Management per pain Management Dr. Arash Santamaria. Next appt?  s/p B/L Piriformis infection 10/108/23 s benefit.   S/p B/L SI joint injection 23 - worse symptoms today. s/p 6/5/23 for ALEC s benefit. Management per Dre Haskins - next appt PRN. Has daily spasms on medial sacrum, radiates into B/L buttocks. Injured his back in 2015 during  training in [place]. He is using Flexeril 10mg 1/2 -1 daily PRN - needing to use 1/2 pill daily as spasms are worsening, taking 2-3 times per week when not working. Previously seen [address] [facility] 2021. S/p ALEC s benefit. Last MRI in [place] in [id number]. Surgery was not advised per advised. Last PT in [place] 2018 s benefit. He is attending PT 2 days per week and doing HEP, but causes pain. Vitamin D Deficiency - No Drisdol. Taking MVI and OTC Vitamin D 5,000IU daily. Low Testosterone - Management per Uro Dr. Matthew Altman. Next appt 8/23 s/p hormonal labs - pt cancelled appt due to inability to afford Clomid and declination of Testosterone cream due to risk of prostate cancer. H/O False Positive HIV test - s/p [facility] e-consult per Dr. Prince Rivers 10/7/22, who advised ID F/U vs. PCP repeat the testing at three months, six months and one year (completed 10/12/23), and include HIV PCR testing with the routine screening if it does not already reflex that way. Normal colonoscopy 11/12/20 by GI Dr. Sheila Hatfield @ [facility]. Repeat in 10 years. From previous note:    Return in about 6 weeks (around 8/14/2023) for F/U HTN, HL, Anx/Dep, LBP, ED, Labs     B/L Knee Pain - Symptoms x intermittently x 1 year. Under the knee cap, intermittent pain, worse c traveling on stairs. No pain c walking or running. Denies locking, popping, or giving way. Denies trauma. Left Medial Foot Pain - Symptoms x couple months. Concerned about bunion. Got new fitted running shoes s benefit. Has pain at left bunion c running. Left Achilles Pain - Symptoms x 4  Occurs c running. Using ice and rest c benefit.       Overweight - s/p Exercise Nutritionist Jimmy Allison consult 5/3/23 - next appt 8/23 - overdue, plans to return in the future. Watching diet. He cut back on drinking soda and fast food. Counting calories with lose it sheri. Drinking 2 small sodas daily. Exercise - Running, Jujitsu,  Weightlifting for 1 hour, 2 times per week. HTN - On Losartan 100mg QD. No other HTN meds previously. No BP check outside of office. Hyperlipidemia - No statin previously. H/O Grave's Disease / Hyperthyroidism - s/p Tapazole x 2 years, then radioactive iodine, which resulted in euthyroid status. Anxiety / Depression - On Prozac 20mg QD x 6 weeks. Feels mood is improved 50% and stable. D/C increased Effexor XR 225mg QD due to nightmares and inefficacy. D/C Wellbutrin XL 300mg QD in AM and Cymbalta 90mg QD. Mood is unchanged and he feels flat. Mood is "not great' - since summer 2022 - increased stress at work and in marriage. He feels "life is heavy," mind races, not sleeping well, is unmotivated. He feels Wellbutrin is not working, but cannot specifically state why. Previous Management per Psych at [address] - last seen   - F/U PRN as was D/C to PCP Management. On Wellbutrin XL 300mg QD in AM since . Previously on Zoloft, Prozac - did fine with these meds, Lexapro 20mg QD ineffective. No other mood meds or higher dose previously. Sees counseling q2 weeks - marriage counseling with wife, and individual - helpful. Next appt . Poor social supports. No SI/HI/AH/VH.           PHQ-2/9 Depression Screening       Little interest or pleasure in doing things: 1 - several days  Feeling down, depressed, or hopeless: 1 - several days  Trouble falling or staying asleep, or sleeping too much: 1 - several days  Feeling tired or having little energy: 1 - several days  Poor appetite or overeatin - not at all  Feeling bad about yourself - or that you are a failure or have let yourself or your family down: 1 - several days  Trouble concentrating on things, such as reading the newspaper or watching television: 0 - not at all  Moving or speaking so slowly that other people could have noticed. Or the opposite - being so fidgety or restless that you have been moving around a lot more than usual: 0 - not at all  Thoughts that you would be better off dead, or of hurting yourself in some way: 0 - not at all  PHQ-9 Score: 5   PHQ-9 Interpretation: Mild depression                    CORA-7 Flowsheet Screening    Flowsheet Row Most Recent Value   Over the last 2 weeks, how often have you been bothered by any of the following problems? Feeling nervous, anxious, or on edge 1   Not being able to stop or control worrying 1   Worrying too much about different things 1   Trouble relaxing 1   Being so restless that it is hard to sit still 0   Becoming easily annoyed or irritable 1   Feeling afraid as if something awful might happen 0   CORA-7 Total Score 5             ED - Management per Uro Dr. Pamela Hart. Next appt 8/23 s/p hormonal labs - overdue. On Cialis 2.5mg QD - helpful. Patient feels due to Lexapro and Wellbutrin. D/C Viagra 50mg QD PRN - helpful, but desired daily Rx. No other ED meds or higher doses previously. Chronic Lower Back Pain / Lumbar Spondylosis / Myofascial Pain Syndrome of Lumbar Spine / Bertolotti's Syndrome / Coccydynia  - Management per pain Management Dr. Fontaine Gaucher. Next appt? S/p B/L SI joint injection 9/6/23 - worse symptoms today. s/p 6/5/23 for ALEC s benefit. Management per Jaimee Alvarez - next appt PRN. Has daily spasms on medial sacrum, radiates into B/L buttocks. Injured his back in 2015 during  training in [place]. He is using Flexeril 10mg 1/2 -1 daily PRN - needing to use 1/2 pill daily as spasms are worsening, taking 2-3 times per week when not working. Previously seen [address] [facility] 2021. S/p ALEC s benefit. Last MRI in [place] in [id number]. Surgery was not advised per advised. Last PT in [place] 2018 s benefit.   He is attending PT 2 days per week and doing HEP, but causes pain. Vitamin D Deficiency - No Drisdol. Taking MVI and OTC Vitamin D 5,000IU daily. Low Testosterone - Management per Uro Dr. Pinky Lesch. Next appt 8/23 s/p hormonal labs - pt cancelled appt due to inability to afford Clomid and declination of Testosterone cream due to risk of prostate cancer. H/O False Positive HIV test - s/p [facility] e-consult per Dr. Ghanshyam Conley 10/7/22, who advised ID F/U vs. PCP repeat the testing at three months, six months and one year, and include HIV PCR testing with the routine screening if it does not already reflex that way. Normal colonoscopy 11/12/20 by GI Dr. Tigre Figueroa @ [facility]. Repeat in 10 years. From previous note:     Return in about 6 weeks (around 7/3/2023) for F/U HTN, HL, Anx/Dep, LBP, ED, Labs. Return in 6 weeks (on 5/23/2023) for F/U HTN, HL, Anx/Dep, LBP, ED, Labs. Patient did not complete labs 5/22/23. Overweight - s/p Exercise Nutritionist [employer] consult 5/3/23 - next appt 8/23. Watching diet. He cut back on drinking soda and fast food. Counting calories with lose it sheri. Drinking 0-2 small sodas daily. +Exercise - Weightlifting for 1 hour, 2 times per week. He would like to resume running. HTN - On Losartan 100mg QD. No other HTN meds previously. No BP check outside of office. Hyperlipidemia - No statin previously. H/O Grave's Disease / Hyperthyroidism - s/p Tapazole x 2 years, then radioactive iodine, which resulted in euthyroid status. Anxiety / Depression - On increased Effexor XR 150mg QD x 6 weeks. D/C Wellbutrin XL 300mg QD in AM and Cymbalta 90mg QD. Mood is unchanged and he feels flat. Mood is "not great' - since summer 2022 - increased stress at work and in marriage. He feels "life is heavy," mind races, not sleeping well, is unmotivated. He feels Wellbutrin is not working, but cannot specifically state why.   Previous Management per Psych at [address] last seen   - F/U PRN as was D/C to PCP Management. On Wellbutrin XL 300mg QD in AM since . Previously on Zoloft, Prozac - did fine with these meds, Lexapro 20mg QD ineffective. No other mood meds or higher dose previously. Sees counseling q2 weeks - marriage counseling with wife, and individual - helpful. Next appt . Poor social supports. No SI/HI/AH/VH. PHQ-2/9 Depression Screening       Little interest or pleasure in doing things: 1 - several days  Feeling down, depressed, or hopeless: 1 - several days  Trouble falling or staying asleep, or sleeping too much: 1 - several days  Feeling tired or having little energy: 1 - several days  Poor appetite or overeatin - not at all  Feeling bad about yourself - or that you are a failure or have let yourself or your family down: 1 - several days  Trouble concentrating on things, such as reading the newspaper or watching television: 0 - not at all  Moving or speaking so slowly that other people could have noticed. Or the opposite - being so fidgety or restless that you have been moving around a lot more than usual: 0 - not at all  Thoughts that you would be better off dead, or of hurting yourself in some way: 0 - not at all  PHQ-9 Score: 5   PHQ-9 Interpretation: Mild depression                   CORA-7 Flowsheet Screening    Flowsheet Row Most Recent Value   Over the last 2 weeks, how often have you been bothered by any of the following problems? Feeling nervous, anxious, or on edge 0   Not being able to stop or control worrying 1   Worrying too much about different things 1   Trouble relaxing 1   Being so restless that it is hard to sit still 0   Becoming easily annoyed or irritable 1   Feeling afraid as if something awful might happen 0   CORA-7 Total Score 4             ED - Management per Uro Dr. Margo Bustos. Next appt  s/p hormonal labs. On Cialis 2.5mg QD - helpful. Patient feels due to Lexapro and Wellbutrin.   D/C Viagra 50mg QD PRN - helpful, but desired daily Rx. No other ED meds or higher doses previously. Chronic Lower Back Pain / Lumbar Spondylosis / Myofascial Pain Syndrome of Lumbar Spine / Bertolotti's Syndrome / Coccydynia  - Management per pain Management Dr. Onel Jarrell. Next appt 7/23, s/p 6/5/23 for ALEC s benefit. Management per Isabel Mora Dr. 1 [facility] - next appt PRN. On Effexor XR 150mg QD x 6 weeks. No change in back pain. Has daily spasms on medial sacrum, radiates into B/L buttocks. Injured his back in 2015 during  training in [place]. He is using Flexeril 10mg 1/2 -1 daily PRN - needing to use 1/2 pill daily as spasms are worsening, taking 2-3 times per week when not working. Previously seen [address] [facility] 2021. S/p ALEC s benefit. Last MRI in [place] in [id number]. Surgery was not advised per advised. Last PT in [place] 2018 s benefit. He is attending PT 2 days per week and doing HEP, but causes pain. Vitamin D Deficiency - No Drisdol. Taking MVI and OTC Vitamin D 5,000IU daily. Low Testosterone - Management per Uro Dr. Edgardo Guillaume. Next appt 8/23 s/p hormonal labs. H/O False Positive HIV test - s/p [facility] e-consult per Dr. Noah Morfin 10/7/22, who advised ID F/U vs. PCP repeat the testing at three months, six months and one year, and include HIV PCR testing with the routine screening if it does not already reflex that way. Normal colonoscopy 11/12/20 by GI Dr. Stephani Darby @ [facility]. Repeat in 10 years. From previous note:     Return in 6 weeks (on 5/23/2023) for F/U HTN, HL, Anx/Dep, LBP, ED, Labs. PTO c Meagan        Overweight - Pending Exercise Nutritionist - Pending 5/22/23. Watching diet. He cut back on drinking soda and fast food. +Exercise - Weightlifting for 1 hour, 2 times per week. He would like to resume running. HTN - On Losartan 100mg QD. No other HTN meds previously. No BP check outside of office. Hyperlipidemia - No statin previously.      H/O Grave's Disease / Hyperthyroidism - s/p Tapazole x 2 years, then radioactive iodine, which resulted in euthyroid status. Anxiety / Depression - On Effexor XR 75mg QD x 6 weeks. D/C Wellbutrin XL 300mg QD in AM and Cymbalta 90mg QD x 6 weeks as ineffective. Mood is unchanged and is "not great' - since summer 2022 - increased stress at work and in marriage. He feels "life is heavy," mind races, not sleeping well, is unmotivated. He feels Wellbutrin is not working, but cannot specifically state why. Previous Management per Psych at [address] - last seen   - F/U PRN as was D/C to PCP Management. On Wellbutrin XL 300mg QD in AM since . Previously on Zoloft, Prozac - did fine with these meds, Lexapro 20mg QD ineffective. No other mood meds or higher dose previously. Sees counseling q2 weeks - marriage counseling with wife, and individual - helpful. Next appt . Poor social supports. No SI/HI/AH/VH. PHQ-2/9 Depression Screening       Little interest or pleasure in doing things: 1 - several days  Feeling down, depressed, or hopeless: 2 - more than half the days  Trouble falling or staying asleep, or sleeping too much: 2 - more than half the days  Feeling tired or having little energy: 2 - more than half the days  Poor appetite or overeatin - several days  Feeling bad about yourself - or that you are a failure or have let yourself or your family down: 1 - several days  Trouble concentrating on things, such as reading the newspaper or watching television: 0 - not at all  Moving or speaking so slowly that other people could have noticed.  Or the opposite - being so fidgety or restless that you have been moving around a lot more than usual: 0 - not at all  Thoughts that you would be better off dead, or of hurting yourself in some way: 0 - not at all  PHQ-9 Score: 9   PHQ-9 Interpretation: Mild depression                   CORA-7 Flowsheet Screening    Flowsheet Row Most Recent Value   Over the last 2 weeks, how often have you been bothered by any of the following problems? Feeling nervous, anxious, or on edge 1   Not being able to stop or control worrying 1   Worrying too much about different things 1   Trouble relaxing 1   Being so restless that it is hard to sit still 0   Becoming easily annoyed or irritable 1   Feeling afraid as if something awful might happen 0   CORA-7 Total Score 5             ED - Management per Uro Dr. Samson Gray. Next appt 6/23 s/p hormonal labs. On Cialis 2.5mg QD - helpful. Patient feels due to Lexapro and Wellbutrin. D/C Viagra 50mg QD PRN - helpful, but desired daily Rx. No other ED meds or higher doses previously. Chronic Lower Back Pain / Lumbar Spondylosis / Myofascial Pain Syndrome of Lumbar Spine / Bertolotti's Syndrome / Coccydynia  - Management per pain Management Dr. Meghan Luke. Next appt 6/5/23 for ALEC. Management per Asher Anaya - next appt PRN. On Effexor XR 75mg QD x 6 weeks. No change in back pain. Has daily spasms on medial sacrum, radiates into B/L buttocks. Injured his back in 2015 during  training in [place]. He is using Flexeril 10mg 1/2 -1 daily PRN - needing to use 1/2 pill daily as spasms are worsening, previously taking 3-4 times per week. Previously seen [address] [facility] 2021. S/p ALEC s benefit. Last MRI in [place] in [id number]. Surgery was not advised per advised. Last PT in [place] 2018 s benefit. He is attending PT 2 days per week and doing HEP, but causes pain. Vitamin D Deficiency - No Drisdol. Taking MVI and OTC Vitamin D 5,000IU daily. Low Testosterone - Management per Uro Dr. Samson Gray. Next appt 6/23 s/p hormonal labs. H/O False Positive HIV test - s/p [facility] e-consult per Dr. Hever Bennett 10/7/22, who advised ID F/U vs. PCP repeat the testing at three months, six months and one year, and include HIV PCR testing with the routine screening if it does not already reflex that way.       Normal colonoscopy 11/12/20 by GRAYSON Ledesma @ [facility] [place]. Repeat in 10 years. From previous note:        Return in 6 weeks (on 2023) for F/U HTN, HL, Anx/Dep, LBP, ED, Labs. Overweight - Would like to see Nutritionist - Pending 5/3/23. Watching diet. He cut back on drinking soda and fast food. +Exercise - [employer] for 1 hour, 2-3 times per week. HTN - On Losartan 100mg QD. No other HTN meds previously. No BP check outside of office. Hyperlipidemia - No statin previously. H/O Grave's Disease / Hyperthyroidism - s/p Tapazole x 2 years, then radioactive iodine, which resulted in euthyroid status. Anxiety / Depression - On increased Cymbalta 90mg QD x 6 weeks. Mood is unchanged and is "not great' - since summer 2022 - increased stress at work and in marriage. He feels "life is heavy," mind races, not sleeping well, is unmotivated. He feels Wellbutrin is not working, but cannot specifically state why. Previous Management per Psych at [address] - last seen   - F/U PRN as was D/C to PCP Management. On Wellbutrin XL 300mg QD in AM since . Previously on Zoloft, Prozac - did fine with these meds, Lexapro 20mg QD ineffective. No other mood meds or higher dose previously. Sees counseling q2 weeks - marriage counseling with wife, and individual - helpful. Next appt . Poor social supports. No SI/HI/AH/VH.        PHQ-2/9 Depression Screening       Little interest or pleasure in doing things: 1 - several days  Feeling down, depressed, or hopeless: 1 - several days  Trouble falling or staying asleep, or sleeping too much: 2 - more than half the days  Feeling tired or having little energy: 2 - more than half the days  Poor appetite or overeatin - not at all  Feeling bad about yourself - or that you are a failure or have let yourself or your family down: 1 - several days  Trouble concentrating on things, such as reading the newspaper or watching television: 0 - not at all  Moving or speaking so slowly that other people could have noticed. Or the opposite - being so fidgety or restless that you have been moving around a lot more than usual: 0 - not at all  Thoughts that you would be better off dead, or of hurting yourself in some way: 0 - not at all  PHQ-9 Score: 7   PHQ-9 Interpretation: Mild depression                      CORA-7 Flowsheet Screening    Flowsheet Row Most Recent Value   Over the last 2 weeks, how often have you been bothered by any of the following problems? Feeling nervous, anxious, or on edge 1   Not being able to stop or control worrying 1   Worrying too much about different things 1   Trouble relaxing 1   Being so restless that it is hard to sit still 0   Becoming easily annoyed or irritable 1   Feeling afraid as if something awful might happen 0   CORA-7 Total Score 5                ED - On Cialis 2.5mg QD - helpful. Patient feels due to Lexapro and Wellbutrin. D/C Viagra 50mg QD PRN - helpful, but desired daily Rx. No other ED meds or higher doses previously. 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 No change in back pain. Has daily spasms on medial sacrum, radiates into B/L buttocks. Injured his back in 2015 during  training in [place]. He is using Flexeril 10mg 1/2 -1 daily PRN - needing to use 1/2 pill daily as spasms are worsening, previously taking 3-4 times per week. [place] [facility] [place] S/p ALEC s benefit. Last MRI in [place] in [id number]. Surgery was not advised per advised. Last PT in [place] 2018 s benefit. Vitamin D Deficiency - No Drisdol. Taking MVI and OTC Vitamin D 5,000IU daily. Low Testosterone - Pending Uro consult Dr. Danni Snyder 5/23.      H/O False Positive HIV test - s/p [facility] e-consult per Dr. Fercho Galeana 10/7/22, who advised ID F/U vs. PCP repeat the testing at three months, six months and one year, and include HIV PCR testing with the routine screening if it does not already reflex that way. Normal colonoscopy 20 by GI Dr. Christen Alvarez @ [facility]. Repeat in 10 years. From previous note:     Return in about 6 weeks (around 2023) for F/U HTN, HL, Anx/Dep, ED, LBP, Labs. Patient did not complete labs 2023. Overweight - Would like to see Nutritionist - Pending 5/3/23. Trying to watch diet. He cut back on drinking soda, but eats fast food. +Exercise - [employer] for 1 hour, 2-3 times per week. HTN - On Losartan 100mg QD. No other HTN meds previously. No BP check outside of office. Hyperlipidemia - No statin previously. H/O  Grave's Disease / Hyperthyroidism - s/p Tapazole x 2 years, then radioactive iodine, which resulted in euthyroid status. Anxiety / Depression - On Cymbalta 60mg QD x 6 weeks. Mood is unchanged and is "not great' - since summer 2022 - increased stress at work and in marriage. Previous Management per Psych at [address] - last seen   - F/U PRN as was D/C to PCP Management. On Wellbutrin XL 300mg QD in AM since . Previously on Zoloft, Prozac - did fine with these meds, Lexapro 20mg QD ineffective. No other mood meds or higher dose previously. Sees counseling q2 weeks - marriage counseling with wife, and individual - helpful. Next appt . Poor social supports. No SI/HI/AH/VH.           PHQ-2/9 Depression Screening       Little interest or pleasure in doing things: 1 - several days  Feeling down, depressed, or hopeless: 1 - several days  Trouble falling or staying asleep, or sleeping too much: 1 - several days  Feeling tired or having little energy: 2 - more than half the days  Poor appetite or overeatin - several days  Feeling bad about yourself - or that you are a failure or have let yourself or your family down: 1 - several days  Trouble concentrating on things, such as reading the newspaper or watching television: 0 - not at all  Moving or speaking so slowly that other people could have noticed. Or the opposite - being so fidgety or restless that you have been moving around a lot more than usual: 0 - not at all  Thoughts that you would be better off dead, or of hurting yourself in some way: 0 - not at all  PHQ-9 Score: 7   PHQ-9 Interpretation: Mild depression                      CORA-7 Flowsheet Screening    Flowsheet Row Most Recent Value   Over the last 2 weeks, how often have you been bothered by any of the following problems? Feeling nervous, anxious, or on edge 0   Not being able to stop or control worrying 0   Worrying too much about different things 0   Trouble relaxing 1   Being so restless that it is hard to sit still 0   Becoming easily annoyed or irritable 0   Feeling afraid as if something awful might happen 0   CORA-7 Total Score 1             ED - On Cialis 2.5mg QD x 6 weeks - helpful. Patient feels due to Lexapro and Wellbutrin. D/C Viagra 50mg QD PRN - helpful, but desired daily Rx. Patient is interested in daily Cialis. He is unsure if Rx is covered by insurance. No other ED meds or higher doses previously. 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 No change in back pain. Has daily spasms on medial sacrum, radiates into B/L buttocks. Injured his back in 2015 during  training in [place]. He is using Flexeril 10mg 1/2 -1 daily PRN - taking 3-4 times per week. Previously seen [address] [place] [address] 2021. S/p ALEC s benefit. Last MRI in [place] in [id number]. Surgery was not advised per advised. Last PT in [place] 2018 s benefit. Vitamin D Deficiency - No Drisdol. Taking MVI, but taking OTC Vitamin D 5,000IU daily.        H/O False Positive HIV test - s/p [facility] e-consult per Dr. José Antonio Pierce 10/7/22, who advised ID F/U vs. PCP repeat the testing at three months, six months and one year, and include HIV PCR testing with the routine screening if it does not already reflex that way. Normal colonoscopy 20 by GI Dr. Morgan Alford @ [facility]. Repeat in 10 years. From previous note:     Controlled Substance Review     [place] PDMP or [place]  reviewed: No red flags were identified; safe to proceed with prescription. Hair Loss - Wants to know if Nutrafol is safe. He had not tried Rogaine yet. Overweight - Would like to see Nutritionist.  Not watching diet. Drinks soda, eats fast food. +Exercise - [employer] for 1 hours 2-3 times per week. HTN - On Losartan 100mg QD. No other HTN meds previously. No BP check outside of office. Hyperlipidemia - No statin previously. H/O  Grave's Disease / Hyperthyroidism - s/p Tapazole x 2 years, then radioactive iodine, which resulted in euthyroid status. Anxiety / Depression - Mood is "not great' - since summer 2022 - increased stress at work and in marriage. Previous Management per Psych at [address] - last seen   - F/U PRN as was D/C to PCP Management. On Lexapro 20mg QD since  and Wellbutrin XL 300mg QD in AM since . Previously on Zoloft, Prozac - did fine with these meds. No other mood meds or higher dose previously. Sees counseling q2 weeks. Next appt .         PHQ-2/9 Depression Screening       Little interest or pleasure in doing things: 2 - more than half the days  Feeling down, depressed, or hopeless: 2 - more than half the days  Trouble falling or staying asleep, or sleeping too much: 2 - more than half the days  Feeling tired or having little energy: 2 - more than half the days  Poor appetite or overeatin - not at all  Feeling bad about yourself - or that you are a failure or have let yourself or your family down: 1 - several days  Trouble concentrating on things, such as reading the newspaper or watching television: 0 - not at all  Moving or speaking so slowly that other people could have noticed. Or the opposite - being so fidgety or restless that you have been moving around a lot more than usual: 0 - not at all  Thoughts that you would be better off dead, or of hurting yourself in some way: 0 - not at all  PHQ-2 Score: 4  PHQ-2 Interpretation: POSITIVE depression screen  PHQ-9 Score: 9   PHQ-9 Interpretation: Mild depression                      CORA-7 Flowsheet Screening    Flowsheet Row Most Recent Value   Over the last 2 weeks, how often have you been bothered by any of the following problems? Feeling nervous, anxious, or on edge 1   Not being able to stop or control worrying 2   Worrying too much about different things 2   Trouble relaxing 2   Being so restless that it is hard to sit still 0   Becoming easily annoyed or irritable 1   Feeling afraid as if something awful might happen 0   CORA-7 Total Score 8          MDQ:  0, Asynchronous, No Problem     ED - Patient feels due to Lexapro and Wellbutrin. On Viagra 50mg QD PRN - helpful. Patient is interested in daily Cialis. He is unsure if Rx is covered by insurance. No other ED meds or higher doses previously. 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 Injured his back in 2015 during  training in [place]. He is using Flexeril 10mg 1/2 -1 daily PRN - taking 3-4 times per week. Previously seen [facility] [place]. S/p ALEC s benefit. Last MRI in [place] in [id number]. Surgery was not advised per advised. Last PT in [place] 2018 s benefit. Vitamin D Deficiency - No Drisdol. Not taking MVI, but taking OTC Vitamin D 5,000IU daily. H/O False Positive HIV test - s/p [facility] e-consult per Dr. Noah Morfin 10/7/22, who advised ID F/U vs. PCP repeat the testing at three months, six months and one year, and include HIV PCR testing with the routine screening if it does not already reflex that way. Normal colonoscopy 11/12/20 by GI Dr. Angy Fried @ [facility]. Repeat in 10 years. Reviewed:  Labs 10/12/23, Pain Management 10/5/23, Nutrition 5/3/23, Uro 5/9/23, Ortho 9/11/23     Sees Dentist q6 months. Sees Optho q2 years. The following portions of the patient's history were reviewed and updated as appropriate: allergies, current medications, past family history, past medical history, past social history, past surgical history and problem list.      Review of Systems   Constitutional:  Negative for appetite change, chills, diaphoresis, fatigue and fever. Respiratory:  Negative for chest tightness and shortness of breath. Cardiovascular:  Negative for chest pain. Gastrointestinal:  Negative for abdominal pain, blood in stool, diarrhea, nausea and vomiting. Genitourinary:  Negative for dysuria. Musculoskeletal:  Positive for back pain. Current Outpatient Medications   Medication Sig Dispense Refill   • Cholecalciferol (Vitamin D3) 125 MCG (5000 UT) TABS Take 5,000 Units by mouth daily     • [employer] FIBER GUMMIES PO Take 2 tablets by mouth in the morning     • cyclobenzaprine (FLEXERIL) 10 mg tablet Take 1 tablet (10 mg total) by mouth daily as needed for muscle spasms 30 tablet 0   • FLUoxetine (PROzac) 40 MG capsule Take 1 capsule (40 mg total) by mouth daily 90 capsule 2   • losartan (COZAAR) 100 MG tablet Take 1 tablet (100 mg total) by mouth daily 90 tablet 2   • methocarbamol (ROBAXIN) 500 mg tablet Take 1 tablet (500 mg total) by mouth 4 (four) times a day 60 tablet 2   • Multiple Vitamin (multivitamin) tablet Take 1 tablet by mouth daily     • tadalafil (CIALIS) 2.5 MG tablet Take 1 tablet (2.5 mg total) by mouth daily 90 tablet 2   • clomiPHENE (CLOMID) 50 mg tablet Take 0.5 tablets (25 mg total) by mouth daily (Patient not taking: Reported on 10/24/2023) 15 tablet 3     No current facility-administered medications for this visit.        Objective:  /64   Pulse 77   Temp 97.9 °F (36.6 °C)   Resp 16   Ht 5' 10" (1.778 m)   Wt 84.4 kg (186 lb)   SpO2 98%   BMI 26.69 kg/m²    Wt Readings from Last 3 Encounters:   10/24/23 84.4 kg (186 lb)   10/05/23 83.5 kg (184 lb)   09/26/23 83.5 kg (184 lb)      BP Readings from Last 3 Encounters:   10/24/23 116/64   10/18/23 123/80   10/05/23 128/89          Physical Exam  Vitals and nursing note reviewed. Constitutional:       Appearance: Normal appearance. He is well-developed. HENT:      Head: Normocephalic and atraumatic. Eyes:      Conjunctiva/sclera: Conjunctivae normal.   Neck:      Thyroid: No thyromegaly. Cardiovascular:      Rate and Rhythm: Normal rate and regular rhythm. Heart sounds: Normal heart sounds. Pulmonary:      Effort: Pulmonary effort is normal.      Breath sounds: Normal breath sounds. Musculoskeletal:         General: No swelling or tenderness. Cervical back: Neck supple. Right lower leg: No edema. Left lower leg: No edema. Neurological:      General: No focal deficit present. Mental Status: He is alert and oriented to person, place, and time. Psychiatric:         Mood and Affect: Mood normal.         Behavior: Behavior normal.         Thought Content: Thought content normal.         Judgment: Judgment normal.         Lab Results:      Lab Results   Component Value Date    WBC 6.32 02/09/2023    HGB 16.6 02/09/2023    HCT 52.2 (H) 02/09/2023     02/09/2023    TRIG 207 (H) 02/09/2023    HDL 37 (L) 02/09/2023    LDLDIRECT 116 (H) 02/09/2023    ALT 32 10/12/2023    AST 29 10/12/2023    K 4.1 10/12/2023     10/12/2023    CREATININE 1.18 10/12/2023    BUN 23 10/12/2023    CO2 27 10/12/2023    PSA 0.64 05/26/2023    GLUF 91 10/12/2023    HGBA1C 5.5 02/09/2023     No results found for: "URICACID"  Invalid input(s): "BASENAME" Vitamin D    FL spine and pain procedure    Result Date: 10/18/2023  Narrative:  Indication: Low back/buttock pain Preoperative Diagnosis: 1. piriformis syndrome Postoperative Diagnosis: 1. piriformis syndrome Procedure: Fluoroscopic injection of the bilateral piriformis muscles After discussing the risks, benefits, and alternatives to the procedure, the patient expressed understanding and wished to proceed. The patient was brought to the fluoroscopy suite and placed in the prone position. Procedural pause conducted to verify: correct patient identity, procedure to be performed and as applicable, correct side and site, correct patient position, and availability of implants, special equipment and special requirements. Using fluoroscopy, the superior portion of the acetabulum was identified. The skin was sterilely prepped and draped in the usual fashion using Chloraprep skin prep. Using fluoroscopic guidance, a 3.5 inch 25 gauge spinal needle was advanced to the target. Proper needle positioning was confirmed using multiple fluoroscopic views. After negative aspiration, Omnipaque 300 contrast was injected, showing intramuscular spread of contrast without any evidence of intravascular uptake. A 4 mL solution consisting of 40 mg of Depo-Medrol in 0.25% bupivacaine was injected slowly and incrementally into the piriformis muscle. Following the injection, the needle was withdrawn. The procedure was then repeated on the opposite side in the exact same fashion. The patient tolerated the procedure well and there were no apparent complications. After appropriate observation, the patient was dismissed from the clinic in good condition under their own power. X-ray foot left 3+ views    Result Date: 10/3/2023  Narrative: LEFT FOOT INDICATION:   M21.612: Bunion of left foot. COMPARISON:  None VIEWS:  XR FOOT 3+ VW LEFT Images: 3 FINDINGS: There is no acute fracture or dislocation. Mild degenerative change at the first MTP joint. There is mild hallux valgus deformity with the lateral subluxation of the lateral sesamoid.  Mild soft tissue bunion The first MTP joint is concentric No lytic or blastic osseous lesion. Soft tissues are unremarkable.      Impression: Mild degenerative change at the first MTP with mild hallux valgus with soft tissue bunion No dislocation Workstation performed: [id number]        POCT Labs

## 2023-10-25 ENCOUNTER — TELEPHONE (OUTPATIENT)
Dept: PAIN MEDICINE | Facility: CLINIC | Age: 48
End: 2023-10-25

## 2023-10-25 NOTE — ASSESSMENT & PLAN NOTE
Stable s statin. Recommend lifestyle modifications.      The 10-year ASCVD risk score (Dee KING, et al., 2019) is: 3.2%    Values used to calculate the score:      Age: 51 years      Sex: Male      Is Non- : No      Diabetic: No      Tobacco smoker: No      Systolic Blood Pressure: 869 mmHg      Is BP treated: Yes      HDL Cholesterol: 37 mg/dL      Total Cholesterol: 165 mg/dL

## 2023-10-25 NOTE — TELEPHONE ENCOUNTER
Patient reports 0% improvement post inj  Pain level 6/10    Patient would like to know next steps for treatment. Asked for a call back to discuss.

## 2023-10-25 NOTE — ASSESSMENT & PLAN NOTE
Unable to affod Clomid initiation per Uro, which may help increase Testosterone levels. He will call Uro to discuss testosterone replacement options.

## 2023-10-25 NOTE — ASSESSMENT & PLAN NOTE
Continue Cialis 2.5mg QD. Patient unable to afford Clomid initiation per Uro, which may help increase Testosterone levels.

## 2023-11-01 ENCOUNTER — OFFICE VISIT (OUTPATIENT)
Dept: PHYSICAL THERAPY | Facility: MEDICAL CENTER | Age: 48
End: 2023-11-01

## 2023-11-01 DIAGNOSIS — M25.562 CHRONIC PAIN OF LEFT KNEE: ICD-10-CM

## 2023-11-01 DIAGNOSIS — S86.002D INJURY OF LEFT ACHILLES TENDON, SUBSEQUENT ENCOUNTER: Primary | ICD-10-CM

## 2023-11-01 DIAGNOSIS — M25.561 CHRONIC PAIN OF RIGHT KNEE: ICD-10-CM

## 2023-11-01 DIAGNOSIS — G89.29 CHRONIC PAIN OF LEFT KNEE: ICD-10-CM

## 2023-11-01 DIAGNOSIS — G89.29 CHRONIC PAIN OF RIGHT KNEE: ICD-10-CM

## 2023-11-01 PROCEDURE — 97140 MANUAL THERAPY 1/> REGIONS: CPT | Performed by: PHYSICAL THERAPIST

## 2023-11-01 PROCEDURE — 97110 THERAPEUTIC EXERCISES: CPT | Performed by: PHYSICAL THERAPIST

## 2023-11-06 ENCOUNTER — OFFICE VISIT (OUTPATIENT)
Dept: PHYSICAL THERAPY | Facility: MEDICAL CENTER | Age: 48
End: 2023-11-06

## 2023-11-06 DIAGNOSIS — M25.561 CHRONIC PAIN OF RIGHT KNEE: ICD-10-CM

## 2023-11-06 DIAGNOSIS — G89.29 CHRONIC PAIN OF RIGHT KNEE: ICD-10-CM

## 2023-11-06 DIAGNOSIS — S86.002D INJURY OF LEFT ACHILLES TENDON, SUBSEQUENT ENCOUNTER: Primary | ICD-10-CM

## 2023-11-06 PROCEDURE — 97110 THERAPEUTIC EXERCISES: CPT | Performed by: PHYSICAL THERAPIST

## 2023-11-06 PROCEDURE — 97140 MANUAL THERAPY 1/> REGIONS: CPT | Performed by: PHYSICAL THERAPIST

## 2023-11-06 NOTE — PROGRESS NOTES
Daily Note     Today's date: 2023  Patient name: Marianna Sosa  : 1975  MRN: [id number]  Referring provider: Abhijit Mathias, *  Dx:   Encounter Diagnosis     ICD-10-CM    1. Injury of left Achilles tendon, subsequent encounter  S86.002D       2. Chronic pain of right knee  M25.561     G89.29                      Subjective: Miguel Angel reports he walked this morning 35 minutes without pain. Objective: See treatment diary below      Assessment: Tolerated treatment well. Patient exhibited good technique with therapeutic exercises      Plan: Progress treatment as tolerated.        Precautions: none      Manuals 9/15 9/20 9/26 10/5 10/13 10/18 10/23 11/1 11/6    Laser L Achilles  JE JE JE JE JE JE JE JE    STM L gastroc  JE JE JE JE JE JE JE JE                              Neuro Re-Ed                                                                                                        Ther Ex             HS stretch 30"x2 30"x2 30"x2 30"x2 30"x3 30"x3 30"x3 30"x3 30"x3    Piriformis stretch mod 30"x2 30"x2 30"x2 30"x2 30"x3 30"x3 30"x3      Hip flexor stretch 1/2 kneel 30"x2 30"x2 30"x2 30"x2 30"x3  30"x3 30"x3 30"x3    gastroc stretch gr strap  30"x2 30"x2 30"x2  30"x3       gastroc stretch off step bent knee 30"x2 30"x2 30"x2 30"x2         PF TB black  ecc 2x10 ecc 2x10 ecc 2x10         Heel raises  1x10 pain 3x10  3x10 3x10 3x10 3x10 3x10    bridges  2x10 2x12          Side stepping TB  20'x4 Gr 20'x4 20'x4 20'x4 20'x4 20'x4 20'x4 20'x4 Gr    SLS   ABC 1x rebounder 30x2 Rebounder 30x2 Rebounder 30x2 ABC's airex ABC's airex 30x2    Lat step ups  x10 ea L2 Up & over L2    15 15 20    steamboats    3#, 5# ext, flex 3# 20ea 3# 30ea  3# 30ea     Heel raises 2 up 1 down    2x10 2x10 3x10 3x10 3x10 3x10    SL heel raises    2x10 2x10 2x10, 1x5 3x10 3x10 3x10    Step ups     L3 2x10ea L3 20ea L2 20ea L2 20ea     Ther Activity                                       Gait Training Modalities

## 2023-11-10 ENCOUNTER — OFFICE VISIT (OUTPATIENT)
Dept: PODIATRY | Facility: CLINIC | Age: 48
End: 2023-11-10
Payer: COMMERCIAL

## 2023-11-10 VITALS
WEIGHT: 186 LBS | HEIGHT: 70 IN | SYSTOLIC BLOOD PRESSURE: 113 MMHG | BODY MASS INDEX: 26.63 KG/M2 | DIASTOLIC BLOOD PRESSURE: 74 MMHG | HEART RATE: 65 BPM

## 2023-11-10 DIAGNOSIS — M20.12 HALLUX ABDUCTO VALGUS, LEFT: Primary | ICD-10-CM

## 2023-11-10 DIAGNOSIS — M79.672 PAIN IN LEFT FOOT: ICD-10-CM

## 2023-11-10 DIAGNOSIS — M76.62 TENDONITIS, ACHILLES, LEFT: ICD-10-CM

## 2023-11-10 PROCEDURE — 99213 OFFICE O/P EST LOW 20 MIN: CPT | Performed by: PODIATRIST

## 2023-11-10 NOTE — PROGRESS NOTES
Assessment/Plan:   Achilles tendon progressing satisfactorily and continued pain from left bunion  Continue with daily stretching and icing if needed.  X-rays ordered with prior visit personally reviewed and then reviewed with patient in detail.  Evaluation of hallux valgus deformity notes a 15.5-16 degree IM angle.  This amount of deformity would require a proximal osteotomy or arthrodesis of the first met/cuneiform joint such as a Lapidus procedure.  Requesting surgery sometime in March/April  Follow-up sometime in February for preop visit       Diagnoses and all orders for this visit:    Hallux abducto valgus, left    Pain in left foot    Tendonitis, Achilles, left          Subjective:     Patient ID: Rojelio Lorenzo is a 48 y.o. male.    11/10/2023: Patient presents for follow-up evaluation of Achilles tendinitis left foot which is 95% resolved and reports he still has moderate pain from his left bunion deformity.  Patient states he would like to get his bunion fixed sometime in March and April 2024.    9/26/2023: 48-year-old male presents today concerned with left great toe joint pain in addition to his left Achilles tendinitis which he is going to PT for.  Patient is an active runner.  Denies any injury.        Review of Systems   Constitutional: Negative.    HENT: Negative.     Eyes: Negative.    Respiratory: Negative.     Cardiovascular: Negative.    Gastrointestinal: Negative.    Endocrine: Negative.    Genitourinary: Negative.    Musculoskeletal:  Positive for arthralgias and joint swelling.        Painful left great toe joint and resolution of left heel pain.   Allergic/Immunologic: Negative.    Neurological: Negative.    Hematological: Negative.    Psychiatric/Behavioral: Negative.           Objective:     Physical Exam  Constitutional:       Appearance: Normal appearance.   HENT:      Head: Normocephalic.      Right Ear: Tympanic membrane normal.      Left Ear: Tympanic membrane normal.      Nose: No  congestion.      Mouth/Throat:      Pharynx: No oropharyngeal exudate or posterior oropharyngeal erythema.   Eyes:      Conjunctiva/sclera: Conjunctivae normal.      Pupils: Pupils are equal, round, and reactive to light.   Cardiovascular:      Rate and Rhythm: Normal rate and regular rhythm.      Pulses: Normal pulses.   Pulmonary:      Effort: Pulmonary effort is normal.   Musculoskeletal:         General: Tenderness present.      Right foot: Bunion present.      Comments: Minimal tenderness with palpation left heel at Achilles tendon insertion.    Pain with palpation range of motion left first MPJ, clinical deformity consistent with hallux abductovalgus.  Range of motion slightly diminished 20-25 degrees    X-rays ordered from initial visit reviewed with 15.5 degree IM angle noted.  Sesamoid position #4   Feet:      Right foot:      Protective Sensation: 10 sites tested.  10 sites sensed.      Left foot:      Protective Sensation: 10 sites tested.  10 sites sensed.   Skin:     General: Skin is warm and dry.      Capillary Refill: Capillary refill takes 2 to 3 seconds.      Coloration: Skin is not pale.      Findings: No bruising, erythema, lesion or rash.   Neurological:      General: No focal deficit present.      Mental Status: He is alert.      Cranial Nerves: No cranial nerve deficit.      Sensory: No sensory deficit.      Motor: No weakness.      Gait: Gait normal.      Deep Tendon Reflexes: Reflexes normal.   Psychiatric:         Mood and Affect: Mood normal.         Behavior: Behavior normal.         Judgment: Judgment normal.

## 2023-11-12 DIAGNOSIS — F41.9 ANXIETY: ICD-10-CM

## 2023-11-12 DIAGNOSIS — F32.0 CURRENT MILD EPISODE OF MAJOR DEPRESSIVE DISORDER, UNSPECIFIED WHETHER RECURRENT (HCC): ICD-10-CM

## 2023-11-13 RX ORDER — FLUOXETINE HYDROCHLORIDE 40 MG/1
40 CAPSULE ORAL DAILY
Qty: 90 CAPSULE | Refills: 0 | Status: SHIPPED | OUTPATIENT
Start: 2023-11-13

## 2023-11-15 ENCOUNTER — OFFICE VISIT (OUTPATIENT)
Dept: PHYSICAL THERAPY | Facility: MEDICAL CENTER | Age: 48
End: 2023-11-15

## 2023-11-15 ENCOUNTER — OFFICE VISIT (OUTPATIENT)
Dept: PAIN MEDICINE | Facility: MEDICAL CENTER | Age: 48
End: 2023-11-15
Payer: COMMERCIAL

## 2023-11-15 VITALS
HEART RATE: 83 BPM | HEIGHT: 70 IN | OXYGEN SATURATION: 97 % | WEIGHT: 188.7 LBS | BODY MASS INDEX: 27.01 KG/M2 | SYSTOLIC BLOOD PRESSURE: 119 MMHG | DIASTOLIC BLOOD PRESSURE: 88 MMHG

## 2023-11-15 DIAGNOSIS — M54.41 CHRONIC BILATERAL LOW BACK PAIN WITH BILATERAL SCIATICA: Primary | ICD-10-CM

## 2023-11-15 DIAGNOSIS — M46.1 SACROILIITIS (HCC): ICD-10-CM

## 2023-11-15 DIAGNOSIS — G89.29 CHRONIC BILATERAL LOW BACK PAIN WITH BILATERAL SCIATICA: Primary | ICD-10-CM

## 2023-11-15 DIAGNOSIS — S86.002D INJURY OF LEFT ACHILLES TENDON, SUBSEQUENT ENCOUNTER: Primary | ICD-10-CM

## 2023-11-15 DIAGNOSIS — G57.03 PIRIFORMIS SYNDROME OF BOTH SIDES: ICD-10-CM

## 2023-11-15 DIAGNOSIS — M79.18 MYOFASCIAL PAIN SYNDROME: ICD-10-CM

## 2023-11-15 DIAGNOSIS — M54.42 CHRONIC BILATERAL LOW BACK PAIN WITH BILATERAL SCIATICA: Primary | ICD-10-CM

## 2023-11-15 DIAGNOSIS — M53.3 COCCYDYNIA: ICD-10-CM

## 2023-11-15 PROCEDURE — 97110 THERAPEUTIC EXERCISES: CPT | Performed by: PHYSICAL THERAPIST

## 2023-11-15 PROCEDURE — 99214 OFFICE O/P EST MOD 30 MIN: CPT | Performed by: PHYSICIAN ASSISTANT

## 2023-11-15 NOTE — PROGRESS NOTES
Assessment:  1. Chronic bilateral low back pain with bilateral sciatica    2. Sacroiliitis ([address])    3. Piriformis syndrome of both sides    4. Myofascial pain syndrome    5. Coccydynia        Plan:  While the patient was in the office today, I did have a thorough conversation regarding their chronic pain syndrome, medication management, and treatment plan options. Unfortunately the patient has failed to respond to numerous approaches including sacroiliac joint injection, sacrococcygeal injection and most recently bilateral piriformis injections. The patient is interested in formal physical therapy and therefore I have provided him with a referral to do so. I am hoping this improves his pain however I told him that I would discuss the possibility of a [employer] peripheral nerve stimulator with Dr. Taye Best to see if that may be an option for him. The patient will follow-up with us on a as needed basis at this point. He may continue methocarbamol as needed. My impressions and treatment recommendations were discussed in detail with the patient who verbalized understanding and had no further questions. Discharge instructions were provided. I personally saw and examined the patient and I agree with the above discussed plan of care. Orders Placed This Encounter   Procedures   • Ambulatory referral to Physical Therapy     Standing Status:   Future     Standing Expiration Date:   11/15/2024     Referral Priority:   Routine     Referral Type:   Physical Therapy     Referral Reason:   Specialty Services Required     Requested Specialty:   Physical Therapy     Number of Visits Requested:   1     Expiration Date:   11/15/2024     No orders of the defined types were placed in this encounter. History of Present Illness:  Geraldine Jin is a 51 y.o. male who presents for a follow up office visit in regards to chronic pain.    The patient’s current symptoms include chronic low back pain and bilateral buttock pain that he presently rates an 8 out of 10 and describes it as a constant dull, aching, sharp pain. His pain is made worse with any type of exertion. He finds moderate relief with the methocarbamol throughout the day but at times still has to take the Flexeril at bedtime. Most recently reported no relief with bilateral piriformis injections. Prior to that did not sustain any relief from sacroiliac joint injections and sacrococcygeal joint injection. Previously attended chiropractic therapy in the past but found that it caused him too much pain. He has attended physical therapy but not for any significant length of time and he is willing to consider trying again. The methocarbamol has been beneficial in reducing some of the spasms throughout the day. His MRI from July reveals mild degenerative spondylosis. Has not exhibited any pain in the lumbar structures. I have personally reviewed and/or updated the patient's past medical history, past surgical history, family history, social history, current medications, allergies, and vital signs today. Review of Systems   Respiratory:  Negative for shortness of breath. Cardiovascular:  Negative for chest pain. Gastrointestinal:  Negative for constipation, diarrhea, nausea and vomiting. Musculoskeletal:  Positive for back pain and myalgias. Negative for arthralgias, gait problem and joint swelling. Skin:  Negative for rash. Neurological:  Negative for dizziness, seizures and weakness. All other systems reviewed and are negative.       Patient Active Problem List   Diagnosis   • Anxiety   • Depression   • Hypertension   • Hyperlipidemia   • Overweight   • ED (erectile dysfunction)   • Vitamin D deficiency   • Bertolotti's syndrome   • Lumbar spondylosis   • Myofascial pain syndrome   • Coccydynia   • Low testosterone   • Sacroiliitis, not elsewhere classified [facility])       Past Medical History:   Diagnosis Date   • Anxiety    • Bertolotti's syndrome 2020   • Depression    • ED (erectile dysfunction)    • History of Graves' disease     s/p Tapazole x 2 years, then radioactive iodine, which resulted in euthyroid status   • Hyperlipidemia    • Hypertension    • Low testosterone 2023   • Lumbar spondylosis 2020   • Myofascial pain syndrome of lumbar spine 2020   • Overweight    • Vitamin D deficiency        Past Surgical History:   Procedure Laterality Date   • NO PAST SURGERIES         Family History   Problem Relation Age of Onset   • Hypertension Mother    • Diabetes type II Mother    • Stomach cancer Mother 48   • Depression Mother    • Hyperlipidemia Mother    • Mental illness Mother    • Depression Father    • Mental retardation Brother    • No Known Problems Daughter        Social History     Occupational History   • Occupation: Paramedic with [employer]   Tobacco Use   • Smoking status: Former     Packs/day: 1.50     Years: 19.00     Total pack years: 28.50     Types: Cigarettes     Start date: 1992     Quit date: 2011     Years since quittin.8   • Smokeless tobacco: Never   Vaping Use   • Vaping Use: Never used   Substance and Sexual Activity   • Alcohol use:  Yes     Alcohol/week: 3.0 standard drinks of alcohol     Types: 3 Standard drinks or equivalent per week     Comment: occasionally, socially   • Drug use: Not Currently     Types: Marijuana     Comment: Last use 22yo   • Sexual activity: Yes     Partners: Female     Birth control/protection: None       Current Outpatient Medications on File Prior to Visit   Medication Sig   • Cholecalciferol (Vitamin D3) 125 MCG (5000 UT) TABS Take 5,000 Units by mouth daily   • [employer] FIBER GUMMIES PO Take 2 tablets by mouth in the morning   • cyclobenzaprine (FLEXERIL) 10 mg tablet Take 1 tablet (10 mg total) by mouth daily as needed for muscle spasms   • FLUoxetine (PROzac) 40 MG capsule Take 1 capsule (40 mg total) by mouth daily   • losartan (COZAAR) 100 MG tablet Take 1 tablet (100 mg total) by mouth daily   • methocarbamol (ROBAXIN) 500 mg tablet Take 1 tablet (500 mg total) by mouth 4 (four) times a day   • Multiple Vitamin (multivitamin) tablet Take 1 tablet by mouth daily   • tadalafil (CIALIS) 2.5 MG tablet Take 1 tablet (2.5 mg total) by mouth daily   • clomiPHENE (CLOMID) 50 mg tablet Take 0.5 tablets (25 mg total) by mouth daily (Patient not taking: Reported on 10/24/2023)     No current facility-administered medications on file prior to visit. No Known Allergies    Physical Exam:    /88   Pulse 83   Ht 5' 10" (1.778 m)   Wt 85.6 kg (188 lb 11.2 oz)   SpO2 97%   BMI 27.08 kg/m²     Constitutional:normal, well developed, well nourished, alert, in no distress and non-toxic and no overt pain behavior. Eyes:anicteric  HEENT:grossly intact  Neck:supple, symmetric, trachea midline and no masses   Pulmonary:even and unlabored  Cardiovascular:No edema or pitting edema present  Skin:Normal without rashes or lesions and well hydrated  Psychiatric:Mood and affect appropriate  Neurologic:Cranial Nerves II-XII grossly intact  Musculoskeletal: forward leaning.      Imaging

## 2023-11-15 NOTE — PROGRESS NOTES
Daily Note     Today's date: 11/15/2023  Patient name: Michael Hernández  : 1975  MRN: [id number]  Referring provider: Candelario Bolivar, *  Dx:   Encounter Diagnosis     ICD-10-CM    1. Injury of left Achilles tendon, subsequent encounter  S86.002D                      Subjective: Reena Anaya reports he ran 1' intervals for 20 minutes and had no pain in his Achilles      Objective: See treatment diary below      Assessment: Tolerated treatment well.  Patient exhibited good technique with therapeutic exercises      Plan:  Discussed continuing with HEP at least 3x/week and discussed return to running program.      Precautions: none      Manuals 9/15 9/20 9/26 10/5 10/13 10/18 10/23 11/1 11/6 11/15   Laser L Achilles  JE JE JE JE JE JE JE JE    STM L gastroc  JE JE JE JE JE JE JE JE JE                             Neuro Re-Ed                                                                                                        Ther Ex             HS stretch 30"x2 30"x2 30"x2 30"x2 30"x3 30"x3 30"x3 30"x3 30"x3 30"x3   Piriformis stretch mod 30"x2 30"x2 30"x2 30"x2 30"x3 30"x3 30"x3      Hip flexor stretch 1/2 kneel 30"x2 30"x2 30"x2 30"x2 30"x3  30"x3 30"x3 30"x3 30"x3   gastroc stretch gr strap  30"x2 30"x2 30"x2  30"x3       gastroc stretch off step bent knee 30"x2 30"x2 30"x2 30"x2         PF TB black  ecc 2x10 ecc 2x10 ecc 2x10         Heel raises  1x10 pain 3x10  3x10 3x10 3x10 3x10 3x10 3x10   bridges  2x10 2x12          Side stepping TB  20'x4 Gr 20'x4 20'x4 20'x4 20'x4 20'x4 20'x4 20'x4 Gr    SLS   ABC 1x rebounder 30x2 Rebounder 30x2 Rebounder 30x2 ABC's airex ABC's airex 30x2    Lat step ups  x10 ea L2 Up & over L2    15 15 20    steamboats    3#, 5# ext, flex 3# 20ea 3# 30ea  3# 30ea     Heel raises 2 up 1 down    2x10 2x10 3x10 3x10 3x10 3x10 3x10   SL heel raises    2x10 2x10 2x10, 1x5 3x10 3x10 3x10 3x10   Step ups     L3 2x10ea L3 20ea L2 20ea L2 20ea     Dynamic warmup          HEP   Ther Activity Gait Training                                       Modalities

## 2023-11-16 DIAGNOSIS — M46.1 SACROILIITIS, NOT ELSEWHERE CLASSIFIED (HCC): Primary | ICD-10-CM

## 2023-11-16 DIAGNOSIS — M53.3 COCCYDYNIA: ICD-10-CM

## 2023-11-17 DIAGNOSIS — M53.3 COCCYDYNIA: ICD-10-CM

## 2023-11-17 DIAGNOSIS — M46.1 SACROILIITIS, NOT ELSEWHERE CLASSIFIED (HCC): Primary | ICD-10-CM

## 2023-11-17 DIAGNOSIS — G57.03 PIRIFORMIS SYNDROME OF BOTH SIDES: ICD-10-CM

## 2023-12-04 ENCOUNTER — EVALUATION (OUTPATIENT)
Dept: PHYSICAL THERAPY | Facility: MEDICAL CENTER | Age: 48
End: 2023-12-04
Payer: COMMERCIAL

## 2023-12-04 DIAGNOSIS — G57.03 PIRIFORMIS SYNDROME OF BOTH SIDES: ICD-10-CM

## 2023-12-04 DIAGNOSIS — G89.29 CHRONIC BILATERAL LOW BACK PAIN WITH BILATERAL SCIATICA: ICD-10-CM

## 2023-12-04 DIAGNOSIS — M54.42 CHRONIC BILATERAL LOW BACK PAIN WITH BILATERAL SCIATICA: ICD-10-CM

## 2023-12-04 DIAGNOSIS — M54.41 CHRONIC BILATERAL LOW BACK PAIN WITH BILATERAL SCIATICA: ICD-10-CM

## 2023-12-04 DIAGNOSIS — M46.1 SACROILIITIS (HCC): Primary | ICD-10-CM

## 2023-12-04 PROCEDURE — 97161 PT EVAL LOW COMPLEX 20 MIN: CPT | Performed by: PHYSICAL THERAPIST

## 2023-12-04 PROCEDURE — 97140 MANUAL THERAPY 1/> REGIONS: CPT | Performed by: PHYSICAL THERAPIST

## 2023-12-04 NOTE — PROGRESS NOTES
PT Evaluation     Today's date: 2023  Patient name: Tae Tellez  : 1975  MRN: [id number]  Referring provider: Elizabeth Styles, *  Dx:   Encounter Diagnosis     ICD-10-CM    1. Sacroiliitis ([address])  M46.1 Ambulatory referral to Physical Therapy      2. Chronic bilateral low back pain with bilateral sciatica  M54.42 Ambulatory referral to Physical Therapy    M54.41     G89.29       3. Piriformis syndrome of both sides  G57.03 Ambulatory referral to Physical Therapy                     Assessment  Assessment details: Tae Tellez is a 51 y.o. male presents with chronic LBP. Tae Tellez has the below listed impairments and will benefit from skilled PT to improve deficits to return to prior level of function. Impairments: abnormal muscle firing, abnormal or restricted ROM, impaired physical strength and pain with function  Understanding of Dx/Px/POC: good   Prognosis: good    Goals  Impairment Goals  - Decrease pain to 0-3/10  - Improve ROM equal to [facility]  - Increase strength equal to 5/5    Functional Goals  - Patient will be independent with comprehensive HEP  - Ambulation is improved to prior level of function  - Squatting is improved to prior level of function  - Patient will be able to sleep without disturbance due to pain  - Patient will be able to lift/carry objects without provocation of symptoms      Plan  Patient would benefit from: skilled PT  Referral necessary: No  Planned therapy interventions: home exercise program, manual therapy, neuromuscular re-education, patient education, functional ROM exercises, strengthening, stretching and joint mobilization  Frequency: 2x week  Duration in weeks: 12  Treatment plan discussed with: patient      Subjective Evaluation    History of Present Illness  Mechanism of injury: Yg Washburn presents with chronic LBP. MRI of lumbar spine from July results: Mild degenerative spondylosis most pronounced at L5-S1 with bilateral subarticular stenosis. Scheduled for MRI of his pelvis tomorrow. He has had sacral and piriformis injections without relief. He has pain at rest and with activity and feels limited with all aspects of his life. Patient Goals  Patient goals for therapy: decreased pain    Pain  At best pain ratin  At worst pain ratin        Objective     Tenderness     Left Hip   Tenderness in the PSIS. Right Hip   Tenderness in the PSIS. Active Range of Motion     Lumbar   Flexion:  with pain Restriction level: moderate  Extension:  with pain Restriction level: moderate  Left lateral flexion:  WFL and with pain  Right lateral flexion:  WFL and with pain  Left rotation:  with pain Restriction level: minimal  Right rotation:  with pain Restriction level: minimal    Additional Active Range of Motion Details  Dec pain with SIJ compression    Joint Play     Pain: L4, L5 and S1     Tests     Lumbar   Positive SIJ compression. Left   Negative passive SLR. Right   Negative passive SLR. Left Pelvic Girdle/Sacrum   Positive: active SLR test.     Right Pelvic Girdle/Sacrum   Positive: active SLR test.     Left Hip   Positive long sit. Negative COMFORT. Right Hip   Negative COMFORT.               Precautions: none      Manuals 12/4            Long axis distraction JE            Manual SIJ compresison w/ ROM JE                                      Neuro Re-Ed                                                                                                        Ther Ex             bridges             Piriformis stretch mod             Supine abd brace             Supine march                                                                 Ther Activity                                       Gait Training                                       Modalities              10'

## 2023-12-05 ENCOUNTER — HOSPITAL ENCOUNTER (OUTPATIENT)
Facility: MEDICAL CENTER | Age: 48
Discharge: HOME | End: 2023-12-05
Payer: COMMERCIAL

## 2023-12-05 DIAGNOSIS — M53.3 COCCYDYNIA: ICD-10-CM

## 2023-12-05 DIAGNOSIS — M46.1 SACROILIITIS, NOT ELSEWHERE CLASSIFIED (HCC): ICD-10-CM

## 2023-12-05 PROCEDURE — G1004 CDSM NDSC: HCPCS

## 2023-12-05 PROCEDURE — 72195 MRI PELVIS W/O DYE: CPT

## 2023-12-06 ENCOUNTER — PATIENT MESSAGE (OUTPATIENT)
Dept: FAMILY MEDICINE CLINIC | Facility: CLINIC | Age: 48
End: 2023-12-06

## 2023-12-08 DIAGNOSIS — Z00.6 ENCOUNTER FOR EXAMINATION FOR NORMAL COMPARISON OR CONTROL IN CLINICAL RESEARCH PROGRAM: ICD-10-CM

## 2023-12-11 ENCOUNTER — PATIENT MESSAGE (OUTPATIENT)
Dept: FAMILY MEDICINE CLINIC | Facility: CLINIC | Age: 48
End: 2023-12-11

## 2023-12-11 ENCOUNTER — TELEPHONE (OUTPATIENT)
Dept: PAIN MEDICINE | Facility: MEDICAL CENTER | Age: 48
End: 2023-12-11

## 2023-12-11 NOTE — TELEPHONE ENCOUNTER
----- Message from Pili Ramon PA-C sent at 12/11/2023 12:31 PM EST -----  Please let patient know that Dr. Taye Best and I have reviewed his MRI. There is mild edema in the left sacral region. We can offer a fluoro guided TPI at the S1-S2 junction if patient is interested. There is also evidence of mild benign prostatic   hypertrophy;  please have him follow up with PCP or urology regarding this.

## 2023-12-11 NOTE — TELEPHONE ENCOUNTER
Pt appreciative of information and will call his PCP regarding BPH. Pt would like the Flouro guided TPI please call to schedule same.

## 2023-12-12 NOTE — TELEPHONE ENCOUNTER
Left message for patient to call me back DIRECTLY to schedule. Left my DIRECT phone # [phone] on message.

## 2023-12-12 NOTE — TELEPHONE ENCOUNTER
Procedure Scheduled 1/2/2024    Reviewed instructions: , NPO 1 hour prior, loose-fitting/comfortable clothes, if ill/fever/infx/abx to call and reschedule. Patient stated verbal understanding.

## 2023-12-13 ENCOUNTER — OFFICE VISIT (OUTPATIENT)
Dept: PHYSICAL THERAPY | Facility: MEDICAL CENTER | Age: 48
End: 2023-12-13
Payer: COMMERCIAL

## 2023-12-13 DIAGNOSIS — M54.42 CHRONIC BILATERAL LOW BACK PAIN WITH BILATERAL SCIATICA: Primary | ICD-10-CM

## 2023-12-13 DIAGNOSIS — G89.29 CHRONIC BILATERAL LOW BACK PAIN WITH BILATERAL SCIATICA: Primary | ICD-10-CM

## 2023-12-13 DIAGNOSIS — M46.1 SACROILIITIS (HCC): ICD-10-CM

## 2023-12-13 DIAGNOSIS — G57.03 PIRIFORMIS SYNDROME OF BOTH SIDES: ICD-10-CM

## 2023-12-13 DIAGNOSIS — M54.41 CHRONIC BILATERAL LOW BACK PAIN WITH BILATERAL SCIATICA: Primary | ICD-10-CM

## 2023-12-13 PROCEDURE — 97110 THERAPEUTIC EXERCISES: CPT | Performed by: PHYSICAL THERAPIST

## 2023-12-13 NOTE — PROGRESS NOTES
Daily Note     Today's date: 2023  Patient name: Honey Galeana  : 1975  MRN: [id number]  Referring provider: Keke Robles, *  Dx:   Encounter Diagnosis     ICD-10-CM    1. Chronic bilateral low back pain with bilateral sciatica  M54.42     M54.41     G89.29       2. Sacroiliitis ([address])  M46.1       3. Piriformis syndrome of both sides  G57.03                      Subjective: Genna Tera reports he is scheduled for a fluoro guided TPI at the S1-S2 junction on 23. Objective: See treatment diary below      Assessment: Tolerated treatment well. Patient exhibited good technique with therapeutic exercises      Plan: Progress treatment as tolerated.        Precautions: none      Manuals            Long axis distraction JE            Manual SIJ compresison w/ ROM JE                                      Neuro Re-Ed                                                                                                        Ther Ex                          Piriformis stretch mod  30"x3           Supine abd brace 5"x10 5"x10           Supine march  10           LTR 5"x10 5"x10           bridges  Pain 9           Foam roller hamstrings & gluts  X                        Ther Activity                                       Gait Training                                       Modalities              10' 10'

## 2023-12-18 ENCOUNTER — OFFICE VISIT (OUTPATIENT)
Dept: PHYSICAL THERAPY | Facility: MEDICAL CENTER | Age: 48
End: 2023-12-18
Payer: COMMERCIAL

## 2023-12-18 DIAGNOSIS — M46.1 SACROILIITIS (HCC): ICD-10-CM

## 2023-12-18 DIAGNOSIS — G57.03 PIRIFORMIS SYNDROME OF BOTH SIDES: ICD-10-CM

## 2023-12-18 DIAGNOSIS — G89.29 CHRONIC BILATERAL LOW BACK PAIN WITH BILATERAL SCIATICA: Primary | ICD-10-CM

## 2023-12-18 DIAGNOSIS — M54.42 CHRONIC BILATERAL LOW BACK PAIN WITH BILATERAL SCIATICA: Primary | ICD-10-CM

## 2023-12-18 DIAGNOSIS — M54.41 CHRONIC BILATERAL LOW BACK PAIN WITH BILATERAL SCIATICA: Primary | ICD-10-CM

## 2023-12-18 PROCEDURE — 97110 THERAPEUTIC EXERCISES: CPT | Performed by: PHYSICAL THERAPIST

## 2023-12-18 NOTE — PROGRESS NOTES
"Daily Note     Today's date: 2023  Patient name: Rojelio Lorenzo  : 1975  MRN: [id number]  Referring provider: Honey Flaherty, *  Dx:   Encounter Diagnosis     ICD-10-CM    1. Chronic bilateral low back pain with bilateral sciatica  M54.42     M54.41     G89.29       2. Piriformis syndrome of both sides  G57.03       3. Sacroiliitis (HCC)  M46.1                      Subjective: Rojelio reports he feels the same      Objective: See treatment diary below      Assessment: Tolerated treatment well. Patient  able to do bridges today with shortening the range      Plan: Progress treatment as tolerated.       Precautions: none      Manuals           Long axis distraction JE            Manual SIJ compresison w/ ROM JE                                      Neuro Re-Ed                                                                                                        Ther Ex                          Piriformis stretch mod  30\"x3 30\"x3          Supine abd brace 5\"x10 5\"x10 15 x 5\"          Supine march  10 10          LTR 5\"x10 5\"x10 X10 towel roll          bridges  Pain 9 10          Foam roller hamstrings & gluts  X X          Stand lumbar flexion   10          Seated lumbar flexion   10          SKTC   5ea          Ther Activity                                       Gait Training                                       Modalities              10' 10'                               "

## 2023-12-26 ENCOUNTER — APPOINTMENT (OUTPATIENT)
Dept: LAB | Facility: CLINIC | Age: 48
End: 2023-12-26

## 2023-12-26 DIAGNOSIS — Z00.6 ENCOUNTER FOR EXAMINATION FOR NORMAL COMPARISON OR CONTROL IN CLINICAL RESEARCH PROGRAM: ICD-10-CM

## 2023-12-26 PROCEDURE — 36415 COLL VENOUS BLD VENIPUNCTURE: CPT

## 2023-12-27 ENCOUNTER — OFFICE VISIT (OUTPATIENT)
Dept: PHYSICAL THERAPY | Facility: MEDICAL CENTER | Age: 48
End: 2023-12-27
Payer: COMMERCIAL

## 2023-12-27 DIAGNOSIS — G57.03 PIRIFORMIS SYNDROME OF BOTH SIDES: ICD-10-CM

## 2023-12-27 DIAGNOSIS — M46.1 SACROILIITIS (HCC): ICD-10-CM

## 2023-12-27 DIAGNOSIS — M54.42 CHRONIC BILATERAL LOW BACK PAIN WITH BILATERAL SCIATICA: Primary | ICD-10-CM

## 2023-12-27 DIAGNOSIS — M54.41 CHRONIC BILATERAL LOW BACK PAIN WITH BILATERAL SCIATICA: Primary | ICD-10-CM

## 2023-12-27 DIAGNOSIS — G89.29 CHRONIC BILATERAL LOW BACK PAIN WITH BILATERAL SCIATICA: Primary | ICD-10-CM

## 2023-12-27 PROCEDURE — 97110 THERAPEUTIC EXERCISES: CPT | Performed by: PHYSICAL THERAPIST

## 2023-12-27 NOTE — PROGRESS NOTES
"Daily Note     Today's date: 2023  Patient name: Rojelio Lorenzo  : 1975  MRN: [id number]  Referring provider: Honey Flaherty, *  Dx:   Encounter Diagnosis     ICD-10-CM    1. Chronic bilateral low back pain with bilateral sciatica  M54.42     M54.41     G89.29       2. Piriformis syndrome of both sides  G57.03       3. Sacroiliitis (HCC)  M46.1                      Subjective: Rojelio reports his back continues to feel about the same      Objective: See treatment diary below      Assessment: Tolerated treatment well. Patient exhibited good technique with therapeutic exercises      Plan: Potential discharge next visit.     Precautions: none      Manuals          Long axis distraction JE            Manual SIJ compresison w/ ROM JE                                      Neuro Re-Ed                                                                                                        Ther Ex                          Piriformis stretch mod  30\"x3 30\"x3 30\"x3         Supine abd brace 5\"x10 5\"x10 15 x 5\"          Supine march  10 10 20         LTR 5\"x10 5\"x10 X10 towel roll X10  towel roll         bridges  Pain 9 10 2x10         Foam roller hamstrings & gluts  X X X         Stand lumbar flexion   10 10         Seated lumbar flexion   10 20         SKTC   5ea 10ea 5\"         Hamstring stretch    30\"x3         Ther Activity                                       Gait Training                                       Modalities              10' 10'  10'                               "

## 2024-01-05 ENCOUNTER — OFFICE VISIT (OUTPATIENT)
Dept: PHYSICAL THERAPY | Facility: MEDICAL CENTER | Age: 49
End: 2024-01-05

## 2024-01-05 DIAGNOSIS — G57.03 PIRIFORMIS SYNDROME OF BOTH SIDES: ICD-10-CM

## 2024-01-05 DIAGNOSIS — M54.41 CHRONIC BILATERAL LOW BACK PAIN WITH BILATERAL SCIATICA: Primary | ICD-10-CM

## 2024-01-05 DIAGNOSIS — M46.1 SACROILIITIS (HCC): ICD-10-CM

## 2024-01-05 DIAGNOSIS — G89.29 CHRONIC BILATERAL LOW BACK PAIN WITH BILATERAL SCIATICA: Primary | ICD-10-CM

## 2024-01-05 DIAGNOSIS — M54.42 CHRONIC BILATERAL LOW BACK PAIN WITH BILATERAL SCIATICA: Primary | ICD-10-CM

## 2024-01-05 PROCEDURE — 97110 THERAPEUTIC EXERCISES: CPT | Performed by: PHYSICAL THERAPIST

## 2024-01-05 NOTE — PROGRESS NOTES
"Daily Note     Today's date: 2024  Patient name: Rojelio Lorenzo  : 1975  MRN: [id number]  Referring provider: Honey Flaherty, *  Dx:   Encounter Diagnosis     ICD-10-CM    1. Chronic bilateral low back pain with bilateral sciatica  M54.42     M54.41     G89.29       2. Piriformis syndrome of both sides  G57.03       3. Sacroiliitis (HCC)  M46.1                      Subjective: Rojelio reports his injection was post-poned until 1/10      Objective: See treatment diary below      Assessment: Tolerated treatment well. Patient exhibited good technique with therapeutic exercises      Plan: Progress treatment as tolerated.       Precautions: none      Manuals         Long axis distraction JE            Manual SIJ compresison w/ ROM JE                                      Neuro Re-Ed                                                                                                        Ther Ex                          Piriformis stretch mod  30\"x3 30\"x3 30\"x3         Supine abd brace 5\"x10 5\"x10 15 x 5\"  20        Supine march  10 10 20         LTR 5\"x10 5\"x10 X10 towel roll X10  towel roll x10        bridges  Pain 9 10 2x10 2x10        Foam roller hamstrings & gluts  X X X x        Stand lumbar flexion   10 10 10        Seated lumbar flexion   10 20 10        SKTC   5ea 10ea 5\" 10\"x10        Hamstring stretch    30\"x3         DKTC     10\"x10        Ther Activity                                       Gait Training                                       Modalities              10' 10'  10'                                 "

## 2024-01-10 ENCOUNTER — HOSPITAL ENCOUNTER (OUTPATIENT)
Dept: RADIOLOGY | Facility: MEDICAL CENTER | Age: 49
Discharge: HOME | End: 2024-01-10
Admitting: PHYSICAL MEDICINE & REHABILITATION
Payer: COMMERCIAL

## 2024-01-10 VITALS
OXYGEN SATURATION: 93 % | DIASTOLIC BLOOD PRESSURE: 73 MMHG | SYSTOLIC BLOOD PRESSURE: 116 MMHG | RESPIRATION RATE: 18 BRPM | HEART RATE: 78 BPM | TEMPERATURE: 97.3 F

## 2024-01-10 DIAGNOSIS — M79.18 MYOFASCIAL PAIN SYNDROME: ICD-10-CM

## 2024-01-10 DIAGNOSIS — M46.1 SACROILIITIS (HCC): ICD-10-CM

## 2024-01-10 PROCEDURE — 77002 NEEDLE LOCALIZATION BY XRAY: CPT | Performed by: PHYSICAL MEDICINE & REHABILITATION

## 2024-01-10 PROCEDURE — 20552 NJX 1/MLT TRIGGER POINT 1/2: CPT | Performed by: PHYSICAL MEDICINE & REHABILITATION

## 2024-01-10 RX ORDER — BUPIVACAINE HCL/PF 2.5 MG/ML
1.5 VIAL (ML) INJECTION ONCE
Status: COMPLETED | OUTPATIENT
Start: 2024-01-10 | End: 2024-01-10

## 2024-01-10 RX ORDER — METHYLPREDNISOLONE ACETATE 40 MG/ML
40 INJECTION, SUSPENSION INTRA-ARTICULAR; INTRALESIONAL; INTRAMUSCULAR; PARENTERAL; SOFT TISSUE ONCE
Status: COMPLETED | OUTPATIENT
Start: 2024-01-10 | End: 2024-01-10

## 2024-01-10 RX ADMIN — Medication 1.5 ML: at 15:47

## 2024-01-10 RX ADMIN — METHYLPREDNISOLONE ACETATE 40 MG: 40 INJECTION, SUSPENSION INTRA-ARTICULAR; INTRALESIONAL; INTRAMUSCULAR; PARENTERAL; SOFT TISSUE at 15:47

## 2024-01-10 RX ADMIN — IOHEXOL 0.5 ML: 300 INJECTION, SOLUTION INTRAVENOUS at 15:47

## 2024-01-10 NOTE — DISCHARGE INSTRUCTIONS

## 2024-01-10 NOTE — H&P
History of Present Illness: The patient is a 48 y.o. male who presents with complaints of left low back pain    Past Medical History:   Diagnosis Date    Anxiety     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    Depression     ED (erectile dysfunction)     History of Graves' disease     s/p Tapazole x 2 years, then radioactive iodine, which resulted in euthyroid status    Hyperlipidemia     Hypertension     Low testosterone 5/22/2023    Lumbar spondylosis 11/16/2020    Myofascial pain syndrome of lumbar spine 12/14/2020    Overweight     Vitamin D deficiency        Past Surgical History:   Procedure Laterality Date    NO PAST SURGERIES           Current Outpatient Medications:     Cholecalciferol (Vitamin D3) 125 MCG (5000 UT) TABS, Take 5,000 Units by mouth daily, Disp: , Rfl:     clomiPHENE (CLOMID) 50 mg tablet, Take 0.5 tablets (25 mg total) by mouth daily (Patient not taking: Reported on 10/24/2023), Disp: 15 tablet, Rfl: 3    [employer] FIBER GUMMIES PO, Take 2 tablets by mouth in the morning, Disp: , Rfl:     cyclobenzaprine (FLEXERIL) 10 mg tablet, Take 1 tablet (10 mg total) by mouth daily as needed for muscle spasms, Disp: 30 tablet, Rfl: 0    FLUoxetine (PROzac) 40 MG capsule, Take 1 capsule (40 mg total) by mouth daily, Disp: 90 capsule, Rfl: 0    losartan (COZAAR) 100 MG tablet, Take 1 tablet (100 mg total) by mouth daily, Disp: 90 tablet, Rfl: 2    methocarbamol (ROBAXIN) 500 mg tablet, Take 1 tablet (500 mg total) by mouth 4 (four) times a day, Disp: 60 tablet, Rfl: 2    Multiple Vitamin (multivitamin) tablet, Take 1 tablet by mouth daily, Disp: , Rfl:     tadalafil (CIALIS) 2.5 MG tablet, Take 1 tablet (2.5 mg total) by mouth daily, Disp: 90 tablet, Rfl: 2    No Known Allergies    Physical Exam:   Vitals:    01/10/24 1522   BP: 118/74   Pulse: 75   Resp: 20   Temp: (!) 97.3 °F (36.3 °C)   SpO2: 97%     General: Awake, Alert, Oriented x 3, Mood and affect appropriate  Respiratory: Respirations even and  unlabored  Cardiovascular: Peripheral pulses intact; no edema  Musculoskeletal Exam: left low back pain    ASA Score: 2    Patient/Chart Verification  Patient ID Verified: Verbal  ID Band Applied: No  Consents Confirmed: Procedural, To be obtained in the Pre-Procedure area  H&P( within 30 days) Verified: To be obtained in the Pre-Procedure area  Interval H&P(within 24 hr) Complete (required for Outpatients and Surgery Admit only): To be obtained in the Pre-Procedure area  Allergies Reviewed: Yes  Anticoag/NSAID held?: NA  Currently on antibiotics?: No    Assessment:   1. Myofascial pain syndrome    2. Sacroiliitis (HCC)        Plan: Flouro Guided TPI at the S1-S2 Junction ([id number])

## 2024-01-11 NOTE — PROGRESS NOTES
1/16/2024      No chief complaint on file.        Assessment and Plan    48 y.o. male managed by Dr. Armenta    1. BPH with irritative/obstructive symptoms  Recent MRI completed for patient's back pain showed incidental finding of enlarged prostate  AUA score 27 today in office  PSA from 5/26/23 was 0.64   Patient is currently not on any medications for his prostate/bladder  Prescription for Flomax 0.4 mg sent to patient's pharmacy, side effects discussed with patient   Patient educated that if there are no change in symptoms in 1 month to contact office so that finasteride can be added to regiment  Follow-up in 6 months with repeat PSA and to discuss urinary symptoms    2. Low testosterone  Patient was prescribed Clomid and the past but states he never took the medication due to cost   Most recent testosterone level was obtained in October 2023 which showed a total testosterone of 342 (normal), and a free testosterone level of 6.3 (slightly below normal).  Since patient's total testosterone is within normal limits Clomid is the only form of testosterone we recommend at this time.  At this time patient is not interested in medication, continue to monitor    3. Erectile Dysfunction   Continue taking Cialis 2.5 mg daily as prescribed  No further intervention needed at this time, continue to monitor      History of Present Illness  Rojelio Lorenzo is a 48 y.o. male here for evaluation of BPH, low testosterone, and erectile dysfunction.  Patient was evaluated by Dr. Armenta in the past.  Patient's last appointment was 5/9/2023 and at that time he was diagnosed with low testosterone levels.  Total testosterone was at 270 and free testosterone at 6.5.  Patients symptoms included significant fatigue, concentration issues, sleep disturbance, libido changes, and erectile dysfunction.  At that time patient was given a prescription for Clomid but reported not taking the medication due to cost.    Patient reached out to  Dr. Armenta via patient message in October 2023 requesting testosterone cream.  Dr. Armenta informed the patient that based on his most recent testosterone labs (1012/23) his total testosterone was normal (342) and his free testosterone was only slightly below the average level (6.3). Due to his total testosterone being normal, the only form of testosterone supplementation recommended would be Clomid to increase the body's own production of testosterone. * patient is still not interested in medication at this time due to cost *    Patient is taking Cialis 2.5 mg daily for erectile dysfunction due to being on antidepressant medication.  Patient is content with its results and does not need any refills at this time.  Continue to monitor.     Patient presents today for BPH which was incidentally found on recent MRI done for lumbar back pain.  Patient has never been on medication for his prostate/bladder in the past. AUA score 27 in office today.  Patient states he is experiencing urinary frequency and urgency throughout the day.  As soon as he has a drink of water he automatically has to use the bathroom.  He also describes hesitancy and weakening of his urinary stream.  At times he does not feel like he empties his bladder fully and there is dribbling at the end of his urinary stream from time to time. Nocturia at least 1x nightly. Patient denies dysuria, hematuria, incontinence, or pain in the bladder/bilateral flanks.  At this time prescription for Flomax 0.4 mg daily was sent to the patient's pharmacy in order for him to trial.  Patient told that if medication does not have any effect within 1 month that he should reach out to office so that finasteride can be added to medication regiment.  Plan to follow-up in 6 months with repeat PSA as well as updated AUA score to ensure medication is working.      Review of Systems   Constitutional:  Negative for activity change and fever.   Gastrointestinal:  Negative  "for abdominal distention, constipation, diarrhea, nausea and vomiting.   Genitourinary:  Positive for difficulty urinating, frequency and urgency. Negative for decreased urine volume, dysuria, flank pain, hematuria, penile pain and testicular pain.        Difficulty emptying  Hesitancy and weak stream   Nocturia at least 1x nightly    Musculoskeletal:  Positive for back pain (chronic).   Psychiatric/Behavioral: Negative.          On antidepression medication most likely cause erectile dysfunction       AUA SYMPTOM SCORE      Flowsheet Row Most Recent Value   AUA SYMPTOM SCORE    How often have you had a sensation of not emptying your bladder completely after you finished urinating? 4 (P)    How often have you had to urinate again less than two hours after you finished urinating? 5 (P)    How often have you found you stopped and started again several times when you urinate? 5 (P)    How often have you found it difficult to postpone urination? 4 (P)    How often have you had a weak urinary stream? 2 (P)    How often have you had to push or strain to begin urination? 2 (P)    How many times did you most typically get up to urinate from the time you went to bed at night until the time you got up in the morning? 5 (P)    Quality of Life: If you were to spend the rest of your life with your urinary condition just the way it is now, how would you feel about that? 5 (P)    AUA SYMPTOM SCORE 27 (P)             Vitals  Vitals:    01/16/24 1436   BP: 124/80   Pulse: 80   SpO2: 97%   Weight: 84.8 kg (187 lb)   Height: 5' 10\" (1.778 m)       Physical Exam  Constitutional:       Appearance: Normal appearance.   HENT:      Head: Normocephalic and atraumatic.      Mouth/Throat:      Pharynx: Oropharynx is clear.   Eyes:      Extraocular Movements: Extraocular movements intact.      Conjunctiva/sclera: Conjunctivae normal.   Cardiovascular:      Rate and Rhythm: Normal rate.   Pulmonary:      Effort: Pulmonary effort is normal. "   Abdominal:      General: Abdomen is flat. There is no distension.      Palpations: Abdomen is soft.      Tenderness: There is no abdominal tenderness. There is no right CVA tenderness or left CVA tenderness.   Musculoskeletal:         General: Normal range of motion.      Cervical back: Normal range of motion.   Skin:     General: Skin is warm and dry.   Neurological:      General: No focal deficit present.      Mental Status: He is alert and oriented to person, place, and time.   Psychiatric:         Mood and Affect: Mood normal.         Behavior: Behavior normal.       Past History  Past Medical History:   Diagnosis Date    Anxiety     Bertolotti's syndrome 2020    Depression     ED (erectile dysfunction)     History of Graves' disease     s/p Tapazole x 2 years, then radioactive iodine, which resulted in euthyroid status    Hyperlipidemia     Hypertension     Low testosterone 2023    Lumbar spondylosis 2020    Myofascial pain syndrome of lumbar spine 2020    Overweight     Vitamin D deficiency      Social History     Socioeconomic History    Marital status: /Civil Union     Spouse name: None    Number of children: 1    Years of education: None    Highest education level: None   Occupational History    Occupation: Paramedic with [employer]   Tobacco Use    Smoking status: Former     Current packs/day: 0.00     Average packs/day: 1.5 packs/day for 19.0 years (28.5 ttl pk-yrs)     Types: Cigarettes     Start date: 1992     Quit date: 2011     Years since quittin.0    Smokeless tobacco: Never   Vaping Use    Vaping status: Never Used   Substance and Sexual Activity    Alcohol use: Yes     Alcohol/week: 3.0 standard drinks of alcohol     Types: 3 Standard drinks or equivalent per week     Comment: occasionally, socially    Drug use: Not Currently     Types: Marijuana     Comment: Last use 22yo    Sexual activity: Yes     Partners: Female      Birth control/protection: None   Other Topics Concern    None   Social History Narrative        Lives with wife    1 Child - 1 Daughter    Paramedic with [employer]     Social Determinants of Health     Financial Resource Strain: Not on file   Food Insecurity: Not on file   Transportation Needs: Not on file   Physical Activity: Not on file   Stress: Not on file   Social Connections: Not on file   Intimate Partner Violence: Not on file   Housing Stability: Not on file     Social History     Tobacco Use   Smoking Status Former    Current packs/day: 0.00    Average packs/day: 1.5 packs/day for 19.0 years (28.5 ttl pk-yrs)    Types: Cigarettes    Start date: 1992    Quit date: 2011    Years since quittin.0   Smokeless Tobacco Never     Family History   Problem Relation Age of Onset    Hypertension Mother     Diabetes type II Mother     Stomach cancer Mother 53    Depression Mother     Hyperlipidemia Mother     Mental illness Mother     Depression Father     Mental retardation Brother     No Known Problems Daughter        The following portions of the patient's history were reviewed and updated as appropriate: allergies, current medications, past medical history, past social history, past surgical history and problem list.    Results  No results found for this or any previous visit (from the past 1 hour(s)).]  Lab Results   Component Value Date    PSA 0.64 2023     Lab Results   Component Value Date    CALCIUM 9.9 10/12/2023    K 4.1 10/12/2023    CO2 27 10/12/2023     10/12/2023    BUN 23 10/12/2023    CREATININE 1.18 10/12/2023     Lab Results   Component Value Date    WBC 6.32 2023    HGB 16.6 2023    HCT 52.2 (H) 2023     (H) 2023     2023       Eliz Jones PA-C

## 2024-01-16 ENCOUNTER — OFFICE VISIT (OUTPATIENT)
Dept: UROLOGY | Facility: MEDICAL CENTER | Age: 49
End: 2024-01-16
Payer: COMMERCIAL

## 2024-01-16 VITALS
HEART RATE: 80 BPM | DIASTOLIC BLOOD PRESSURE: 80 MMHG | HEIGHT: 70 IN | BODY MASS INDEX: 26.77 KG/M2 | WEIGHT: 187 LBS | SYSTOLIC BLOOD PRESSURE: 124 MMHG | OXYGEN SATURATION: 97 %

## 2024-01-16 DIAGNOSIS — N40.1 BENIGN PROSTATIC HYPERPLASIA WITH URINARY FREQUENCY: Primary | ICD-10-CM

## 2024-01-16 DIAGNOSIS — R35.0 BENIGN PROSTATIC HYPERPLASIA WITH URINARY FREQUENCY: Primary | ICD-10-CM

## 2024-01-16 DIAGNOSIS — R79.89 LOW TESTOSTERONE IN MALE: ICD-10-CM

## 2024-01-16 DIAGNOSIS — Z12.5 PROSTATE CANCER SCREENING: ICD-10-CM

## 2024-01-16 PROCEDURE — 99214 OFFICE O/P EST MOD 30 MIN: CPT

## 2024-01-16 RX ORDER — TAMSULOSIN HYDROCHLORIDE 0.4 MG/1
0.4 CAPSULE ORAL
Qty: 90 CAPSULE | Refills: 3 | Status: SHIPPED | OUTPATIENT
Start: 2024-01-16

## 2024-01-17 ENCOUNTER — TELEPHONE (OUTPATIENT)
Dept: RADIOLOGY | Facility: MEDICAL CENTER | Age: 49
End: 2024-01-17

## 2024-01-18 ENCOUNTER — TELEPHONE (OUTPATIENT)
Dept: OBGYN CLINIC | Facility: HOSPITAL | Age: 49
End: 2024-01-18

## 2024-01-18 NOTE — TELEPHONE ENCOUNTER
Caller: Bj  Doctor/office: dr hays   CB#: [phone]    % of improvement: 0%  Pain Scale (1-10): 8/10    Pt stated that his spasms are worse and it woke him up out of his sleep. Pt advised that he had to leave work early because of bad they are.

## 2024-01-18 NOTE — TELEPHONE ENCOUNTER
Caller: Patient    Doctor/Office: Erickson    Call regarding :  Returning call to [facility].      Call was transferred to: [facility]

## 2024-01-23 LAB
APOB+LDLR+PCSK9 GENE MUT ANL BLD/T: NOT DETECTED
BRCA1+BRCA2 DEL+DUP + FULL MUT ANL BLD/T: NOT DETECTED
MLH1+MSH2+MSH6+PMS2 GN DEL+DUP+FUL M: NOT DETECTED

## 2024-02-06 ENCOUNTER — TELEPHONE (OUTPATIENT)
Age: 49
End: 2024-02-06

## 2024-02-06 NOTE — TELEPHONE ENCOUNTER
Caller: Rojelio Lorenzo    Doctor/Office: Dr. Crowell/Leslie    #: [phone]    Escalation: Surgery/was to come on 2/12/24 to have pre op visit but now wants to RS SX to May if possible. Please call pt back and advise-I did not cancel his appt for 2/12/24. Thanks

## 2024-02-09 DIAGNOSIS — G89.29 CHRONIC BILATERAL LOW BACK PAIN WITHOUT SCIATICA: Primary | ICD-10-CM

## 2024-02-09 DIAGNOSIS — M54.50 CHRONIC BILATERAL LOW BACK PAIN WITHOUT SCIATICA: Primary | ICD-10-CM

## 2024-02-12 ENCOUNTER — OFFICE VISIT (OUTPATIENT)
Dept: PODIATRY | Facility: CLINIC | Age: 49
End: 2024-02-12
Payer: COMMERCIAL

## 2024-02-12 VITALS
WEIGHT: 180 LBS | SYSTOLIC BLOOD PRESSURE: 122 MMHG | BODY MASS INDEX: 25.77 KG/M2 | DIASTOLIC BLOOD PRESSURE: 82 MMHG | HEIGHT: 70 IN

## 2024-02-12 DIAGNOSIS — M79.672 PAIN IN LEFT FOOT: ICD-10-CM

## 2024-02-12 DIAGNOSIS — M20.12 HALLUX VALGUS OF LEFT FOOT: Primary | ICD-10-CM

## 2024-02-12 PROCEDURE — 99214 OFFICE O/P EST MOD 30 MIN: CPT | Performed by: PODIATRIST

## 2024-02-12 NOTE — PROGRESS NOTES
Assessment/Plan:   Treatment options for left foot hallux valgus deformity discussed with patient in detail.  Conservative versus surgical treatment options reviewed.  Patient has requested surgical intervention sometime in May.  Please accept this note as podiatry preop H&P  Most current lab work and clinical notes reviewed  Radiographs personally reviewed from 9/26/2023 and reviewed with patient in detail.  IM angle of 15.7 degrees noted will necessitate a proximal osteotomy to adequately correct the deformity.  Lapidus bunionectomy procedure reviewed on x-rays with patient.  Surgical consent reviewed in detail and signed.  All risk benefits alternatives and possible complications to surgical correction of patient's left foot bunion deformity were reviewed in detail.  No promises have been made with regards to the anticipated surgical results.  Possible complications consisting of delayed or nonhealing of arthrodesis site along with infection scarring and numbness reviewed with patient.  Chances of recurrence reviewed.  Plan for Left Foot Lapidus bunionectomy on May 8, 2023 at [facility] as an outpatient.    Plan for General/LMA anesthesia.  Patient will obtain preop H&P from primary care physician     Diagnoses and all orders for this visit:    Hallux valgus of left foot  -     Case request operating room: BUNIONECTOMY LAPIDIS LEFT FOOT; Standing  -     Ambulatory referral to Family Practice; Future  -     Basic metabolic panel; Future  -     Case request operating room: BUNIONECTOMY LAPIDIS LEFT FOOT    Pain in left foot    Other orders  -     Notify physician; Standing  -     Diet NPO; Sips with meds; Standing  -     Height and weight upon arrival; Standing  -     Nursing Communication Warmimg Interventions Implemented; Standing  -     Nursing Communication CHG bath, have staff wash entire body (neck down) per pre-op bathing protocol. Routine, evening prior to, and day of surgery.; Standing  -     Nursing  Communication Swab both nares with Povidone-Iodine solution, EXCLUDE if patient has shellfish/Iodine allergy. Routine, day of surgery, on call to OR; Standing  -     chlorhexidine (PERIDEX) 0.12 % oral rinse 15 mL  -     Void on call to OR; Standing  -     Insert peripheral IV; Standing  -     acetaminophen (TYLENOL) tablet 975 mg  -     chlorhexidine (HIBICLENS) 4 % topical liquid  -     ceFAZolin (ANCEF) 2,000 mg in dextrose 5 % 100 mL IVPB          Subjective:     Patient ID: Rojelio Lorenzo is a 48 y.o. male.    2/12/2024: 48-year-old male presents today for preop discussion with anticipation of having a surgical correction of his left foot bunion deformity.  Patient request that we schedule surgery for sometime in early May 2024.  Reports no new acute issues but continues to have daily pain and discomfort from his left foot secondary to shoe gear irritation and rubbing of his bunion deformity.    11/10/2023: Patient presents for follow-up evaluation of Achilles tendinitis left foot which is 95% resolved and reports he still has moderate pain from his left bunion deformity.  Patient states he would like to get his bunion fixed sometime in March and April 2024.    9/26/2023: 48-year-old male presents today concerned with left great toe joint pain in addition to his left Achilles tendinitis which he is going to PT for.  Patient is an active runner.  Denies any injury.        Review of Systems   Constitutional: Negative.    HENT: Negative.     Eyes: Negative.    Respiratory: Negative.     Cardiovascular: Negative.    Gastrointestinal: Negative.    Endocrine: Negative.    Genitourinary: Negative.    Musculoskeletal:         Moderate painful bunion deformity left foot   Skin: Negative.    Allergic/Immunologic: Negative.    Neurological: Negative.    Hematological: Negative.    Psychiatric/Behavioral: Negative.           Objective:     Physical Exam  Constitutional:       Appearance: Normal appearance.   HENT:       Head: Normocephalic.      Right Ear: Tympanic membrane normal.      Left Ear: Tympanic membrane normal.      Nose: No congestion.      Mouth/Throat:      Pharynx: No oropharyngeal exudate or posterior oropharyngeal erythema.   Eyes:      Conjunctiva/sclera: Conjunctivae normal.      Pupils: Pupils are equal, round, and reactive to light.   Cardiovascular:      Rate and Rhythm: Normal rate and regular rhythm.      Pulses: Normal pulses.           Dorsalis pedis pulses are 2+ on the right side and 2+ on the left side.        Posterior tibial pulses are 2+ on the right side and 2+ on the left side.   Pulmonary:      Effort: Pulmonary effort is normal.   Musculoskeletal:         General: Tenderness and deformity present.      Right foot: Bunion present.      Comments: Moderate bunion deformity left foot, x-rays reviewed show 15.7 degree IM angle.  Sesamoid position #4, clinically prominent medial first MPJ.   Feet:      Right foot:      Protective Sensation: 10 sites tested.  10 sites sensed.      Skin integrity: Skin integrity normal.      Left foot:      Protective Sensation: 10 sites tested.  10 sites sensed.      Skin integrity: Skin integrity normal.   Skin:     General: Skin is warm and dry.      Capillary Refill: Capillary refill takes 2 to 3 seconds.      Coloration: Skin is not pale.      Findings: No bruising, erythema, lesion or rash.   Neurological:      General: No focal deficit present.      Mental Status: He is alert.      Cranial Nerves: No cranial nerve deficit.      Sensory: No sensory deficit.      Motor: No weakness.      Gait: Gait normal.      Deep Tendon Reflexes: Reflexes normal.   Psychiatric:         Mood and Affect: Mood normal.         Behavior: Behavior normal.         Judgment: Judgment normal.

## 2024-02-16 ENCOUNTER — LAB (OUTPATIENT)
Dept: LAB | Facility: CLINIC | Age: 49
End: 2024-02-16
Payer: COMMERCIAL

## 2024-02-16 DIAGNOSIS — Z13.6 SCREENING FOR CARDIOVASCULAR CONDITION: ICD-10-CM

## 2024-02-16 DIAGNOSIS — F41.9 ANXIETY: ICD-10-CM

## 2024-02-16 DIAGNOSIS — E66.3 OVERWEIGHT: ICD-10-CM

## 2024-02-16 DIAGNOSIS — F32.0 CURRENT MILD EPISODE OF MAJOR DEPRESSIVE DISORDER, UNSPECIFIED WHETHER RECURRENT (HCC): ICD-10-CM

## 2024-02-16 DIAGNOSIS — E55.9 VITAMIN D DEFICIENCY: ICD-10-CM

## 2024-02-16 DIAGNOSIS — I10 PRIMARY HYPERTENSION: ICD-10-CM

## 2024-02-16 DIAGNOSIS — E78.5 HYPERLIPIDEMIA, UNSPECIFIED HYPERLIPIDEMIA TYPE: ICD-10-CM

## 2024-02-16 DIAGNOSIS — Z13.1 SCREENING FOR DIABETES MELLITUS: ICD-10-CM

## 2024-02-16 LAB
25(OH)D3 SERPL-MCNC: 36.1 NG/ML (ref 30–100)
ALBUMIN SERPL BCP-MCNC: 4.4 G/DL (ref 3.5–5)
ALP SERPL-CCNC: 54 U/L (ref 34–104)
ALT SERPL W P-5'-P-CCNC: 14 U/L (ref 7–52)
ANION GAP SERPL CALCULATED.3IONS-SCNC: 4 MMOL/L
AST SERPL W P-5'-P-CCNC: 14 U/L (ref 13–39)
BASOPHILS # BLD AUTO: 0.04 THOUSANDS/ÂΜL (ref 0–0.1)
BASOPHILS NFR BLD AUTO: 1 % (ref 0–1)
BILIRUB SERPL-MCNC: 0.71 MG/DL (ref 0.2–1)
BUN SERPL-MCNC: 20 MG/DL (ref 5–25)
CALCIUM SERPL-MCNC: 9.4 MG/DL (ref 8.4–10.2)
CHLORIDE SERPL-SCNC: 105 MMOL/L (ref 96–108)
CHOLEST SERPL-MCNC: 179 MG/DL
CO2 SERPL-SCNC: 30 MMOL/L (ref 21–32)
CREAT SERPL-MCNC: 1.02 MG/DL (ref 0.6–1.3)
EOSINOPHIL # BLD AUTO: 0.05 THOUSAND/ÂΜL (ref 0–0.61)
EOSINOPHIL NFR BLD AUTO: 1 % (ref 0–6)
ERYTHROCYTE [DISTWIDTH] IN BLOOD BY AUTOMATED COUNT: 12.8 % (ref 11.6–15.1)
EST. AVERAGE GLUCOSE BLD GHB EST-MCNC: 108 MG/DL
GFR SERPL CREATININE-BSD FRML MDRD: 85 ML/MIN/1.73SQ M
GLUCOSE P FAST SERPL-MCNC: 90 MG/DL (ref 65–99)
HBA1C MFR BLD: 5.4 %
HCT VFR BLD AUTO: 47.5 % (ref 36.5–49.3)
HDLC SERPL-MCNC: 37 MG/DL
HGB BLD-MCNC: 15.7 G/DL (ref 12–17)
IMM GRANULOCYTES # BLD AUTO: 0.03 THOUSAND/UL (ref 0–0.2)
IMM GRANULOCYTES NFR BLD AUTO: 1 % (ref 0–2)
LDLC SERPL CALC-MCNC: 99 MG/DL (ref 0–100)
LDLC SERPL DIRECT ASSAY-MCNC: 121 MG/DL (ref 0–100)
LYMPHOCYTES # BLD AUTO: 2.31 THOUSANDS/ÂΜL (ref 0.6–4.47)
LYMPHOCYTES NFR BLD AUTO: 39 % (ref 14–44)
MCH RBC QN AUTO: 31.8 PG (ref 26.8–34.3)
MCHC RBC AUTO-ENTMCNC: 33.1 G/DL (ref 31.4–37.4)
MCV RBC AUTO: 96 FL (ref 82–98)
MONOCYTES # BLD AUTO: 0.64 THOUSAND/ÂΜL (ref 0.17–1.22)
MONOCYTES NFR BLD AUTO: 11 % (ref 4–12)
NEUTROPHILS # BLD AUTO: 2.87 THOUSANDS/ÂΜL (ref 1.85–7.62)
NEUTS SEG NFR BLD AUTO: 47 % (ref 43–75)
NONHDLC SERPL-MCNC: 142 MG/DL
NRBC BLD AUTO-RTO: 0 /100 WBCS
PLATELET # BLD AUTO: 195 THOUSANDS/UL (ref 149–390)
PMV BLD AUTO: 10.9 FL (ref 8.9–12.7)
POTASSIUM SERPL-SCNC: 4.1 MMOL/L (ref 3.5–5.3)
PROT SERPL-MCNC: 7.1 G/DL (ref 6.4–8.4)
RBC # BLD AUTO: 4.93 MILLION/UL (ref 3.88–5.62)
SODIUM SERPL-SCNC: 139 MMOL/L (ref 135–147)
TRIGL SERPL-MCNC: 215 MG/DL
TSH SERPL DL<=0.05 MIU/L-ACNC: 4.3 UIU/ML (ref 0.45–4.5)
WBC # BLD AUTO: 5.94 THOUSAND/UL (ref 4.31–10.16)

## 2024-02-16 PROCEDURE — 80061 LIPID PANEL: CPT

## 2024-02-16 PROCEDURE — 85025 COMPLETE CBC W/AUTO DIFF WBC: CPT

## 2024-02-16 PROCEDURE — 82306 VITAMIN D 25 HYDROXY: CPT

## 2024-02-16 PROCEDURE — 80053 COMPREHEN METABOLIC PANEL: CPT

## 2024-02-16 PROCEDURE — 84443 ASSAY THYROID STIM HORMONE: CPT

## 2024-02-16 PROCEDURE — 83721 ASSAY OF BLOOD LIPOPROTEIN: CPT

## 2024-02-16 PROCEDURE — 83036 HEMOGLOBIN GLYCOSYLATED A1C: CPT

## 2024-02-16 PROCEDURE — 36415 COLL VENOUS BLD VENIPUNCTURE: CPT

## 2024-02-16 NOTE — RESULT ENCOUNTER NOTE
Stable labs - will review with patient at upcoming appointment.    Hyperlipidemia - Recommend lifestyle modifications.    The 10-year ASCVD risk score (Dee KING, et al., 2019) is: 4%    Values used to calculate the score:      Age: 48 years      Sex: Male      Is Non- : No      Diabetic: No      Tobacco smoker: No      Systolic Blood Pressure: 122 mmHg      Is BP treated: Yes      HDL Cholesterol: 37 mg/dL      Total Cholesterol: 179 mg/dL

## 2024-02-17 RX ORDER — CHLORHEXIDINE GLUCONATE 4 G/100ML
SOLUTION TOPICAL DAILY PRN
OUTPATIENT
Start: 2024-02-17

## 2024-02-17 RX ORDER — CHLORHEXIDINE GLUCONATE ORAL RINSE 1.2 MG/ML
15 SOLUTION DENTAL ONCE
OUTPATIENT
Start: 2024-02-17 | End: 2024-02-17

## 2024-02-17 RX ORDER — ACETAMINOPHEN 325 MG/1
975 TABLET ORAL ONCE
OUTPATIENT
Start: 2024-02-17 | End: 2024-02-17

## 2024-02-21 ENCOUNTER — OFFICE VISIT (OUTPATIENT)
Dept: FAMILY MEDICINE CLINIC | Facility: CLINIC | Age: 49
End: 2024-02-21
Payer: COMMERCIAL

## 2024-02-21 VITALS
TEMPERATURE: 97.7 F | SYSTOLIC BLOOD PRESSURE: 108 MMHG | HEIGHT: 70 IN | WEIGHT: 185.8 LBS | HEART RATE: 75 BPM | OXYGEN SATURATION: 98 % | RESPIRATION RATE: 16 BRPM | DIASTOLIC BLOOD PRESSURE: 66 MMHG | BODY MASS INDEX: 26.6 KG/M2

## 2024-02-21 DIAGNOSIS — I10 PRIMARY HYPERTENSION: ICD-10-CM

## 2024-02-21 DIAGNOSIS — N40.1 BENIGN PROSTATIC HYPERPLASIA WITH URINARY FREQUENCY: ICD-10-CM

## 2024-02-21 DIAGNOSIS — E78.5 HYPERLIPIDEMIA, UNSPECIFIED HYPERLIPIDEMIA TYPE: ICD-10-CM

## 2024-02-21 DIAGNOSIS — F41.9 ANXIETY: ICD-10-CM

## 2024-02-21 DIAGNOSIS — M79.18 MYOFASCIAL PAIN SYNDROME: ICD-10-CM

## 2024-02-21 DIAGNOSIS — M53.3 COCCYDYNIA: ICD-10-CM

## 2024-02-21 DIAGNOSIS — Z12.83 SKIN CANCER SCREENING: ICD-10-CM

## 2024-02-21 DIAGNOSIS — Z00.00 ANNUAL PHYSICAL EXAM: Primary | ICD-10-CM

## 2024-02-21 DIAGNOSIS — R79.89 LOW TESTOSTERONE: ICD-10-CM

## 2024-02-21 DIAGNOSIS — Q76.49 BERTOLOTTI'S SYNDROME: ICD-10-CM

## 2024-02-21 DIAGNOSIS — E66.3 OVERWEIGHT: ICD-10-CM

## 2024-02-21 DIAGNOSIS — F32.0 CURRENT MILD EPISODE OF MAJOR DEPRESSIVE DISORDER, UNSPECIFIED WHETHER RECURRENT (HCC): ICD-10-CM

## 2024-02-21 DIAGNOSIS — M47.816 LUMBAR SPONDYLOSIS: ICD-10-CM

## 2024-02-21 DIAGNOSIS — R35.0 BENIGN PROSTATIC HYPERPLASIA WITH URINARY FREQUENCY: ICD-10-CM

## 2024-02-21 DIAGNOSIS — N52.8 OTHER MALE ERECTILE DYSFUNCTION: ICD-10-CM

## 2024-02-21 DIAGNOSIS — E55.9 VITAMIN D DEFICIENCY: ICD-10-CM

## 2024-02-21 PROCEDURE — 99396 PREV VISIT EST AGE 40-64: CPT | Performed by: FAMILY MEDICINE

## 2024-02-21 PROCEDURE — 99213 OFFICE O/P EST LOW 20 MIN: CPT | Performed by: FAMILY MEDICINE

## 2024-02-21 RX ORDER — FLUOXETINE HYDROCHLORIDE 40 MG/1
40 CAPSULE ORAL DAILY
Qty: 90 CAPSULE | Refills: 2 | Status: SHIPPED | OUTPATIENT
Start: 2024-02-21

## 2024-02-21 RX ORDER — GLUCOSAMINE/D3/BOSWELLIA SERRA 1500MG-400
2 TABLET ORAL DAILY
COMMUNITY

## 2024-02-21 NOTE — PATIENT INSTRUCTIONS
Please contact your insurance if you are uncertain of coverage for plan of care items.    Your insurance may not cover the cost of your Vitamin D blood test, which is approximately $65-70.  Please notify the lab prior to blood draw if you would like to decline this test.      Try [facility] in [employer] or [employer] on [facility]-dermatology department.    Weight Management   AMBULATORY CARE:   Why it is important to manage your weight:  Being overweight increases your risk of health conditions such as heart disease, high blood pressure, type 2 diabetes, and certain types of cancer. It can also increase your risk for osteoarthritis, sleep apnea, and other respiratory problems. Aim for a slow, steady weight loss. Even a small amount of weight loss can lower your risk of health problems.  Risks of being overweight:  Extra weight can cause many health problems, including the following:  Diabetes (high blood sugar level)    High blood pressure or high cholesterol    Heart disease    Stroke    Gallbladder or liver disease    Cancer of the colon, breast, prostate, liver, or kidney    Sleep apnea    Arthritis or gout    Screening  is done to check for health conditions before you have signs or symptoms. If you are 35 to 70 years old, your blood sugar level may be checked every 3 years for signs of prediabetes or diabetes. Your healthcare provider will check your blood pressure at each visit. High blood pressure can lead to a stroke or other problems. Your provider may check for signs of heart disease, cancer, or other health problems.  How to lose weight safely:  A safe and healthy way to lose weight is to eat fewer calories and get regular exercise.  You can lose up about 1 pound a week by decreasing the number of calories you eat by 500 calories each day. You can decrease calories by eating smaller portion sizes or by cutting out high-calorie foods. Read labels to find out how many calories are  in the foods you eat.         You can also burn calories with exercise such as walking, swimming, or biking. You will be more likely to keep weight off if you make these changes part of your lifestyle. Exercise at least 30 minutes per day on most days of the week. You can also fit in more physical activity by taking the stairs instead of the elevator or parking farther away from stores. Ask your healthcare provider about the best exercise plan for you.       Healthy meal plan for weight management:  A healthy meal plan includes a variety of foods, contains fewer calories, and helps you stay healthy. A healthy meal plan includes the following:     Eat whole-grain foods more often.  A healthy meal plan should contain fiber. Fiber is the part of grains, fruits, and vegetables that is not broken down by your body. Whole-grain foods are healthy and provide extra fiber in your diet. Some examples of whole-grain foods are whole-wheat breads and pastas, oatmeal, brown rice, and bulgur.    Eat a variety of vegetables every day.  Include dark, leafy greens such as spinach, kale, tsering greens, and mustard greens. Eat yellow and orange vegetables such as carrots, sweet potatoes, and winter squash.     Eat a variety of fruits every day.  Choose fresh or canned fruit (canned in its own juice or light syrup) instead of juice. Fruit juice has very little or no fiber.    Eat low-fat dairy foods.  Drink fat-free (skim) milk or 1% milk. Eat fat-free yogurt and low-fat cottage cheese. Try low-fat cheeses such as mozzarella and other reduced-fat cheeses.    Choose meat and other protein foods that are low in fat.  Choose beans or other legumes such as split peas or lentils. Choose fish, skinless poultry (chicken or turkey), or lean cuts of red meat (beef or pork). Before you cook meat or poultry, cut off any visible fat.     Use less fat and oil.  Try baking foods instead of frying them. Add less fat, such as margarine, sour cream,  regular salad dressing and mayonnaise to foods. Eat fewer high-fat foods. Some examples of high-fat foods include french fries, doughnuts, ice cream, and cakes.    Eat fewer sweets.  Limit foods and drinks that are high in sugar. This includes candy, cookies, regular soda, and sweetened drinks.  Ways to decrease calories:   Eat smaller portions.     Use a small plate with smaller servings.    Do not eat second helpings.    When you eat at a restaurant, ask for a box and place half of your meal in the box before you eat.    Share an entrée with someone else.    Replace high-calorie snacks with healthy, low-calorie snacks.     Choose fresh fruit, vegetables, fat-free rice cakes, or air-popped popcorn instead of potato chips, nuts, or chocolate.    Choose water or calorie-free drinks instead of soda or sweetened drinks.    Do not shop for groceries when you are hungry.  You may be more likely to make unhealthy food choices. Take a grocery list of healthy foods and shop after you have eaten.    Eat regular meals. Do not skip meals. Skipping meals can lead to overeating later in the day. This can make it harder for you to lose weight. Eat a healthy snack in place of a meal if you do not have time to eat a regular meal. Talk with a dietitian to help you create a meal plan and schedule that is right for you.    Other things to consider as you try to lose weight:   Be aware of situations that may give you the urge to overeat, such as eating while watching television. Find ways to avoid these situations. For example, read a book, go for a walk, or do crafts.    Meet with a weight loss support group or friends who are also trying to lose weight. This may help you stay motivated to continue working on your weight loss goals.    © Copyright [employer] 2023 Information is for End User's use only and may not be sold, redistributed or otherwise used for commercial purposes.  The above information is an  only. It is  not intended as medical advice for individual conditions or treatments. Talk to your doctor, nurse or pharmacist before following any medical regimen to see if it is safe and effective for you.    Wellness Visit for Adults   AMBULATORY CARE:   A wellness visit  is when you see your healthcare provider to get screened for health problems. Your healthcare provider will also give you advice on how to stay healthy. Write down your questions so you remember to ask them. Ask your healthcare provider how often you should have a wellness visit.  What happens at a wellness visit:  Your healthcare provider will ask about your health, and your family history of health problems. This includes high blood pressure, heart disease, and cancer. He or she will ask if you have symptoms that concern you, if you smoke, and about your mood. You may also be asked about your intake of medicines, supplements, food, and alcohol. Any of the following may be done:  Your weight  will be checked. Your height may also be checked so your body mass index (BMI) can be calculated. Your BMI shows if you are at a healthy weight.    Your blood pressure  and heart rate will be checked. Your temperature may also be checked.    Blood and urine tests  may be done. Blood tests may be done to check your cholesterol levels. Abnormal cholesterol levels increase your risk for heart disease and stroke. You may also need a blood or urine test to check for diabetes if you are at increased risk. Urine tests may be done to look for signs of an infection or kidney disease.    A physical exam  includes checking your heartbeat and lungs with a stethoscope. Your healthcare provider may also check your skin to look for sun damage.    Screening tests  may be recommended. A screening test is done to check for diseases that may not cause symptoms. The screening tests you may need depend on your age, gender, family history, and lifestyle habits. For example, colorectal  screening may be recommended if you are 50 years old or older.    Screening tests you need if you are a woman:   A Pap smear  is used to screen for cervical cancer. Pap smears are usually done every 3 to 5 years depending on your age. You may need them more often if you have had abnormal Pap smear test results in the past. Ask your healthcare provider how often you should have a Pap smear.    A mammogram  is an x-ray of your breasts to screen for breast cancer. Experts recommend mammograms every 2 years starting at age 50 years. You may need a mammogram at age 49 years or younger if you have an increased risk for breast cancer. Talk to your healthcare provider about when you should start having mammograms and how often you need them.    Vaccines you may need:   Get an influenza vaccine  every year. The influenza vaccine protects you from the flu. Several types of viruses cause the flu. The viruses change over time, so new vaccines are made each year.    Get a tetanus-diphtheria (Td) booster vaccine  every 10 years. This vaccine protects you against tetanus and diphtheria. Tetanus is a severe infection that may cause painful muscle spasms and lockjaw. Diphtheria is a severe bacterial infection that causes a thick covering in the back of your mouth and throat.    Get a human papillomavirus (HPV) vaccine  if you are female and aged 19 to 26 or male 19 to 21 and never received it. This vaccine protects you from HPV infection. HPV is the most common infection spread by sexual contact. HPV may also cause vaginal, penile, and anal cancers.    Get a pneumococcal vaccine  if you are aged 65 years or older. The pneumococcal vaccine is an injection given to protect you from pneumococcal disease. Pneumococcal disease is an infection caused by pneumococcal bacteria. The infection may cause pneumonia, meningitis, or an ear infection.    Get a shingles vaccine  if you are 60 or older, even if you have had shingles before. The  shingles vaccine is an injection to protect you from the varicella-zoster virus. This is the same virus that causes chickenpox. Shingles is a painful rash that develops in people who had chickenpox or have been exposed to the virus.    How to eat healthy:  My Plate is a model for planning healthy meals. It shows the types and amounts of foods that should go on your plate. Fruits and vegetables make up about half of your plate, and grains and protein make up the other half. A serving of dairy is included on the side of your plate. The amount of calories and serving sizes you need depends on your age, gender, weight, and height. Examples of healthy foods are listed below:  Eat a variety of vegetables  such as dark green, red, and orange vegetables. You can also include canned vegetables low in sodium (salt) and frozen vegetables without added butter or sauces.    Eat a variety of fresh fruits , canned fruit in 100% juice, frozen fruit, and dried fruit.    Include whole grains.  At least half of the grains you eat should be whole grains. Examples include whole-wheat bread, wheat pasta, brown rice, and whole-grain cereals such as oatmeal.    Eat a variety of protein foods such as seafood (fish and shellfish), lean meat, and poultry without skin (turkey and chicken). Examples of lean meats include pork leg, shoulder, or tenderloin, and beef round, sirloin, tenderloin, and extra lean ground beef. Other protein foods include eggs and egg substitutes, beans, peas, soy products, nuts, and seeds.    Choose low-fat dairy products such as skim or 1% milk or low-fat yogurt, cheese, and cottage cheese.    Limit unhealthy fats  such as butter, hard margarine, and shortening.       Exercise:  Exercise at least 30 minutes per day on most days of the week. Some examples of exercise include walking, biking, dancing, and swimming. You can also fit in more physical activity by taking the stairs instead of the elevator or parking farther  away from stores. Include muscle strengthening activities 2 days each week. Regular exercise provides many health benefits. It helps you manage your weight, and decreases your risk for type 2 diabetes, heart disease, stroke, and high blood pressure. Exercise can also help improve your mood. Ask your healthcare provider about the best exercise plan for you.       General health and safety guidelines:   Do not smoke.  Nicotine and other chemicals in cigarettes and cigars can cause lung damage. Ask your healthcare provider for information if you currently smoke and need help to quit. E-cigarettes or smokeless tobacco still contain nicotine. Talk to your healthcare provider before you use these products.    Limit alcohol.  A drink of alcohol is 12 ounces of beer, 5 ounces of wine, or 1½ ounces of liquor.    Lose weight, if needed.  Being overweight increases your risk of certain health conditions. These include heart disease, high blood pressure, type 2 diabetes, and certain types of cancer.    Protect your skin.  Do not sunbathe or use tanning beds. Use sunscreen with a SPF 15 or higher. Apply sunscreen at least 15 minutes before you go outside. Reapply sunscreen every 2 hours. Wear protective clothing, hats, and sunglasses when you are outside.    Drive safely.  Always wear your seatbelt. Make sure everyone in your car wears a seatbelt. A seatbelt can save your life if you are in an accident. Do not use your cell phone when you are driving. This could distract you and cause an accident. Pull over if you need to make a call or send a text message.    Practice safe sex.  Use latex condoms if are sexually active and have more than one partner. Your healthcare provider may recommend screening tests for sexually transmitted infections (STIs).    Wear helmets, lifejackets, and protective gear.  Always wear a helmet when you ride a bike or motorcycle, go skiing, or play sports that could cause a head injury. Wear protective  equipment when you play sports. Wear a lifejacket when you are on a boat or doing water sports.    © Copyright [employer] 2023 Information is for End User's use only and may not be sold, redistributed or otherwise used for commercial purposes.  The above information is an  only. It is not intended as medical advice for individual conditions or treatments. Talk to your doctor, nurse or pharmacist before following any medical regimen to see if it is safe and effective for you.    Cholesterol and Your Health   AMBULATORY CARE:   Cholesterol  is a waxy, fat-like substance. Your body uses cholesterol to make hormones and new cells, and to protect nerves. Cholesterol is made by your body. It also comes from certain foods you eat, such as meat and dairy products. Your healthcare provider can help you set goals for your cholesterol levels. Your provider can help you create a plan to meet your goals.  Cholesterol level goals:  Your cholesterol level goals depend on your risk for heart disease, your age, and your other health conditions. The following are general guidelines:  Total cholesterol  includes low-density lipoprotein (LDL), high-density lipoprotein (HDL), and triglyceride levels. The total cholesterol level should be lower than 200 mg/dL and is best at about 150 mg/dL.    LDL cholesterol  is called bad cholesterol  because it forms plaque in your arteries. As plaque builds up, your arteries become narrow, and less blood flows through. When plaque decreases blood flow to your heart, you may have chest pain. If plaque completely blocks an artery that brings blood to your heart, you may have a heart attack. Plaque can break off and form blood clots. Blood clots may block arteries in your brain and cause a stroke. The level should be less than 130 mg/dL and is best at about 100 mg/dL.         HDL cholesterol  is called good cholesterol  because it helps remove LDL cholesterol from your arteries. It does  this by attaching to LDL cholesterol and carrying it to your liver. Your liver breaks down LDL cholesterol so your body can get rid of it. High levels of HDL cholesterol can help prevent a heart attack and stroke. Low levels of HDL cholesterol can increase your risk for heart disease, heart attack, and stroke. The level should be at least 40 mg/dL in males or at least 50 mg/dL in females.    Triglycerides  are a type of fat that store energy from foods you eat. High levels of triglycerides also cause plaque buildup. This can increase your risk for a heart attack or stroke. If your triglyceride level is high, your LDL cholesterol level may also be high. The level should be less than 150 mg/dL.    Any of the following can increase your risk for high cholesterol:   Smoking or drinking large amounts of alcohol    Having overweight or obesity, or not getting enough exercise    A medical condition such as hypertension (high blood pressure) or diabetes    A family history of high cholesterol    Age older than 65    What you need to know about having your cholesterol levels checked:  Adults 20 to 45 years of age should have their cholesterol levels checked every 4 to 6 years. Adults 45 years or older should have their cholesterol checked every 1 to 2 years. You may need your cholesterol checked more often, or at a younger age, if you have risk factors for heart disease. You may also need to have your cholesterol checked more often if you have other health conditions, such as diabetes. Blood tests are used to check cholesterol levels. Blood tests measure your levels of triglycerides, LDL cholesterol, and HDL cholesterol.  How healthy fats affect your cholesterol levels:  Healthy fats, also called unsaturated fats, help lower LDL cholesterol and triglyceride levels. Healthy fats include the following:  Monounsaturated fats  are found in foods such as olive oil, canola oil, avocado, nuts, and olives.    Polyunsaturated fats,   such as omega 3 fats, are found in fish, such as salmon, trout, and tuna. They can also be found in plant foods such as flaxseed, walnuts, and soybeans.    How unhealthy fats affect your cholesterol levels:  Unhealthy fats increase LDL cholesterol and triglyceride levels. They are found in foods high in cholesterol, saturated fat, and trans fat:  Cholesterol  is found in eggs, dairy, and meat.    Saturated fat  is found in butter, cheese, ice cream, whole milk, and coconut oil. Saturated fat is also found in meat, such as sausage, hot dogs, and bologna.    Trans fat  is found in liquid oils and is used in fried and baked foods. Foods that contain trans fats include chips, crackers, muffins, sweet rolls, microwave popcorn, and cookies.    Treatment  for high cholesterol will also decrease your risk of heart disease, heart attack, and stroke. Treatment may include any of the following:  Lifestyle changes  may include food, exercise, weight loss, and quitting smoking. You may also need to decrease the amount of alcohol you drink. Your healthcare provider will want you to start with lifestyle changes. Other treatment may be added if lifestyle changes are not enough. Your healthcare provider may recommend you work with a team to manage hyperlipidemia. The team may include medical experts such as a dietitian, an exercise or physical therapist, and a behavior therapist. Your family members may be included in helping you create lifestyle changes.    Medicines  may be given to lower your LDL cholesterol, triglyceride levels, or total cholesterol level. You may need medicines to lower your cholesterol if any of the following is true:    You have a history of stroke, TIA, unstable angina, or a heart attack.    Your LDL cholesterol level is 190 mg/dL or higher.    You are age 40 to 75 years, have diabetes or heart disease risk factors, and your LDL cholesterol is 70 mg/dL or higher.    Supplements  include fish oil, red yeast  rice, and garlic. Fish oil may help lower your triglyceride and LDL cholesterol levels. It may also increase your HDL cholesterol level. Red yeast rice may help decrease your total cholesterol level and LDL cholesterol level. Garlic may help lower your total cholesterol level. Do not take any supplements without talking to your healthcare provider.    Food changes you can make to lower your cholesterol levels:  A dietitian can help you create a healthy eating plan. Your dietitian can show you how to read food labels and choose foods low in saturated fat, trans fats, and cholesterol.     Decrease the total amount of fat you eat.  Choose lean meats, fat-free or 1% fat milk, and low-fat dairy products, such as yogurt and cheese. Try to limit or avoid red meats. Limit or do not eat fried foods or baked goods, such as cookies.    Replace unhealthy fats with healthy fats.  Cook foods in olive oil or canola oil. Choose soft margarines that are low in saturated fat and trans fat. Seeds, nuts, and avocados are other examples of healthy fats.    Eat foods with omega-3 fats.  Examples include salmon, tuna, mackerel, walnuts, and flaxseed. Eat fish 2 times per week. Pregnant women should not eat fish that have high levels of mercury, such as shark, swordfish, and daryn mackerel.         Increase the amount of high-fiber foods you eat.  High-fiber foods can help lower your LDL cholesterol. Aim to get between 20 and 30 grams of fiber each day. Fruits and vegetables are high in fiber. Eat at least 5 servings each day. Other high-fiber foods are whole-grain or whole-wheat breads, pastas, or cereals, and brown rice. Eat 3 ounces of whole-grain foods each day. Increase fiber slowly. You may have abdominal discomfort, bloating, and gas if you add fiber to your diet too quickly.         Eat healthy protein foods.  Examples include low-fat dairy products, skinless chicken and turkey, fish, and nuts.    Limit foods and drinks that are  high in sugar.  Your dietitian or healthcare provider can help you create daily limits for high-sugar foods and drinks. The limit may be lower if you have diabetes or another health condition. Limits can also help you lose weight if needed.  Lifestyle changes you can make to lower your cholesterol levels:   Maintain a healthy weight.  Ask your healthcare provider what a healthy weight is for you. Ask your provider to help you create a weight loss plan if needed. Weight loss can decrease your total cholesterol and triglyceride levels. Weight loss may also help keep your blood pressure at a healthy level.    Be physically active throughout the day.  Physical activity, such as exercise, can help lower your total cholesterol level and maintain a healthy weight. Physical activity can also help increase your HDL cholesterol level. Work with your healthcare provider to create an program that is right for you. Get at least 30 to 40 minutes of moderate physical activity most days of the week. Examples of exercise include brisk walking, swimming, or biking. Also include strength training at least 2 times each week. Your healthcare providers can help you create a physical activity plan.            Do not smoke.  Nicotine and other chemicals in cigarettes and cigars can raise your cholesterol levels. Ask your healthcare provider for information if you currently smoke and need help to quit. E-cigarettes or smokeless tobacco still contain nicotine. Talk to your healthcare provider before you use these products.         Limit or do not drink alcohol.  Alcohol can increase your triglyceride levels. Ask your healthcare provider before you drink alcohol. Ask how much is okay for you to drink in 24 hours or 1 week.    Follow up with your doctor as directed:  Write down your questions so you remember to ask them during your visits.  © Copyright [employer] 2023 Information is for End User's use only and may not be sold, redistributed or  otherwise used for commercial purposes.  The above information is an  only. It is not intended as medical advice for individual conditions or treatments. Talk to your doctor, nurse or pharmacist before following any medical regimen to see if it is safe and effective for you.

## 2024-02-21 NOTE — PROGRESS NOTES
Assessment/Plan:  Problem List Items Addressed This Visit          Cardiovascular and Mediastinum    Hypertension     Stable.  Check blood pressure outside of office.  Recommend lifestyle modifications.           Relevant Orders    CT coronary calcium score    Comprehensive metabolic panel       Nervous and Auditory    Bertolotti's syndrome     Uncontrolled.  Management per Pain Management.  Patient seeking out of network Pain Management Consult at [facility].              Musculoskeletal and Integument    Lumbar spondylosis     Uncontrolled.  Management per Pain Management.  Patient seeking out of network Pain Management Consult at [facility].           Myofascial pain syndrome     Uncontrolled.  Management per Pain Management.  Patient seeking out of network Pain Management Consult at [facility].              Other    Anxiety     Stable.  Continue Prozac 40mg 1 pill daily - patient declines dose increase.  Continue counseling.           Relevant Medications    FLUoxetine (PROzac) 40 MG capsule    Other Relevant Orders    TSH, 3rd generation with Free T4 reflex    Depression     Stable.  Continue Prozac 40mg 1 pill daily - patient declines dose increase.  Continue counseling.           Relevant Medications    FLUoxetine (PROzac) 40 MG capsule    Other Relevant Orders    TSH, 3rd generation with Free T4 reflex    Hyperlipidemia     Stable s statin.  Recommend lifestyle modifications.     The 10-year ASCVD risk score (Dee KING, et al., 2019) is: 3.2%    Values used to calculate the score:      Age: 48 years      Sex: Male      Is Non- : No      Diabetic: No      Tobacco smoker: No      Systolic Blood Pressure: 108 mmHg      Is BP treated: Yes      HDL Cholesterol: 37 mg/dL      Total Cholesterol: 179 mg/dL           Relevant Orders    CT coronary calcium score    Comprehensive metabolic panel    Lipid panel    TSH, 3rd generation with Free T4  reflex    LDL cholesterol, direct    Overweight     Worsening.  Recommend lifestyle modifications.         Relevant Orders    CT coronary calcium score    ED (erectile dysfunction)     Management per Uro.  Continue Cialis 2.5mg QD.  Patient unable to afford Clomid initiation per Uro, which may help increase Testosterone levels.           Vitamin D deficiency     Stable on MVI and OTC Vitamin D 5,000IU daily.           Relevant Orders    Vitamin D 25 hydroxy    Coccydynia     Uncontrolled.  Management per Pain Management.  Patient seeking out of network Pain Management Consult at [facility].           Low testosterone     Management per Uro.  Resolved.  Unable to afford Clomid initiation per Uro, which may help increase Testosterone levels.          Benign prostatic hyperplasia with urinary frequency     Management per Uro.  On Flomax.            Other Visit Diagnoses       Annual physical exam    -  Primary    Health Maintenance   Topic Date Due   • PT PLAN OF CARE  06/07/2023   • COVID-19 Vaccine (3 - 2023-24 season) 05/21/2024 (Originally 9/1/2023)   • Zoster Vaccine (1 of 2) 08/04/2024   • Depression Screening  10/24/2024   • Annual Physical  02/21/2025   • Colorectal Cancer Screening  11/12/2030   • DTaP,Tdap,and Td Vaccines (2 - Td or Tdap) 12/01/2030   • HIV Screening  Completed   • Hepatitis C Screening  Completed   • Influenza Vaccine  Completed   • Pneumococcal Vaccine: Pediatrics (0 to 5 Years) and At-Risk Patients (6 to 64 Years)  Aged Out   • HIB Vaccine  Aged Out   • IPV Vaccine  Aged Out   • Hepatitis A Vaccine  Aged Out   • Meningococcal ACWY Vaccine  Aged Out   • HPV Vaccine  Aged Out         BMI 26.0-26.9,adult        Skin cancer screening        Relevant Orders    Ambulatory Referral to Dermatology               Return in about 4 months (around 6/21/2024) for 4mo - HTN, HL, Anx/Dep, LBP, ED, Labs; 4/9/24 - Pre-Op L Bunionectomy 5/8/24.      Future Appointments   Date Time Provider  Department Center   4/17/2024 11:00 AM Lizzie Navarrete DO [facility]   5/13/2024 10:45 AM [facility]   5/21/2024 10:15 AM [facility]        Subjective:     Rojelio is a 48 y.o. male who presents today for a follow-up on his chronic medical conditions.        HPI:  Chief Complaint   Patient presents with   • Physical Exam     Physical / 4mo - HTN, HL, Anx/Dep, LBP, ED, Labs. No new problems or concerns. Needs to schedule a pre-op visit for surgery in May.    • HM     No additional covid boosters.      -- Above per clinical staff and reviewed. --      HPI      Today:      Return in about 4 months (around 2/24/2024) for Physical / 4mo - HTN, HL, Anx/Dep, LBP, ED, Labs.     4mo OV    B/L Knee Pain - Management per Ortho Dr. Royal - next appt PRN.  Attending PT.  Symptoms x intermittently x 1 year.  Under the knee cap, intermittent pain, worse c traveling on stairs.  No pain c walking or running.  Denies locking, popping, or giving way.  Denies trauma.      Left Medial Foot Pain - Management per Podiatry Dr. Crowell - next appt 5/24.  Pending Left Bunionectomy 5/8/24.  Symptoms x couple months.  Concerned about bunion.  Got new fitted running shoes s benefit.  Has pain at left bunion c running.     Left Achilles Pain -  Management per Podiatry Dr. Crowell - next appt 11/23.  Symptoms x 4  Occurs c running.  Using ice and rest c benefit.      Overweight - Management per Exercise Nutritionist Lilyl Mcdaniel - next appt 12/23 - Not watching diet.  He is drinking soda and fast food.  Not counting calories with lose it sheri.  Drinking 2 small sodas daily.  Exercise - Running for 1 hour, 2-3 times per week, Jujitsu for 1 hour, 2-3 times per week,  Not Weightlifting for 1 hour, 2 times per week.       HTN - On Losartan 100mg QD.  No other HTN meds previously.  No BP check outside of office on arm cuff.     Hyperlipidemia - No statin previously.    "  H/O Grave's Disease / Hyperthyroidism - s/p Tapazole x 2 years, then radioactive iodine, which resulted in euthyroid status.       Anxiety / Depression - On increased Prozac 40mg QD.  Feels mood is improved 80% and stable.  Feels like his mood is more level.  D/C increased Effexor XR 225mg QD due to nightmares and inefficacy.  D/C Wellbutrin XL 300mg QD in AM and Cymbalta 90mg QD.  Mood is unchanged and he feels flat.  Mood is \"not great' - since summer 2022 - increased stress at work and in marriage.  He feels \"life is heavy,\" mind races, not sleeping well, is unmotivated.  He feels Wellbutrin is not working, but cannot specifically state why.  Previous Management per Psych at [facility] - last seen   - F/U PRN as was D/C to PCP Management.  On Wellbutrin XL 300mg QD in AM since .  Previously on Zoloft, Prozac - did fine with these meds, Lexapro 20mg QD ineffective.  No other mood meds or higher dose previously.  Sees counseling q3 weeks  - (no longer attending marriage counseling with wife), and individual - helpful.  Next appt .  Good social supports.  No SI/HI/AH/VH.          PHQ-2/9 Depression Screening    Little interest or pleasure in doing things: 1 - several days  Feeling down, depressed, or hopeless: 1 - several days  Trouble falling or staying asleep, or sleeping too much: 1 - several days  Feeling tired or having little energy: 1 - several days  Poor appetite or overeatin - not at all  Feeling bad about yourself - or that you are a failure or have let yourself or your family down: 1 - several days  Trouble concentrating on things, such as reading the newspaper or watching television: 0 - not at all  Moving or speaking so slowly that other people could have noticed. Or the opposite - being so fidgety or restless that you have been moving around a lot more than usual: 0 - not at all  Thoughts that you would be better off dead, or of hurting yourself in some way: 0 - not at all  PHQ-9 " Score: 5  PHQ-9 Interpretation: Mild depression       CORA-7 Flowsheet Screening    Flowsheet Row Most Recent Value   Over the last 2 weeks, how often have you been bothered by any of the following problems?    Feeling nervous, anxious, or on edge 0   Not being able to stop or control worrying 0   Worrying too much about different things 0   Trouble relaxing 1   Being so restless that it is hard to sit still 0   Becoming easily annoyed or irritable 1   Feeling afraid as if something awful might happen 0   CORA-7 Total Score 2           Chronic Lower Back Pain / Lumbar Spondylosis / Myofascial Pain Syndrome of Lumbar Spine / Bertolotti's Syndrome / Coccydynia  - Management per pain Management Dr. Ch.  Next appt?  Patient was advised to F/U [place] [facility], but  Is no longer accepting new patients.  s/p Flouro Guided TPI at the [place] 1/10/24 s benefit, s/p B/L Piriformis infection 10//23 s benefit.  S/p B/L SI joint injection 9/6/23 - worse symptoms today.  s/p 6/5/23 for ALEC s benefit.  Management per Ortho Spine Dr. Humphrey - next appt PRN.  Has daily spasms on medial sacrum, radiates into B/L buttocks.  Injured his back in 2015 during  training in [place].  He is using Flexeril 10mg 1/2 -1 daily PRN - needing to use 1/2 pill daily as spasms are worsening, taking 2-3 times per week when not working.   Previously seen [facility] 2021.  S/p ALEC s benefit.  Last MRI in [place] in [id number].  Surgery was not advised per advised.  Last PT in [place] 2018 s benefit.  He is attending PT 2 days per week and doing HEP, but causes pain.         Vitamin D Deficiency - No Drisdol.  Taking MVI and OTC Vitamin D 5,000IU daily.     ED - Management per Uro Dr. Armenta.  Next appt 7/24.  On Cialis 2.5mg QD - helpful.  Patient feels due to Lexapro and Wellbutrin.  D/C Viagra 50mg QD PRN - helpful, but desired daily Rx.  No other ED meds or higher doses previously.      H/O Low Testosterone - Management per Uro  Dr. Armenta.  Next appt 7/24.  Patient cannot afford Clomid.    BPH - Management per Uro Dr. Armenta.  Next appt 7/24.  On Flomax 0.4mg QD.    H/O False Positive HIV test - s/p [facility] e-consult per Dr. Mayen 10/7/22, who advised ID F/U vs. PCP repeat the testing at three months, six months and one year (completed 10/12/23), and include HIV PCR testing with the routine screening if it does not already reflex that way.      Normal colonoscopy 11/12/20 by GI Dr. Michelle @ [facility].  Repeat in 10 years.          Reviewed:  Labs 2/16/24, Pain Management 10/5/23, Nutrition 5/3/23, Uro 51/16/24, Ortho 9/11/23, Podiatry 2/12/24    Sees Dentist q6 months.  Sees Optho q2 years.              The following portions of the patient's history were reviewed and updated as appropriate: allergies, current medications, past family history, past medical history, past social history, past surgical history and problem list.      Review of Systems   Constitutional:  Positive for fatigue (Intermittent). Negative for appetite change, chills, diaphoresis and fever.   Respiratory:  Negative for chest tightness and shortness of breath.    Cardiovascular:  Negative for chest pain.   Gastrointestinal:  Positive for blood in stool (Occurred on toilet paper with constipation). Negative for abdominal pain, diarrhea, nausea and vomiting.   Genitourinary:  Negative for dysuria.   Musculoskeletal:  Positive for arthralgias and back pain.        Current Outpatient Medications   Medication Sig Dispense Refill   • Boswellia-Glucosamine-Vit D (Osteo Bi-Flex One Per Day) TABS Take 2 tablets by mouth in the morning     • Cholecalciferol (Vitamin D3) 125 MCG (5000 UT) TABS Take 5,000 Units by mouth daily     • [employer] FIBER GUMMIES PO Take 2 tablets by mouth in the morning     • cyclobenzaprine (FLEXERIL) 10 mg tablet Take 1 tablet (10 mg total) by mouth daily as needed for muscle spasms 30 tablet 0   • FLUoxetine (PROzac) 40 MG capsule Take 1 capsule (40 mg  "total) by mouth daily 90 capsule 2   • losartan (COZAAR) 100 MG tablet Take 1 tablet (100 mg total) by mouth daily 90 tablet 2   • methocarbamol (ROBAXIN) 500 mg tablet Take 1 tablet (500 mg total) by mouth 4 (four) times a day 60 tablet 2   • Multiple Vitamin (multivitamin) tablet Take 1 tablet by mouth daily     • tadalafil (CIALIS) 2.5 MG tablet Take 1 tablet (2.5 mg total) by mouth daily 90 tablet 2   • tamsulosin (FLOMAX) 0.4 mg Take 1 capsule (0.4 mg total) by mouth daily with dinner 90 capsule 3     No current facility-administered medications for this visit.       Objective:  /66   Pulse 75   Temp 97.7 °F (36.5 °C)   Resp 16   Ht 5' 10\" (1.778 m)   Wt 84.3 kg (185 lb 12.8 oz)   SpO2 98%   BMI 26.66 kg/m²    Wt Readings from Last 3 Encounters:   02/21/24 84.3 kg (185 lb 12.8 oz)   02/12/24 81.6 kg (180 lb)   01/16/24 84.8 kg (187 lb)      BP Readings from Last 3 Encounters:   02/21/24 108/66   02/12/24 122/82   01/16/24 124/80          Physical Exam  Vitals and nursing note reviewed.   Constitutional:       Appearance: Normal appearance. He is well-developed.   HENT:      Head: Normocephalic and atraumatic.      Right Ear: Tympanic membrane, ear canal and external ear normal.      Left Ear: Tympanic membrane, ear canal and external ear normal.      Nose: Nose normal.      Right Sinus: No maxillary sinus tenderness or frontal sinus tenderness.      Left Sinus: No maxillary sinus tenderness or frontal sinus tenderness.      Mouth/Throat:      Mouth: Mucous membranes are moist.      Pharynx: Oropharynx is clear. Uvula midline.      Tonsils: No tonsillar exudate.   Eyes:      Extraocular Movements: Extraocular movements intact.      Conjunctiva/sclera: Conjunctivae normal.      Pupils: Pupils are equal, round, and reactive to light.   Cardiovascular:      Rate and Rhythm: Normal rate and regular rhythm.      Pulses: Normal pulses.      Heart sounds: Normal heart sounds.   Pulmonary:      Effort: " "Pulmonary effort is normal.      Breath sounds: Normal breath sounds.   Abdominal:      General: Bowel sounds are normal. There is no distension.      Palpations: Abdomen is soft. There is no mass.      Tenderness: There is no abdominal tenderness. There is no guarding or rebound.   Musculoskeletal:         General: No swelling or tenderness.      Cervical back: Neck supple.      Right lower leg: No edema.      Left lower leg: No edema.   Lymphadenopathy:      Cervical: No cervical adenopathy.   Skin:     Findings: No rash.   Neurological:      General: No focal deficit present.      Mental Status: He is alert and oriented to person, place, and time.   Psychiatric:         Mood and Affect: Mood normal.         Behavior: Behavior normal.         Thought Content: Thought content normal.         Judgment: Judgment normal.         Lab Results:      Lab Results   Component Value Date    WBC 5.94 02/16/2024    HGB 15.7 02/16/2024    HCT 47.5 02/16/2024     02/16/2024    TRIG 215 (H) 02/16/2024    HDL 37 (L) 02/16/2024    LDLDIRECT 121 (H) 02/16/2024    ALT 14 02/16/2024    AST 14 02/16/2024    K 4.1 02/16/2024     02/16/2024    CREATININE 1.02 02/16/2024    BUN 20 02/16/2024    CO2 30 02/16/2024    TSH 3.38 10/06/2020    PSA 0.64 05/26/2023    GLUF 90 02/16/2024    HGBA1C 5.4 02/16/2024     No results found for: \"URICACID\"  Invalid input(s): \"BASENAME\" Vitamin D    No results found.     POCT Labs      BMI Counseling: Body mass index is 26.66 kg/m². The BMI is above normal. Exercise recommendations include exercising 3-5 times per week.     Depression Screening and Follow-up Plan: Patient's depression screening was positive with a PHQ-9 score of 5. Patient advised to follow-up with PCP for further management.                     BMI Counseling: Body mass index is 26.66 kg/m². The BMI is above normal. Nutrition recommendations include 3-5 servings of fruits/vegetables daily. Exercise recommendations include " exercising 3-5 times per week.

## 2024-02-21 NOTE — ASSESSMENT & PLAN NOTE
Uncontrolled.  Management per Pain Management.  Patient seeking out of network Pain Management Consult at [facility].

## 2024-02-21 NOTE — ASSESSMENT & PLAN NOTE
Management per Uro.  Resolved.  Unable to afford Clomid initiation per Uro, which may help increase Testosterone levels.

## 2024-02-21 NOTE — ASSESSMENT & PLAN NOTE
Management per Uro.  Continue Cialis 2.5mg QD.  Patient unable to afford Clomid initiation per Uro, which may help increase Testosterone levels.

## 2024-02-21 NOTE — ASSESSMENT & PLAN NOTE
Uncontrolled.  Management per Pain Management.  Patient seeking out of network Pain Management Consult at [facility].

## 2024-02-21 NOTE — ASSESSMENT & PLAN NOTE
Uncontrolled.  Management per Pain Management.  Patient seeking out of network Pain Management Consult at [facility].

## 2024-02-21 NOTE — ASSESSMENT & PLAN NOTE
Stable.  Continue Prozac 40mg 1 pill daily - patient declines dose increase.  Continue counseling.

## 2024-02-21 NOTE — ASSESSMENT & PLAN NOTE
Uncontrolled.  Management per Pain Management.  Patient seeking out of network Pain Management Consult at [facility].

## 2024-02-21 NOTE — ASSESSMENT & PLAN NOTE
Stable s statin.  Recommend lifestyle modifications.     The 10-year ASCVD risk score (Dee KING, et al., 2019) is: 3.2%    Values used to calculate the score:      Age: 48 years      Sex: Male      Is Non- : No      Diabetic: No      Tobacco smoker: No      Systolic Blood Pressure: 108 mmHg      Is BP treated: Yes      HDL Cholesterol: 37 mg/dL      Total Cholesterol: 179 mg/dL

## 2024-03-12 ENCOUNTER — NURSE TRIAGE (OUTPATIENT)
Age: 49
End: 2024-03-12

## 2024-03-12 NOTE — TELEPHONE ENCOUNTER
Regarding: dizziness and hit head  ----- Message from Terri Lawrence sent at 3/12/2024  9:48 AM EDT -----  [employer] MESSAGE.  Please call patient and triage. Thank you    Dr Suki Navarrete,  I need to tell you lately I have been having dizzy spells when I seem to stand up too quickly.  I've fallen down several times or felt like I was going to . Last night getting up to the bathroom , I got dizzy and hit my head . I was wondering if this could be related to my blood pressure medication.  Do you think I still need to be on it ? My readings have been good . I was just wondering if that was causing my issues   This encounter is not si

## 2024-03-18 DIAGNOSIS — N52.8 OTHER MALE ERECTILE DYSFUNCTION: ICD-10-CM

## 2024-03-18 DIAGNOSIS — M79.18 MYOFASCIAL PAIN SYNDROME OF LUMBAR SPINE: ICD-10-CM

## 2024-03-18 DIAGNOSIS — Q76.49 BERTOLOTTI'S SYNDROME: ICD-10-CM

## 2024-03-18 DIAGNOSIS — M47.816 LUMBAR SPONDYLOSIS: ICD-10-CM

## 2024-03-18 NOTE — TELEPHONE ENCOUNTER
Pt request refill on Tadalafil 2.5 mg tablet if [facility] [place]

## 2024-03-19 RX ORDER — TADALAFIL 2.5 MG/1
2.5 TABLET ORAL DAILY
Qty: 90 TABLET | Refills: 1 | Status: SHIPPED | OUTPATIENT
Start: 2024-03-19

## 2024-03-19 RX ORDER — CYCLOBENZAPRINE HCL 10 MG
10 TABLET ORAL DAILY PRN
Qty: 30 TABLET | Refills: 0 | OUTPATIENT
Start: 2024-03-19

## 2024-03-19 RX ORDER — CYCLOBENZAPRINE HCL 10 MG
10 TABLET ORAL DAILY PRN
Qty: 30 TABLET | Refills: 0 | Status: SHIPPED | OUTPATIENT
Start: 2024-03-19 | End: 2024-03-19

## 2024-03-19 NOTE — TELEPHONE ENCOUNTER
Please review flexeril, cannot be delegated, was sent to pharm and canceled (spoke dm [facility] pharmacist at pharmacy [facility]) due to medication cannot be delegated and must be reviewed by the provider.    Please review and reorder accordingly.

## 2024-03-19 NOTE — TELEPHONE ENCOUNTER
Patient is prescribed a different muscle relaxer, Robaxin, per Pain Management.  If he is requesting Flexeril instead, he needs to request Rx from and discuss further with Pain Management.

## 2024-03-20 ENCOUNTER — TELEPHONE (OUTPATIENT)
Dept: OBGYN CLINIC | Facility: HOSPITAL | Age: 49
End: 2024-03-20

## 2024-03-20 NOTE — TELEPHONE ENCOUNTER
Caller: Patient    Doctor: Socrates    Reason for call: Pt would like to cxl sx.  Does not wish to reschedule at this time.    Call back#: [phone]

## 2024-03-21 ENCOUNTER — TELEPHONE (OUTPATIENT)
Dept: OBGYN CLINIC | Facility: CLINIC | Age: 49
End: 2024-03-21

## 2024-03-21 NOTE — TELEPHONE ENCOUNTER
Called Rojelio AGARWAL that I cancelled his surgery per his request and left my direct line if he would like to reschedule or has any further questions.

## 2024-03-22 ENCOUNTER — TELEPHONE (OUTPATIENT)
Dept: PODIATRY | Facility: CLINIC | Age: 49
End: 2024-03-22

## 2024-04-12 ENCOUNTER — OFFICE VISIT (OUTPATIENT)
Age: 49
End: 2024-04-12
Payer: COMMERCIAL

## 2024-04-12 VITALS
WEIGHT: 185 LBS | DIASTOLIC BLOOD PRESSURE: 74 MMHG | OXYGEN SATURATION: 98 % | HEART RATE: 75 BPM | BODY MASS INDEX: 26.48 KG/M2 | SYSTOLIC BLOOD PRESSURE: 120 MMHG | HEIGHT: 70 IN

## 2024-04-12 DIAGNOSIS — M53.3 COCCYDYNIA: ICD-10-CM

## 2024-04-12 DIAGNOSIS — M99.03 SEGMENTAL DYSFUNCTION OF LUMBAR REGION: Primary | ICD-10-CM

## 2024-04-12 DIAGNOSIS — M99.04 SEGMENTAL DYSFUNCTION OF SACRAL REGION: ICD-10-CM

## 2024-04-12 DIAGNOSIS — G57.03 PIRIFORMIS SYNDROME OF BOTH SIDES: ICD-10-CM

## 2024-04-12 DIAGNOSIS — M46.1 SACROILIITIS, NOT ELSEWHERE CLASSIFIED (HCC): ICD-10-CM

## 2024-04-12 DIAGNOSIS — M99.02 SEGMENTAL DYSFUNCTION OF THORACIC REGION: ICD-10-CM

## 2024-04-12 PROCEDURE — 97110 THERAPEUTIC EXERCISES: CPT | Performed by: CHIROPRACTOR

## 2024-04-12 PROCEDURE — 98941 CHIROPRACT MANJ 3-4 REGIONS: CPT | Performed by: CHIROPRACTOR

## 2024-04-12 PROCEDURE — 99203 OFFICE O/P NEW LOW 30 MIN: CPT | Performed by: CHIROPRACTOR

## 2024-04-12 NOTE — PROGRESS NOTES
Diagnoses and all orders for this visit:    Segmental dysfunction of lumbar region    Sacroiliitis, not elsewhere classified (HCC)  -     Ambulatory referral to Chiropractic    Coccydynia  -     Ambulatory referral to Chiropractic    Piriformis syndrome of both sides  -     Ambulatory referral to Chiropractic    Segmental dysfunction of sacral region    Segmental dysfunction of thoracic region       ASSESSMENT:  No red flags, radiculopathy or neurologic deficit appreciated clinically. Pt's symptoms and exam findings consistent with Coccydynia and Sacroiliitis, exacerbated by postural/ergonomic stressors. Pt responded well to flexion biased stretches and manual mobilization of the affected spinal and myofascial tissues with increased ROM; trial of conservative tx recommended consisting of stretching, graded mobilization/manipulation of the affected spinal and myofascial jt dysfunction, postural/ergonomic education and take home stretches/exercises. If symptoms fail to improve with short trial of conservative care, appropriate imaging and referral will be coordinated.  Spent greater than 30 min c pt discussing hx, pe, ddx, tx options and reviewing notes/imaging    PROCEDURE CODES: 48670, 49764, 01378    TREATMENT:  Fear avoidance behavior discussion; encouraged and reassured pt that natural course of condition is to improve over time with adherence to tx plan and home care strategies. Home care recommendations: avoid bed rest, walk (but avoid trails and uneven surfaces), gradual return to activity to tolerance (avoid anything that peripheralizes symptoms), call if symptoms peripheralize, worsen, or neurologic deficit progresses. Ther-ex: IASTM; discussed post procedure soreness and/or ecchymosis for up to 36 hrs, applied to affected mm hypertonicities; supine hamstring stretch, supine gluteal stretch, side laying QL stretch, single knee to chest stretch, hip flexor pin-and-stretch, alternating prone hip extension,  glute bridge, transitional mvmt education, abdominal bracing; greater than 15 min spent performing above mentioned ther-ex to improve ROM/flexibility. Thoracic mobilization/manipulation: prone P-A mob; Lumbar mobilization/manipulation: diversified side laying graded HVLA, flexion-traction; SIJ Manipulation/Mobilization: R/L SIJ HVLA - long axis distraction, diane drop table maneuver to affected SIJ    HPI:  Rojelio Lorenzo is a 48 y.o. male  Chief Complaint   Patient presents with   • Back Pain     Lower back pain-9     The patient presents to the office with sacral and tailbone pain that has been chronic in nature. Years ago he reports that while performing a training session as a - he was on hands and knees and doing drags and crawls on concrete floor and heard a pop, the patient reports he has had a lot of diagnoses and a lot of treatments including PT and injections into coccyx glute spasms. These have given him minimal improvements. Self care includes-always taking some kind of Tylenol or NSAID and Flexeril when pain is bad, heat that helped. He has some improvements with active release that he received out in [place] years ago. Most of the time constant pain but at times when the glutes spasm and then he has pain. Pt was referred here by Honey Flaherty PA-C. He reports in constant moderate pain with regular flare ups when pain gets severe. 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 No pain into LE.      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   • Depression    • ED (erectile dysfunction)    • History of Graves' disease     s/p Tapazole x 2 years, then radioactive iodine, which resulted in euthyroid status   • Hyperlipidemia    • Hypertension    • Low testosterone 05/22/2023   • Lumbar spondylosis 11/16/2020   • Myofascial pain syndrome of  lumbar spine 12/14/2020   • Overweight    • Vitamin D deficiency       Past Surgical History:   Procedure Laterality Date   • NO PAST SURGERIES       The following portions of the patient's history were reviewed and updated as appropriate: allergies, past family history, past medical history, past social history, past surgical history, and problem list.  Review of Systems   Constitutional:  Negative for activity change, fatigue, fever and unexpected weight change.   HENT:  Negative for ear pain, hearing loss, sinus pressure, sinus pain, sore throat and tinnitus.    Respiratory:  Negative for chest tightness, shortness of breath, wheezing and stridor.    Cardiovascular:  Negative for chest pain.   Genitourinary:  Negative for flank pain and frequency.   Musculoskeletal:  Positive for myalgias. Negative for back pain, joint swelling, neck pain and neck stiffness.   Skin:  Negative for color change and pallor.   Neurological:  Negative for dizziness, speech difficulty, weakness, numbness and headaches.   Psychiatric/Behavioral:  Negative for agitation and sleep disturbance. The patient is not nervous/anxious.      Physical Exam  Constitutional:       General: He is not in acute distress.     Appearance: Normal appearance.   HENT:      Head: Normocephalic.      Mouth/Throat:      Mouth: Mucous membranes are moist.   Eyes:      Extraocular Movements: Extraocular movements intact.      Conjunctiva/sclera: Conjunctivae normal.      Pupils: Pupils are equal, round, and reactive to light.   Neck:      Vascular: No carotid bruit.   Pulmonary:      Effort: Pulmonary effort is normal.   Chest:      Chest wall: No tenderness.   Abdominal:      General: Abdomen is flat.      Palpations: Abdomen is soft.   Musculoskeletal:         General: Tenderness present. No swelling, deformity or signs of injury.      Cervical back: Normal range of motion. No rigidity or tenderness.      Thoracic back: Spasms present. Decreased range of  motion.      Lumbar back: Spasms and tenderness present. No swelling, edema, deformity, signs of trauma or lacerations. Normal range of motion.        Back:       Right lower leg: No edema.      Left lower leg: No edema.   Lymphadenopathy:      Cervical: No cervical adenopathy.   Skin:     General: Skin is warm.      Coloration: Skin is not jaundiced or pale.      Findings: No bruising or erythema.   Neurological:      Mental Status: He is alert and oriented to person, place, and time.      Cranial Nerves: No cranial nerve deficit.      Sensory: No sensory deficit.      Motor: No weakness.      Gait: Gait is intact.      Deep Tendon Reflexes: Reflexes are normal and symmetric.   Psychiatric:         Attention and Perception: Attention normal.         Mood and Affect: Mood and affect normal.         Speech: Speech normal.         Behavior: Behavior normal. Behavior is cooperative.         Thought Content: Thought content normal.         Cognition and Memory: Cognition normal.         Judgment: Judgment normal.       SOFT TISSUE ASSESSMENT Hypertonicity and tenderness palpated B L4-S1 erector spinae, hip flexor, glute med/min, QL, hamstring JOINT RESTRICTIONS: T10-L1, L4-S1 and R/L SIJ ORTHO: SI jt point tenderness: +; Asmita unremarkable for centralization/peripheralization; ba's, iliac compression, thigh thrust elicit lbp in R/L SIJ; prone femoral nerve stretch neg for upper lumbar neural tension, elicits R/L SIJ stiffness; sitting root elicits no lbp on R/L; slump test elicits no neural tension R/L    Return in about 1 week (around 4/19/2024) for Recheck.

## 2024-04-18 ENCOUNTER — PATIENT MESSAGE (OUTPATIENT)
Dept: FAMILY MEDICINE CLINIC | Facility: CLINIC | Age: 49
End: 2024-04-18

## 2024-04-19 ENCOUNTER — PATIENT MESSAGE (OUTPATIENT)
Dept: PAIN MEDICINE | Facility: MEDICAL CENTER | Age: 49
End: 2024-04-19

## 2024-04-19 DIAGNOSIS — M79.18 MYOFASCIAL PAIN SYNDROME: Primary | ICD-10-CM

## 2024-04-19 NOTE — TELEPHONE ENCOUNTER
I will fill it this time so that he does not have to go without his medication however future fills should go to PCP. It appears this patient is seeking out an opinion from [facility] regarding pain management and will not continue to follow up with us at this point.

## 2024-04-22 ENCOUNTER — PATIENT MESSAGE (OUTPATIENT)
Dept: FAMILY MEDICINE CLINIC | Facility: CLINIC | Age: 49
End: 2024-04-22

## 2024-04-22 RX ORDER — CYCLOBENZAPRINE HCL 10 MG
10 TABLET ORAL DAILY PRN
Qty: 30 TABLET | Refills: 0 | Status: SHIPPED | OUTPATIENT
Start: 2024-04-22

## 2024-04-22 NOTE — PATIENT COMMUNICATION
Please review and advise. Pt spoke with pain management who told him to discuss the refill with his pcp.

## 2024-04-22 NOTE — TELEPHONE ENCOUNTER
Flexeril sent to pharmacy on file. Please advise if he is not following up with us any longer he should reach out to PCP for future fills. I took the methocarbamol out of his chart so there should be no issue with that.     Thank you!

## 2024-05-06 ENCOUNTER — TELEPHONE (OUTPATIENT)
Dept: PAIN MEDICINE | Facility: CLINIC | Age: 49
End: 2024-05-06

## 2024-05-06 ENCOUNTER — PROCEDURE VISIT (OUTPATIENT)
Age: 49
End: 2024-05-06
Payer: COMMERCIAL

## 2024-05-06 VITALS
DIASTOLIC BLOOD PRESSURE: 78 MMHG | HEART RATE: 86 BPM | HEIGHT: 70 IN | BODY MASS INDEX: 26.48 KG/M2 | OXYGEN SATURATION: 99 % | WEIGHT: 185 LBS | SYSTOLIC BLOOD PRESSURE: 124 MMHG

## 2024-05-06 DIAGNOSIS — M46.1 SACROILIITIS, NOT ELSEWHERE CLASSIFIED (HCC): Primary | ICD-10-CM

## 2024-05-06 DIAGNOSIS — G57.03 PIRIFORMIS SYNDROME OF BOTH SIDES: ICD-10-CM

## 2024-05-06 DIAGNOSIS — M53.3 COCCYDYNIA: ICD-10-CM

## 2024-05-06 DIAGNOSIS — M99.03 SEGMENTAL DYSFUNCTION OF LUMBAR REGION: ICD-10-CM

## 2024-05-06 DIAGNOSIS — M99.02 SEGMENTAL DYSFUNCTION OF THORACIC REGION: ICD-10-CM

## 2024-05-06 DIAGNOSIS — M99.04 SEGMENTAL DYSFUNCTION OF SACRAL REGION: ICD-10-CM

## 2024-05-06 PROCEDURE — 99211 OFF/OP EST MAY X REQ PHY/QHP: CPT | Performed by: CHIROPRACTOR

## 2024-05-06 PROCEDURE — 97110 THERAPEUTIC EXERCISES: CPT | Performed by: CHIROPRACTOR

## 2024-05-06 PROCEDURE — 98941 CHIROPRACT MANJ 3-4 REGIONS: CPT | Performed by: CHIROPRACTOR

## 2024-05-06 NOTE — PROGRESS NOTES
Diagnoses and all orders for this visit:    Sacroiliitis, not elsewhere classified (HCC)    Coccydynia    Piriformis syndrome of both sides    Segmental dysfunction of lumbar region    Segmental dysfunction of thoracic region    Segmental dysfunction of sacral region       ASSESSMENT:  Pt's symptoms and exam findings consistent with Coccydynia and Sacroiliitis, exacerbated by postural/ergonomic stressors. Pt responded well to flexion biased stretches and manual mobilization of the affected spinal and myofascial tissues with increased ROM; trial of conservative tx recommended consisting of stretching, graded mobilization/manipulation of the affected spinal and myofascial jt dysfunction, postural/ergonomic education and take home stretches/exercises. If symptoms fail to improve with short trial of conservative care, appropriate imaging and referral will be coordinated.  5/7- Re-eval 5 mins in duration-Severe pain-- Neural tension present. tolerated light treatment fairly well with ice. The patient has appt with pain management tomorrow morning and we will co-manage according to their treatment plan. Pt will remain out of work for one week and was given doctors note.    PROCEDURE CODES: 18467, 40818    TREATMENT:  Fear avoidance behavior discussion; encouraged and reassured pt that natural course of condition is to improve over time with adherence to tx plan and home care strategies. Home care recommendations: avoid bed rest, walk (but avoid trails and uneven surfaces), gradual return to activity to tolerance (avoid anything that peripheralizes symptoms), call if symptoms peripheralize, worsen, or neurologic deficit progresses. Ther-ex: IASTM; discussed post procedure soreness and/or ecchymosis for up to 36 hrs, applied to affected mm hypertonicities; supine hamstring stretch, supine gluteal stretch, side laying QL stretch, single knee to chest stretch, hip flexor pin-and-stretch, alternating prone hip extension, glute  bridge, transitional mvmt education, abdominal bracing; greater than 15 min spent performing above mentioned ther-ex to improve ROM/flexibility. Thoracic mobilization/manipulation: prone P-A mob; Lumbar mobilization/manipulation: diversified side laying graded HVLA, flexion-traction; SIJ Manipulation/Mobilization: R/L SIJ HVLA - long axis distraction, diane drop table maneuver to affected SIJ    HPI:  Rojelio Lorenzo is a 48 y.o. male  Chief Complaint   Patient presents with   • Back Pain     Lower back pain-10     The patient presents to the office with sacral and tailbone pain that has been chronic in nature. Years ago he reports that while performing a training session as a - he was on hands and knees and doing drags and crawls on concrete floor and heard a pop, the patient reports he has had a lot of diagnoses and a lot of treatments including PT and injections into coccyx glute spasms. These have given him minimal improvements. Self care includes-always taking some kind of Tylenol or NSAID and Flexeril when pain is bad, heat that helped. He has some improvements with active release that he received out in [place] years ago. Most of the time constant pain but at times when the glutes spasm and then he has pain. Pt was referred here by Honey Flaherty PA-C. He reports in constant moderate pain with regular flare ups when pain gets severe. 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 No pain into LE.  5/6- The patient is feeling worse over this weekend, the patient reports it started Saturday evening, shooting pains. Has appt with spine and pain tomorrow but pain is unrelenting.      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   • Depression    • ED (erectile dysfunction)    • History of Graves' disease     s/p Tapazole x 2 years, then radioactive  iodine, which resulted in euthyroid status   • Hyperlipidemia    • Hypertension    • Low testosterone 05/22/2023   • Lumbar spondylosis 11/16/2020   • Myofascial pain syndrome of lumbar spine 12/14/2020   • Overweight    • Vitamin D deficiency       Past Surgical History:   Procedure Laterality Date   • NO PAST SURGERIES       The following portions of the patient's history were reviewed and updated as appropriate: allergies, past family history, past medical history, past social history, past surgical history, and problem list.  Review of Systems   Musculoskeletal:  Positive for back pain and myalgias.     Physical Exam  Musculoskeletal:         General: Tenderness present.      Thoracic back: Spasms present. Decreased range of motion.      Lumbar back: Spasms and tenderness present. No swelling, edema, deformity, signs of trauma or lacerations. Normal range of motion.        Back:    Neurological:      Gait: Gait is intact.      Deep Tendon Reflexes: Reflexes are normal and symmetric.   Psychiatric:         Attention and Perception: Attention normal.         Mood and Affect: Affect normal.         Speech: Speech normal.         Behavior: Behavior is cooperative.         Cognition and Memory: Cognition normal.       SOFT TISSUE ASSESSMENT Hypertonicity and tenderness palpated B L4-S1 erector spinae, hip flexor, glute med/min, QL, hamstring JOINT RESTRICTIONS: T10-L1, L4-S1 and R/L SIJ ORTHO: SI jt point tenderness: +; Asmita unremarkable for centralization/peripheralization; ba's, iliac compression, thigh thrust elicit lbp in R/L SIJ; prone femoral nerve stretch -was not performed bc of acute nature of pain and inability to lie down, elicits R/L SIJ stiffness; sitting root elicits  lbp on R/L; slump test elicits neural tension R/L    Return in about 1 week (around 5/13/2024) for Recheck.

## 2024-05-06 NOTE — TELEPHONE ENCOUNTER
Caller: Rojelio    Doctor: Dima    Reason for call: should he cancel chiro appt? Scheduled with uJlieta on 5/7 8 am     Also he is unable to get into his [facility] my chart he needs help getting in.     Call back#: [phone]

## 2024-05-06 NOTE — TELEPHONE ENCOUNTER
RN attempted to reach pt regarding previous. VMMLOM with c/b number office hours and location provided.  RN left a message that she would still go to the chiropractor and then come to see CG tomorrow.    Phone number to call if having trouble with MyChart is:[phone]

## 2024-05-06 NOTE — LETTER
May 6, 2024     Patient: Rojelio Lorenzo  YOB: 1975  Date of Visit: 5/6/2024      To Whom it May Concern:    Rojelio Lorenzo is under my professional care. Rojelio was seen in my office on 5/6/2024. The patient will remain out of work for the next week. Rojelio may return to work on 5/6/24 .    If you have any questions or concerns, please don't hesitate to call.         Sincerely,          Maria Esther Guerra DC        CC: No Recipients

## 2024-05-07 ENCOUNTER — OFFICE VISIT (OUTPATIENT)
Dept: PAIN MEDICINE | Facility: MEDICAL CENTER | Age: 49
End: 2024-05-07
Payer: COMMERCIAL

## 2024-05-07 ENCOUNTER — TELEPHONE (OUTPATIENT)
Dept: PAIN MEDICINE | Facility: MEDICAL CENTER | Age: 49
End: 2024-05-07

## 2024-05-07 VITALS
OXYGEN SATURATION: 98 % | DIASTOLIC BLOOD PRESSURE: 72 MMHG | BODY MASS INDEX: 26.48 KG/M2 | WEIGHT: 185 LBS | HEIGHT: 70 IN | SYSTOLIC BLOOD PRESSURE: 112 MMHG | HEART RATE: 81 BPM

## 2024-05-07 DIAGNOSIS — M79.18 MYOFASCIAL PAIN SYNDROME: ICD-10-CM

## 2024-05-07 DIAGNOSIS — M47.816 LUMBAR SPONDYLOSIS: ICD-10-CM

## 2024-05-07 DIAGNOSIS — M46.1 SACROILIITIS, NOT ELSEWHERE CLASSIFIED (HCC): ICD-10-CM

## 2024-05-07 DIAGNOSIS — M54.50 ACUTE RIGHT-SIDED LOW BACK PAIN WITHOUT SCIATICA: Primary | ICD-10-CM

## 2024-05-07 PROCEDURE — 99214 OFFICE O/P EST MOD 30 MIN: CPT

## 2024-05-07 RX ORDER — METHYLPREDNISOLONE 4 MG/1
TABLET ORAL
Qty: 1 EACH | Refills: 0 | Status: SHIPPED | OUTPATIENT
Start: 2024-05-07

## 2024-05-07 NOTE — TELEPHONE ENCOUNTER
Pt aware of same and will wait call from  to schedule same    --please call pt to schedule SIJ thank you

## 2024-05-07 NOTE — TELEPHONE ENCOUNTER
RN s/w pt regarding another task pt is going to take STEROID dose husam as well as be scheduled for an SIJ

## 2024-05-07 NOTE — PROGRESS NOTES
Assessment:  1. Acute right-sided low back pain without sciatica    2. Myofascial pain syndrome    3. Sacroiliitis, not elsewhere classified (HCC)    4. Lumbar spondylosis        Plan:  Schedule for right sacroiliac joint injection to address new right-sided low back pain.  Complete benefits and risks of this procedure including hyperglycemia, bleeding, infection, local tissue reaction, nerve injury and allergic reaction were discussed at today's visit. The approach was demonstrated using models and literature was provided for home review. The patient was agreeable, verbalized uderstanding, and wishes to proceed with scheduling the procedure.  Initiate Medrol Dosepak to alleviate acute severe pain.   The patient intends to be seen in consultation by [facility] regarding his complex sacral pain that is unresponsive to interventional procedures and oral medications.  Patient states he is waiting on approval from insurance to be seen by a provider out of network.  Continue cyclobenzaprine as prescribed.  Patient did not require refills of this medication today.  Follow-up after injection, or sooner if needed.    My impressions and treatment recommendations were discussed in detail with the patient who verbalized understanding and had no further questions.  Discharge instructions were provided. I personally saw and examined the patient and I agree with the above discussed plan of care.    No orders of the defined types were placed in this encounter.    New Medications Ordered This Visit   Medications    methylPREDNISolone 4 MG tablet therapy pack     Sig: Use as directed on package     Dispense:  1 each     Refill:  0       History of Present Illness:  Rojelio Lorenzo is a 48 y.o. male who presents for a follow up office visit in regards to new right-sided low back pain that is very severe at this time.  Patient reports that his pain is localized to the lumbosacral region on the right and does not radiate.  This is a  new pain for him.  His chronic pain complaint is typically localized to the sacral region and has been unresponsive to multiple interventional approaches and oral analgesic medications.  His new right-sided pain is constant, and he is currently rating it a 10/10 in intensity.  He describes the quality of his pain as sharp and shooting.  He continues to deny any radicular features.    Patient does continue to have pain in the sacral region and he was instructed to meet with a physician at [facility] regarding this.  Patient states he is in the process of obtaining insurance approval to be seen by an out of network clinician.    He is currently taking cyclobenzaprine 10 mg as needed for pain control.    I have personally reviewed and/or updated the patient's past medical history, past surgical history, family history, social history, current medications, allergies, and vital signs today.     Review of Systems   Respiratory:  Negative for shortness of breath.    Cardiovascular:  Negative for chest pain.   Gastrointestinal:  Negative for constipation, diarrhea, nausea and vomiting.   Musculoskeletal:  Positive for back pain and myalgias. Negative for arthralgias, gait problem and joint swelling.   Skin:  Negative for rash.   Neurological:  Negative for dizziness, seizures and weakness.   All other systems reviewed and are negative.      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    Depression     ED (erectile dysfunction)      History of Graves' disease     s/p Tapazole x 2 years, then radioactive iodine, which resulted in euthyroid status    Hyperlipidemia     Hypertension     Low testosterone 2023    Lumbar spondylosis 2020    Myofascial pain syndrome of lumbar spine 2020    Overweight     Vitamin D deficiency        Past Surgical History:   Procedure Laterality Date    NO PAST SURGERIES         Family History   Problem Relation Age of Onset    Hypertension Mother     Diabetes type II Mother     Stomach cancer Mother 53    Depression Mother     Hyperlipidemia Mother     Mental illness Mother     Cancer Mother     Diabetes Mother     Depression Father     Mental retardation Brother     No Known Problems Daughter        Social History     Occupational History    Occupation: Paramedic with [employer]   Tobacco Use    Smoking status: Former     Current packs/day: 0.00     Average packs/day: 1.5 packs/day for 19.0 years (28.5 ttl pk-yrs)     Types: Cigarettes     Start date: 1992     Quit date: 2011     Years since quittin.3     Passive exposure: Never    Smokeless tobacco: Never   Vaping Use    Vaping status: Never Used   Substance and Sexual Activity    Alcohol use: Yes     Alcohol/week: 6.0 standard drinks of alcohol     Types: 3 Glasses of wine, 3 Standard drinks or equivalent per week     Comment: occasionally, socially    Drug use: Not Currently     Types: Marijuana     Comment: Last use 20yo    Sexual activity: Yes     Partners: Female     Birth control/protection: None       Current Outpatient Medications on File Prior to Visit   Medication Sig    Boswellia-Glucosamine-Vit D (Osteo Bi-Flex One Per Day) TABS Take 2 tablets by mouth in the morning    Cholecalciferol (Vitamin D3) 125 MCG (5000 UT) TABS Take 5,000 Units by mouth daily    [employer] FIBER GUMMIES PO Take 2 tablets by mouth in the morning    cyclobenzaprine (FLEXERIL) 10 mg tablet Take 1 tablet (10 mg total) by mouth  "daily as needed for muscle spasms    FLUoxetine (PROzac) 40 MG capsule Take 1 capsule (40 mg total) by mouth daily    losartan (COZAAR) 100 MG tablet Take 1 tablet (100 mg total) by mouth daily    Multiple Vitamin (multivitamin) tablet Take 1 tablet by mouth daily    tadalafil (CIALIS) 2.5 MG tablet Take 1 tablet (2.5 mg total) by mouth daily    tamsulosin (FLOMAX) 0.4 mg Take 1 capsule (0.4 mg total) by mouth daily with dinner     No current facility-administered medications on file prior to visit.       No Known Allergies    Physical Exam:    /72   Pulse 81   Ht 5' 10\" (1.778 m)   Wt 83.9 kg (185 lb)   SpO2 98%   BMI 26.54 kg/m²     Constitutional:normal, well developed, well nourished, alert, in no distress and non-toxic and no overt pain behavior.  Eyes:anicteric  HEENT:grossly intact  Neck:supple, symmetric, trachea midline and no masses   Pulmonary:even and unlabored  Cardiovascular:No edema or pitting edema present  Skin:Normal without rashes or lesions and well hydrated  Psychiatric:Mood and affect appropriate  Neurologic:Cranial Nerves II-XII grossly intact  Musculoskeletal:normal, except for tenderness to palpation over the right sacroiliac joint    Special Tests:  Right Dwaine's Maneuver:  positive  Right Gaenslen's Test:  positive  Right Pelvic Distraction Test:  positive  + Leroy finger sign on the right       "

## 2024-05-07 NOTE — TELEPHONE ENCOUNTER
----- Message from Elizabeth Barry PA-C sent at 5/7/2024  9:27 AM EDT -----  Please call this patient and let him know I am going to order a sacroiliac joint injection for him in hopes that this will alleviate some of his new right sided low back pain. He can still take the steroid pack as directed.

## 2024-05-08 NOTE — TELEPHONE ENCOUNTER
Procedure scheduled 5/29/24.    Reviewed instructions: , NPO 1 hour prior, loose-fitting/comfortable clothes, if ill/fever/infx/abx to call and reschedule.   Patient stated verbal understanding.

## 2024-05-09 NOTE — TELEPHONE ENCOUNTER
Caller: Rojelio     Doctor: Dima     Reason for call: Patient calling asking to speak to nurse please advise     Call back#: [phone]

## 2024-06-17 ENCOUNTER — APPOINTMENT (OUTPATIENT)
Dept: LAB | Facility: CLINIC | Age: 49
End: 2024-06-17
Payer: COMMERCIAL

## 2024-06-17 DIAGNOSIS — Z12.5 PROSTATE CANCER SCREENING: ICD-10-CM

## 2024-06-17 LAB — PSA SERPL-MCNC: 0.56 NG/ML (ref 0–4)

## 2024-06-17 PROCEDURE — 84153 ASSAY OF PSA TOTAL: CPT

## 2024-06-17 PROCEDURE — 36415 COLL VENOUS BLD VENIPUNCTURE: CPT

## 2024-07-09 DIAGNOSIS — N52.8 OTHER MALE ERECTILE DYSFUNCTION: ICD-10-CM

## 2024-07-10 RX ORDER — TADALAFIL 2.5 MG/1
2.5 TABLET ORAL DAILY
Qty: 100 TABLET | Refills: 1 | Status: SHIPPED | OUTPATIENT
Start: 2024-07-10

## 2024-07-29 ENCOUNTER — TELEPHONE (OUTPATIENT)
Age: 49
End: 2024-07-29

## 2024-07-29 NOTE — TELEPHONE ENCOUNTER
Voicemail left for the patient  stating he does bot need another PSA prior to his visit.  Keep scheduled visit for 7/31/20024.

## 2024-07-29 NOTE — TELEPHONE ENCOUNTER
Patient called stating he has appoinment on 07/31/24 with Eliz at [facility]. He had a psa done 06/17/24 and he wants to know if he needs to repeat it .  He would need a new order and a call back so he can go today to do it.       Patient can be reached at [phone]

## 2024-07-30 PROBLEM — N40.0 BPH (BENIGN PROSTATIC HYPERPLASIA): Status: ACTIVE | Noted: 2024-07-30

## 2024-07-30 PROBLEM — Z12.5 SCREENING FOR PROSTATE CANCER: Status: ACTIVE | Noted: 2024-07-30

## 2024-07-30 NOTE — PROGRESS NOTES
Ambulatory Visit  Name: Rojelio Lorenzo      : 1975      MRN: [id number]  Encounter Provider: Eliz Jones PA-C  Encounter Date: 2024   Encounter department: [facility] UROLOGY [place]      Assessment & Plan   1. Benign prostatic hyperplasia with urinary frequency  Assessment & Plan:  AUA score 11 today in office   PVR 0 mL - no sign of retention today in office   Discontinue flomax 0.4 mg daily   Patient reports NO relief while on medication  Encouraged increased hydration and avoidance of bladder irritants / constipation   Continue to monitor on an annual basis  Orders:  -     POCT Measure PVR  2. Screening for prostate cancer  Assessment & Plan:  Most recent PSA from 24 was 0.561  Component  Ref Range & Units 24  1:38 PM 23  8:45 AM   PSA, Diagnostic  0.000 - 4.000 ng/mL 0.561 0.64 R, CM     SELINA completed 2024 - normal, no nodules or irregularities palpated   Denies family history of prostate cancer   Continue to monitor PSA/SELINA on an annual basis     Orders:  -     POCT Measure PVR  -     PSA, Total Screen; Future; Expected date: 2025  3. Other male erectile dysfunction  Assessment & Plan:  Continue Cialis 2.5 mg daily as prescribed  Refill as needed  Patient is overall content with medication results  Orders:  -     POCT Measure PVR  4. OAB (overactive bladder)  Assessment & Plan:  Urinary frequency/urgency persisted despite being on Flomax 0.4 mg for 6 months  Recommend trialing oxybutynin 10 mg daily to see if symptoms are more overactive bladder in nature  Reach out to office in 30 days to rediscuss if patient needs a refill or to trial a different medication  Continue avoiding liquids 90 minutes before bed  Follow-up in 6 months to reevaluate symptoms  Orders:  -     oxybutynin (DITROPAN-XL) 10 MG 24 hr tablet; Take 1 tablet (10 mg total) by mouth daily at bedtime      History of Present Illness     Rojelio Lorenzo is a 48 y.o. male who presents  For 6-month follow-up to discuss urinary frequency/urgency, prostate cancer screening, and erectile dysfunction.  Patient was previously initiated on Flomax 0.4 mg daily.  He states he has not noticed any relief while on the medication.  AUA score is 11 today in office.  His main complaints are urinary frequency/urgency throughout the day as well as nocturia 2-3 times nightly.  Symptoms are pointing to more of an overactive bladder type picture.  Discontinue Flomax at this time and begin taking oxybutynin 10 mg daily.  Reach out to the office after 1 month of the medication to see if he prefers a refill or for different medication needs to be trialed.  Patient vocalizes understanding.  PVR in office is 0 mL, no signs of urinary retention.  He continues to maintain adequate hydration, avoid bladder irritants and constipation.  He tries not to drink any water 90 minutes before going to bed.  He currently denies dysuria, hematuria, incomplete emptying, or pain in the bladder/bilateral flanks.  Follow up in 6 months to re-evaluate symptoms.    The patient is also on 2.5 mg Cialis for erectile dysfunction, no refills needed at this time. Patient's most recent PSA from 6/17/2024 was 0.561.  SELINA was completed in January 2024 and was benign, no nodules or irregularities palpated.  He denies family history of prostate cancer.  Continue to monitor PSA and SELINA on annual basis.    Review of Systems   Constitutional:  Negative for activity change, chills, fatigue and fever.   Respiratory:  Negative for apnea, cough and shortness of breath.    Cardiovascular:  Negative for chest pain and leg swelling.   Gastrointestinal:  Negative for abdominal distention, abdominal pain, constipation, diarrhea, nausea and vomiting.   Genitourinary:  Positive for frequency and urgency. Negative for decreased urine volume, difficulty urinating, dysuria, flank pain, hematuria, penile pain and testicular pain.        Nocturia 2-3x nightly    Neurological:  Negative for dizziness and headaches.   Psychiatric/Behavioral: Negative.       Medical History Reviewed by provider this encounter:  Tobacco  Allergies  Meds  Problems  Med Hx  Surg Hx  Fam Hx       Current Outpatient Medications on File Prior to Visit   Medication Sig Dispense Refill    Boswellia-Glucosamine-Vit D (Osteo Bi-Flex One Per Day) TABS Take 2 tablets by mouth in the morning      Cholecalciferol (Vitamin D3) 125 MCG (5000 UT) TABS Take 5,000 Units by mouth daily      cyclobenzaprine (FLEXERIL) 10 mg tablet Take 1 tablet (10 mg total) by mouth daily as needed for muscle spasms 30 tablet 0    FLUoxetine (PROzac) 40 MG capsule Take 1 capsule (40 mg total) by mouth daily 90 capsule 2    losartan (COZAAR) 100 MG tablet Take 1 tablet (100 mg total) by mouth daily 90 tablet 2    Multiple Vitamin (multivitamin) tablet Take 1 tablet by mouth daily      tadalafil (CIALIS) 2.5 MG tablet Take 1 tablet (2.5 mg total) by mouth daily 100 tablet 1    tamsulosin (FLOMAX) 0.4 mg Take 1 capsule (0.4 mg total) by mouth daily with dinner 90 capsule 3    [facility] FIBER GUMMIES PO Take 2 tablets by mouth in the morning (Patient not taking: Reported on 2024)      methylPREDNISolone 4 MG tablet therapy pack Use as directed on package (Patient not taking: Reported on 2024) 1 each 0     No current facility-administered medications on file prior to visit.      Social History     Tobacco Use    Smoking status: Former     Current packs/day: 0.00     Average packs/day: 1.5 packs/day for 19.0 years (28.5 ttl pk-yrs)     Types: Cigarettes     Start date: 1992     Quit date: 2011     Years since quittin.5     Passive exposure: Never    Smokeless tobacco: Never   Vaping Use    Vaping status: Never Used   Substance and Sexual Activity    Alcohol use: Yes     Alcohol/week: 6.0 standard drinks of alcohol     Types: 3 Glasses of wine, 3 Standard drinks or equivalent per week     Comment: occasionally,  "socially    Drug use: Not Currently     Types: Marijuana     Comment: Last use 22yo    Sexual activity: Yes     Partners: Female     Birth control/protection: None       Objective     /78 (BP Location: Left arm, Patient Position: Sitting, Cuff Size: Adult)   Pulse 78   Ht 5' 10\" (1.778 m)   Wt 88 kg (194 lb)   SpO2 98%   BMI 27.84 kg/m²   Physical Exam  Vitals and nursing note reviewed.   Constitutional:       General: He is not in acute distress.     Appearance: Normal appearance. He is well-developed.   HENT:      Head: Normocephalic and atraumatic.   Eyes:      Conjunctiva/sclera: Conjunctivae normal.   Cardiovascular:      Rate and Rhythm: Normal rate and regular rhythm.      Heart sounds: No murmur heard.  Pulmonary:      Effort: Pulmonary effort is normal. No respiratory distress.      Breath sounds: Normal breath sounds.   Abdominal:      General: Abdomen is flat. There is no distension.      Palpations: Abdomen is soft.      Tenderness: There is no abdominal tenderness.   Musculoskeletal:         General: No swelling.      Cervical back: Neck supple.   Skin:     General: Skin is warm and dry.      Capillary Refill: Capillary refill takes less than 2 seconds.   Neurological:      Mental Status: He is alert.   Psychiatric:         Mood and Affect: Mood normal.       Results  Lab Results   Component Value Date    PSA 0.561 06/17/2024    PSA 0.64 05/26/2023     Lab Results   Component Value Date    CALCIUM 9.4 02/16/2024    K 4.1 02/16/2024    CO2 30 02/16/2024     02/16/2024    BUN 20 02/16/2024    CREATININE 1.02 02/16/2024     Lab Results   Component Value Date    WBC 5.94 02/16/2024    HGB 15.7 02/16/2024    HCT 47.5 02/16/2024    MCV 96 02/16/2024     02/16/2024       Office Urine Dip  Recent Results (from the past 1 hour(s))   POCT Measure PVR    Collection Time: 07/31/24  2:39 PM   Result Value Ref Range    POST-VOID RESIDUAL VOLUME, ML POC 0 mL   ]        "

## 2024-07-30 NOTE — ASSESSMENT & PLAN NOTE
AUA score 11 today in office   PVR 0 mL - no sign of retention today in office   Discontinue flomax 0.4 mg daily   Patient reports NO relief while on medication  Encouraged increased hydration and avoidance of bladder irritants / constipation   Continue to monitor on an annual basis

## 2024-07-30 NOTE — ASSESSMENT & PLAN NOTE
Most recent PSA from 6/17/24 was 0.561  Component  Ref Range & Units 6/17/24  1:38 PM 5/26/23  8:45 AM   PSA, Diagnostic  0.000 - 4.000 ng/mL 0.561 0.64 R, CM     SELINA completed January 2024 - normal, no nodules or irregularities palpated   Denies family history of prostate cancer   Continue to monitor PSA/SELINA on an annual basis

## 2024-07-30 NOTE — ASSESSMENT & PLAN NOTE
Continue Cialis 2.5 mg daily as prescribed  Refill as needed  Patient is overall content with medication results

## 2024-07-31 ENCOUNTER — OFFICE VISIT (OUTPATIENT)
Dept: UROLOGY | Facility: HOSPITAL | Age: 49
End: 2024-07-31
Payer: COMMERCIAL

## 2024-07-31 VITALS
WEIGHT: 194 LBS | HEIGHT: 70 IN | HEART RATE: 78 BPM | OXYGEN SATURATION: 98 % | BODY MASS INDEX: 27.77 KG/M2 | DIASTOLIC BLOOD PRESSURE: 78 MMHG | SYSTOLIC BLOOD PRESSURE: 132 MMHG

## 2024-07-31 DIAGNOSIS — N52.8 OTHER MALE ERECTILE DYSFUNCTION: ICD-10-CM

## 2024-07-31 DIAGNOSIS — N40.1 BENIGN PROSTATIC HYPERPLASIA WITH URINARY FREQUENCY: Primary | ICD-10-CM

## 2024-07-31 DIAGNOSIS — R35.0 BENIGN PROSTATIC HYPERPLASIA WITH URINARY FREQUENCY: Primary | ICD-10-CM

## 2024-07-31 DIAGNOSIS — N32.81 OAB (OVERACTIVE BLADDER): ICD-10-CM

## 2024-07-31 DIAGNOSIS — Z12.5 SCREENING FOR PROSTATE CANCER: ICD-10-CM

## 2024-07-31 LAB — POST-VOID RESIDUAL VOLUME, ML POC: 0 ML

## 2024-07-31 PROCEDURE — 51798 US URINE CAPACITY MEASURE: CPT

## 2024-07-31 PROCEDURE — 99214 OFFICE O/P EST MOD 30 MIN: CPT

## 2024-07-31 RX ORDER — OXYBUTYNIN CHLORIDE 10 MG/1
10 TABLET, EXTENDED RELEASE ORAL
Qty: 30 TABLET | Refills: 0 | Status: SHIPPED | OUTPATIENT
Start: 2024-07-31

## 2024-07-31 NOTE — ASSESSMENT & PLAN NOTE
Urinary frequency/urgency persisted despite being on Flomax 0.4 mg for 6 months  Recommend trialing oxybutynin 10 mg daily to see if symptoms are more overactive bladder in nature  Reach out to office in 30 days to rediscuss if patient needs a refill or to trial a different medication  Continue avoiding liquids 90 minutes before bed  Follow-up in 6 months to reevaluate symptoms

## 2024-08-05 ENCOUNTER — OCCMED (OUTPATIENT)
Dept: URGENT CARE | Facility: CLINIC | Age: 49
End: 2024-08-05
Payer: OTHER MISCELLANEOUS

## 2024-08-05 ENCOUNTER — APPOINTMENT (OUTPATIENT)
Dept: RADIOLOGY | Facility: CLINIC | Age: 49
End: 2024-08-05
Payer: OTHER MISCELLANEOUS

## 2024-08-05 DIAGNOSIS — S39.012A STRAIN OF MUSCLE, FASCIA AND TENDON OF LOWER BACK, INITIAL ENCOUNTER: ICD-10-CM

## 2024-08-05 DIAGNOSIS — S39.012A STRAIN OF MUSCLE, FASCIA AND TENDON OF LOWER BACK, INITIAL ENCOUNTER: Primary | ICD-10-CM

## 2024-08-05 PROCEDURE — 99283 EMERGENCY DEPT VISIT LOW MDM: CPT | Performed by: NURSE PRACTITIONER

## 2024-08-05 PROCEDURE — G0382 LEV 3 HOSP TYPE B ED VISIT: HCPCS | Performed by: NURSE PRACTITIONER

## 2024-08-05 PROCEDURE — 72100 X-RAY EXAM L-S SPINE 2/3 VWS: CPT

## 2024-08-08 ENCOUNTER — OCCMED (OUTPATIENT)
Dept: URGENT CARE | Facility: CLINIC | Age: 49
End: 2024-08-08
Payer: OTHER MISCELLANEOUS
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
## 2024-08-08 PROCEDURE — 99213 OFFICE O/P EST LOW 20 MIN: CPT | Performed by: NURSE PRACTITIONER

## 2024-08-29 PROBLEM — Z12.5 SCREENING FOR PROSTATE CANCER: Status: RESOLVED | Noted: 2024-07-30 | Resolved: 2024-08-29

## 2024-09-03 DIAGNOSIS — F32.0 CURRENT MILD EPISODE OF MAJOR DEPRESSIVE DISORDER, UNSPECIFIED WHETHER RECURRENT (HCC): ICD-10-CM

## 2024-09-03 DIAGNOSIS — F41.9 ANXIETY: ICD-10-CM

## 2024-09-04 RX ORDER — FLUOXETINE 40 MG/1
40 CAPSULE ORAL DAILY
Qty: 90 CAPSULE | Refills: 1 | Status: SHIPPED | OUTPATIENT
Start: 2024-09-04

## 2024-11-06 DIAGNOSIS — M79.18 MYOFASCIAL PAIN SYNDROME: ICD-10-CM

## 2024-11-07 RX ORDER — CYCLOBENZAPRINE HCL 10 MG
10 TABLET ORAL DAILY PRN
Qty: 90 TABLET | Refills: 0 | Status: SHIPPED | OUTPATIENT
Start: 2024-11-07

## 2024-11-20 ENCOUNTER — OFFICE VISIT (OUTPATIENT)
Dept: URGENT CARE | Facility: CLINIC | Age: 49
End: 2024-11-20
Payer: COMMERCIAL

## 2024-11-20 VITALS
SYSTOLIC BLOOD PRESSURE: 134 MMHG | DIASTOLIC BLOOD PRESSURE: 80 MMHG | OXYGEN SATURATION: 97 % | TEMPERATURE: 98 F | WEIGHT: 187 LBS | HEIGHT: 70 IN | HEART RATE: 74 BPM | RESPIRATION RATE: 18 BRPM | BODY MASS INDEX: 26.77 KG/M2

## 2024-11-20 DIAGNOSIS — R22.2 SUPRACLAVICULAR FOSSA FULLNESS: ICD-10-CM

## 2024-11-20 DIAGNOSIS — J20.9 ACUTE BRONCHITIS, UNSPECIFIED ORGANISM: ICD-10-CM

## 2024-11-20 DIAGNOSIS — J02.9 PHARYNGITIS, UNSPECIFIED ETIOLOGY: Primary | ICD-10-CM

## 2024-11-20 LAB — S PYO AG THROAT QL: NEGATIVE

## 2024-11-20 PROCEDURE — S9083 URGENT CARE CENTER GLOBAL: HCPCS | Performed by: PHYSICIAN ASSISTANT

## 2024-11-20 PROCEDURE — 87070 CULTURE OTHR SPECIMN AEROBIC: CPT | Performed by: PHYSICIAN ASSISTANT

## 2024-11-20 PROCEDURE — G0382 LEV 3 HOSP TYPE B ED VISIT: HCPCS | Performed by: PHYSICIAN ASSISTANT

## 2024-11-20 PROCEDURE — 87880 STREP A ASSAY W/OPTIC: CPT | Performed by: PHYSICIAN ASSISTANT

## 2024-11-20 RX ORDER — AMOXICILLIN 500 MG/1
500 CAPSULE ORAL EVERY 12 HOURS SCHEDULED
Qty: 20 CAPSULE | Refills: 0 | Status: SHIPPED | OUTPATIENT
Start: 2024-11-20 | End: 2024-11-30

## 2024-11-20 RX ORDER — DEXTROMETHORPHAN HYDROBROMIDE AND PROMETHAZINE HYDROCHLORIDE 15; 6.25 MG/5ML; MG/5ML
SYRUP ORAL
Qty: 120 ML | Refills: 0 | Status: SHIPPED | OUTPATIENT
Start: 2024-11-20

## 2024-11-20 RX ORDER — PREDNISONE 20 MG/1
TABLET ORAL
Qty: 10 TABLET | Refills: 0 | Status: SHIPPED | OUTPATIENT
Start: 2024-11-20

## 2024-11-20 NOTE — PROGRESS NOTES
[facility]    NAME: Rojelio Lorenzo is a 49 y.o. male  : 1975    MRN: [id number]  DATE: 2024  TIME: 10:19 AM    Assessment and Plan   Pharyngitis, unspecified etiology [J02.9]  1. Pharyngitis, unspecified etiology  amoxicillin (AMOXIL) 500 mg capsule    POCT rapid ANTIGEN strepA    Throat culture    Throat culture      2. Acute bronchitis, unspecified organism  predniSONE 20 mg tablet    promethazine-dextromethorphan (PHENERGAN-DM) 6.25-15 mg/5 mL oral syrup      3. Supraclavicular fossa fullness            Patient Instructions     Patient Instructions   Take antibiotic as instructed.  Take prednisone as instructed.  While on prednisone do not take any ibuprofen or ibuprofen like products.  May safely take Tylenol if needed.  Prescription cough medicine for home use only.  Do not take with any other potential sedating products.    Continue to push fluids.  Throat lozenges for comfort as needed.  Warm salt water gargles every couple hours while awake as needed for sore throat.    Contact your primary care provider office as soon as possible to arrange follow-up for further evaluation of fullness right supraclavicular region.    Work note given.    Chief Complaint     Chief Complaint   Patient presents with    Cold Like Symptoms     Congestion, cough, and sore throat for over 1 week. Pt has taken OTC medication but it has not helped.       History of Present Illness   Rojelio Lorenzo presents to the clinic c/o  49-year-old male comes in with cough and congestion.    Started: Patient reports he has had sore throat for about a week.  Associated signs and symptoms: A lot of pain with swallowing, increased coughing, postnasal drip.  Modifying factors: Multiple over-the-counter's without any relief.  Known Exposures: Works as a paramedic.  Recent travel:  No.  Hx asthma:  No.  Hx pneumonia:  No.  Smoker: No.        Review of Systems   Review of Systems   Constitutional:  Positive for activity  change and fatigue. Negative for appetite change, chills, diaphoresis, fever and unexpected weight change.   HENT:  Positive for congestion, postnasal drip, rhinorrhea, sore throat and trouble swallowing. Negative for ear discharge, ear pain, facial swelling, hearing loss, sinus pressure, sinus pain and voice change.    Eyes:  Negative for photophobia, pain, discharge, redness, itching and visual disturbance.   Respiratory:  Positive for cough and chest tightness. Negative for apnea, choking, shortness of breath, wheezing and stridor.    Cardiovascular: Negative.    Gastrointestinal: Negative.    Skin:  Negative for rash.   Hematological:  Negative for adenopathy.       Current Medications     Long-Term Medications   Medication Sig Dispense Refill    Cholecalciferol (Vitamin D3) 125 MCG (5000 UT) TABS Take 5,000 Units by mouth daily      cyclobenzaprine (FLEXERIL) 10 mg tablet Take 1 tablet (10 mg total) by mouth daily as needed for muscle spasms 90 tablet 0    FLUoxetine (PROzac) 40 MG capsule Take 1 capsule (40 mg total) by mouth daily 90 capsule 1    losartan (COZAAR) 100 MG tablet Take 1 tablet (100 mg total) by mouth daily 90 tablet 2    Multiple Vitamin (multivitamin) tablet Take 1 tablet by mouth daily      oxybutynin (DITROPAN-XL) 10 MG 24 hr tablet Take 1 tablet (10 mg total) by mouth daily at bedtime 30 tablet 0    tadalafil (CIALIS) 2.5 MG tablet Take 1 tablet (2.5 mg total) by mouth daily 100 tablet 1    tamsulosin (FLOMAX) 0.4 mg Take 1 capsule (0.4 mg total) by mouth daily with dinner 90 capsule 3       Current Allergies     Allergies as of 11/20/2024    (No Known Allergies)          The following portions of the patient's history were reviewed and updated as appropriate: allergies, current medications, past family history, past medical history, past social history, past surgical history and problem list.  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    Depression     ED (erectile dysfunction)     History of Graves' disease     s/p Tapazole x 2 years, then radioactive iodine, which resulted in euthyroid status    Hyperlipidemia     Hypertension     Low testosterone 05/22/2023    Lumbar spondylosis 11/16/2020    Myofascial pain syndrome of lumbar spine 12/14/2020    Overweight     Vitamin D deficiency      Past Surgical History:   Procedure Laterality Date    NO PAST SURGERIES       Family History   Problem Relation Age of Onset    Hypertension Mother     Diabetes type II Mother     Stomach cancer Mother 53    Depression Mother     Hyperlipidemia Mother     Mental illness Mother     Cancer Mother     Diabetes Mother     Depression Father     Mental retardation Brother     No Known Problems Daughter        Objective   /80   Pulse 74   Temp 98 °F (36.7 °C)   Resp 18   Ht 5' 10\" (1.778 m)   Wt 84.8 kg (187 lb)   SpO2 97%   BMI 26.83 kg/m²   No LMP for male patient.       Physical Exam     Physical Exam  Vitals and nursing note reviewed.   Constitutional:       General: He is not in acute distress.     Appearance: He is well-developed. He is ill-appearing. He is not toxic-appearing or diaphoretic.      Comments: No trismus or conversational dyspnea.  Appears mildly ill but in no acute distress.   HENT:      Head: Normocephalic and atraumatic.      Right Ear: Tympanic membrane, ear canal and external ear normal.      Left Ear: Tympanic membrane, ear canal and external ear normal.      Nose: Congestion present. No rhinorrhea.      Mouth/Throat:      Mouth: Mucous membranes are moist.      Pharynx: Posterior oropharyngeal erythema present. No oropharyngeal exudate.      Comments: Increased swelling tonsillar pharyngeal region with increased redness.  No obvious exudate.  Uvula slightly swollen.  Eyes:      General: No scleral icterus.        Right eye: No discharge.         Left eye: No discharge.     "  Conjunctiva/sclera: Conjunctivae normal.      Pupils: Pupils are equal, round, and reactive to light.   Neck:      Trachea: No tracheal deviation.   Cardiovascular:      Rate and Rhythm: Normal rate and regular rhythm.      Heart sounds: Normal heart sounds. No murmur heard.     No friction rub. No gallop.   Pulmonary:      Effort: Pulmonary effort is normal. No respiratory distress.      Breath sounds: Normal breath sounds. No stridor. No wheezing, rhonchi or rales.   Musculoskeletal:      Cervical back: Normal range of motion and neck supple. No rigidity or tenderness.   Lymphadenopathy:      Cervical: No cervical adenopathy.      Upper Body:      Right upper body: Supraclavicular adenopathy present.      Left upper body: No supraclavicular adenopathy.      Comments: Right supraclavicular fullness compared to left.   Skin:     General: Skin is warm and dry.      Coloration: Skin is not jaundiced or pale.      Findings: No bruising or rash.      Comments: No acute rashes   Neurological:      Mental Status: He is alert and oriented to person, place, and time.   Psychiatric:         Mood and Affect: Mood normal.         Behavior: Behavior normal.

## 2024-11-20 NOTE — PATIENT INSTRUCTIONS
Take antibiotic as instructed.  Take prednisone as instructed.  While on prednisone do not take any ibuprofen or ibuprofen like products.  May safely take Tylenol if needed.  Prescription cough medicine for home use only.  Do not take with any other potential sedating products.    Continue to push fluids.  Throat lozenges for comfort as needed.  Warm salt water gargles every couple hours while awake as needed for sore throat.    Contact your primary care provider office as soon as possible to arrange follow-up for further evaluation of fullness right supraclavicular region.    Work note given.

## 2024-11-20 NOTE — LETTER
November 20, 2024     Patient: Rojelio Lorenzo   YOB: 1975   Date of Visit: 11/20/2024       To Whom It May Concern:    Patient was seen in office today for acute medical concern.          Sincerely,        Ina Pham PA-C    CC: No Recipients

## 2024-11-22 LAB — BACTERIA THROAT CULT: NORMAL

## 2024-11-25 DIAGNOSIS — M79.18 MYOFASCIAL PAIN SYNDROME: ICD-10-CM

## 2024-11-25 NOTE — TELEPHONE ENCOUNTER
CG can you send this script to [facility] in [place] per pt request [facility]

## 2024-11-26 RX ORDER — CYCLOBENZAPRINE HCL 10 MG
10 TABLET ORAL DAILY PRN
Qty: 90 TABLET | Refills: 1 | Status: SHIPPED | OUTPATIENT
Start: 2024-11-26

## 2024-12-06 ENCOUNTER — TELEPHONE (OUTPATIENT)
Dept: FAMILY MEDICINE CLINIC | Facility: CLINIC | Age: 49
End: 2024-12-06

## 2024-12-06 DIAGNOSIS — N52.8 OTHER MALE ERECTILE DYSFUNCTION: ICD-10-CM

## 2024-12-06 DIAGNOSIS — I10 PRIMARY HYPERTENSION: ICD-10-CM

## 2024-12-06 RX ORDER — LOSARTAN POTASSIUM 100 MG/1
100 TABLET ORAL DAILY
Qty: 90 TABLET | Refills: 1 | Status: SHIPPED | OUTPATIENT
Start: 2024-12-06

## 2024-12-06 RX ORDER — TADALAFIL 2.5 MG/1
2.5 TABLET ORAL DAILY
Qty: 100 TABLET | Refills: 1 | Status: SHIPPED | OUTPATIENT
Start: 2024-12-06 | End: 2024-12-08

## 2024-12-06 NOTE — TELEPHONE ENCOUNTER
PA for tadalafil 2.5 mg SUBMITTED to [facility]    via    []CMM-KEY:   [x]Surescripts-Case ID # [id number]   []Availity-Auth ID # NDC #  []Faxed to plan   []Other website   []Phone call Case ID #     []PA sent as URGENT    All office notes, labs and other pertaining documents and studies sent. Clinical questions answered. Awaiting determination from insurance company.     Turnaround time for your insurance to make a decision on your Prior Authorization can take 7-21 business days.

## 2024-12-06 NOTE — TELEPHONE ENCOUNTER
Rojelio Navarrete,     Just received a refill for tadalafil (CIALIS) 2.5 MG tablet  does the patient pay out of pocket?

## 2024-12-06 NOTE — TELEPHONE ENCOUNTER
Patient called the RX Refill Line. Message is being forwarded to the office.     Patient is requesting call back in regard to Tadalafil    Please contact patient at [phone]

## 2024-12-08 RX ORDER — TADALAFIL 2.5 MG/1
2.5 TABLET ORAL DAILY
Qty: 100 TABLET | Refills: 1 | Status: SHIPPED | OUTPATIENT
Start: 2024-12-08

## 2024-12-08 NOTE — TELEPHONE ENCOUNTER
Please ask patient to direct his request to [facility].      Per [facility] note 7/31/24 Eliz Jones PA-C -    3. Other male erectile dysfunction  Assessment & Plan:  Continue Cialis 2.5 mg daily as prescribed  Refill as needed  Patient is overall content with medication results      Rx request forwarded to [facility].

## 2024-12-13 NOTE — TELEPHONE ENCOUNTER
PA for tadalafil 2.5 mg  NOT REQUIRED     Reason (screenshot if applicable)          Patient advised by          [x] MyChart Message  [] Phone call   []LMOM  []L/M to call office as no active Communication consent on file  []Unable to leave detailed message as VM not approved on Communication consent       Pharmacy advised by    []Fax  []Phone call

## 2024-12-22 DIAGNOSIS — F41.9 ANXIETY: ICD-10-CM

## 2024-12-22 DIAGNOSIS — F32.0 CURRENT MILD EPISODE OF MAJOR DEPRESSIVE DISORDER, UNSPECIFIED WHETHER RECURRENT (HCC): ICD-10-CM

## 2024-12-24 RX ORDER — FLUOXETINE 40 MG/1
40 CAPSULE ORAL DAILY
Qty: 30 CAPSULE | Refills: 0 | Status: SHIPPED | OUTPATIENT
Start: 2024-12-24

## 2024-12-24 NOTE — TELEPHONE ENCOUNTER
Patient called to request a refill for their Fluoxetine advised a refill was requested on 12/24/2024 and is pending approval. Patient verbalized understanding and is in agreement.    Patient stated that he is completely out of the medication

## 2025-01-23 DIAGNOSIS — F41.9 ANXIETY: ICD-10-CM

## 2025-01-23 DIAGNOSIS — F32.0 CURRENT MILD EPISODE OF MAJOR DEPRESSIVE DISORDER, UNSPECIFIED WHETHER RECURRENT (HCC): ICD-10-CM

## 2025-01-24 RX ORDER — FLUOXETINE 40 MG/1
40 CAPSULE ORAL DAILY
Qty: 30 CAPSULE | Refills: 0 | OUTPATIENT
Start: 2025-01-24

## 2025-01-24 NOTE — TELEPHONE ENCOUNTER
Refill denied as patient is overdue for appt and courtesy refill already sent.    Last seen by me 2/21/24 for physical.    Return in about 4 months (around 6/21/2024) for 4mo - HTN, HL, Anx/Dep, LBP, ED, Labs; 4/9/24 - Pre-Op L Bunionectomy 5/8/24.     Please schedule -     Return in about 4 months (around 6/21/2024) for 4mo - HTN, HL, Anx/Dep, LBP, ED, Labs    If after 2/21/25, please schedule -     Return in about 4 months (around 6/21/2024) for Physical / 4mo - HTN, HL, Anx/Dep, LBP, ED, Labs    Last FBW Rx 2/21/24, Expected 6/20/24 - please ask pt to complete prior to appt.

## 2025-01-27 ENCOUNTER — PATIENT MESSAGE (OUTPATIENT)
Dept: FAMILY MEDICINE CLINIC | Facility: CLINIC | Age: 50
End: 2025-01-27

## 2025-01-27 DIAGNOSIS — Z13.6 SCREENING FOR CARDIOVASCULAR CONDITION: ICD-10-CM

## 2025-01-27 DIAGNOSIS — F32.0 CURRENT MILD EPISODE OF MAJOR DEPRESSIVE DISORDER, UNSPECIFIED WHETHER RECURRENT (HCC): ICD-10-CM

## 2025-01-27 DIAGNOSIS — F41.9 ANXIETY: ICD-10-CM

## 2025-01-27 DIAGNOSIS — Z13.1 SCREENING FOR DIABETES MELLITUS: ICD-10-CM

## 2025-01-27 DIAGNOSIS — I10 PRIMARY HYPERTENSION: Primary | ICD-10-CM

## 2025-01-27 DIAGNOSIS — E55.9 VITAMIN D DEFICIENCY: ICD-10-CM

## 2025-01-27 DIAGNOSIS — E66.3 OVERWEIGHT: ICD-10-CM

## 2025-01-27 DIAGNOSIS — E78.5 HYPERLIPIDEMIA, UNSPECIFIED HYPERLIPIDEMIA TYPE: ICD-10-CM

## 2025-01-27 RX ORDER — FLUOXETINE 40 MG/1
40 CAPSULE ORAL DAILY
Qty: 30 CAPSULE | Refills: 0 | Status: SHIPPED | OUTPATIENT
Start: 2025-01-27 | End: 2025-01-28 | Stop reason: SDUPTHER

## 2025-01-28 DIAGNOSIS — F41.9 ANXIETY: ICD-10-CM

## 2025-01-28 DIAGNOSIS — F32.0 CURRENT MILD EPISODE OF MAJOR DEPRESSIVE DISORDER, UNSPECIFIED WHETHER RECURRENT (HCC): ICD-10-CM

## 2025-01-28 RX ORDER — FLUOXETINE 40 MG/1
40 CAPSULE ORAL DAILY
Qty: 30 CAPSULE | Refills: 0 | Status: SHIPPED | OUTPATIENT
Start: 2025-01-28

## 2025-01-28 NOTE — TELEPHONE ENCOUNTER
Reason for call:   [x] Refill   [] Prior Auth  [x] Other: PCP sent to wrong pharmacy - not a duplicate     Office:   [x] PCP/Provider -   [] Specialty/Provider -     Medication: FLUoxetine (PROzac) 40 MG Take 1 capsule (40 mg total) by mouth daily     Pharmacy: [facility]     Does the patient have enough for 3 days?   [] Yes   [x] No - Send as HP to POD

## 2025-02-04 NOTE — TELEPHONE ENCOUNTER
Patient's insurance is no longer in network per separate encounter. Patient was provided with 30 day courtesy refill. Patient is actively looking for new PCP.   medium/soft

## 2025-02-17 ENCOUNTER — APPOINTMENT (OUTPATIENT)
Dept: LAB | Facility: MEDICAL CENTER | Age: 50
End: 2025-02-17

## 2025-02-17 ENCOUNTER — APPOINTMENT (OUTPATIENT)
Dept: URGENT CARE | Facility: MEDICAL CENTER | Age: 50
End: 2025-02-17

## 2025-02-17 DIAGNOSIS — Z02.1 PRE-EMPLOYMENT EXAMINATION: Primary | ICD-10-CM

## 2025-02-17 DIAGNOSIS — Z02.1 PRE-EMPLOYMENT EXAMINATION: ICD-10-CM

## 2025-02-17 PROCEDURE — 36415 COLL VENOUS BLD VENIPUNCTURE: CPT

## 2025-02-17 PROCEDURE — 86480 TB TEST CELL IMMUN MEASURE: CPT

## 2025-02-19 DIAGNOSIS — F32.0 CURRENT MILD EPISODE OF MAJOR DEPRESSIVE DISORDER, UNSPECIFIED WHETHER RECURRENT (HCC): ICD-10-CM

## 2025-02-19 DIAGNOSIS — F41.9 ANXIETY: ICD-10-CM

## 2025-02-19 LAB
GAMMA INTERFERON BACKGROUND BLD IA-ACNC: 0.06 IU/ML
M TB IFN-G BLD-IMP: NEGATIVE
M TB IFN-G CD4+ BCKGRND COR BLD-ACNC: 0.03 IU/ML
M TB IFN-G CD4+ BCKGRND COR BLD-ACNC: 0.08 IU/ML
MITOGEN IGNF BCKGRD COR BLD-ACNC: 9.94 IU/ML

## 2025-02-20 DIAGNOSIS — F32.0 CURRENT MILD EPISODE OF MAJOR DEPRESSIVE DISORDER, UNSPECIFIED WHETHER RECURRENT (HCC): ICD-10-CM

## 2025-02-20 DIAGNOSIS — F41.9 ANXIETY: ICD-10-CM

## 2025-02-20 RX ORDER — FLUOXETINE 40 MG/1
40 CAPSULE ORAL DAILY
Qty: 90 CAPSULE | Refills: 1 | OUTPATIENT
Start: 2025-02-20

## 2025-02-20 RX ORDER — FLUOXETINE 40 MG/1
40 CAPSULE ORAL DAILY
Qty: 90 CAPSULE | Refills: 0 | Status: SHIPPED | OUTPATIENT
Start: 2025-02-20

## 2025-02-20 NOTE — PATIENT COMMUNICATION
Clerical to please schedule appt, then I will eRx Prozac.      See orders for FBW.      Patient has appt 3/25/25.  eRx sent.

## 2025-03-18 ENCOUNTER — TELEPHONE (OUTPATIENT)
Dept: FAMILY MEDICINE CLINIC | Facility: CLINIC | Age: 50
End: 2025-03-18

## 2025-03-20 NOTE — TELEPHONE ENCOUNTER
Fax received from [employer] requesting prior authorization for tadalafil 2.5mg. Patient instructions are Take 1 tablet (2.5 mg total) by mouth daily Rx per [facility]     [facility] [id number]  Please initiate prior authorization.    
PA tadalafil (CIALIS) 2.5 MG APPROVED           Pharmacy advised by    [x]Fax  []Phone call  []Secure Chat    Specialty Pharmacy    []      
PA tadalafil (CIALIS) 2.5 MG SUBMITTED     to [employer]    via    []CMM-KEY:    [x]Surescripts-Case ID # [id number]  []Availity-Auth ID #  NDC #    []Faxed to plan   []Other website    []Phone call Case ID #      []PA sent as URGENT    All office notes, labs and other pertaining documents and studies sent. Clinical questions answered. Awaiting determination from insurance company.     Turnaround time for your insurance to make a decision on your Prior Authorization can take 7-21 business days.               
[FreeTextEntry1] : 73 yo female with a history of esrd on hd presents for follow up of right upper extremity avg.  pt reports left upper extremity avg is painful and increased in size
No

## 2025-03-24 ENCOUNTER — APPOINTMENT (OUTPATIENT)
Dept: LAB | Facility: CLINIC | Age: 50
End: 2025-03-24
Payer: COMMERCIAL

## 2025-03-24 ENCOUNTER — RESULTS FOLLOW-UP (OUTPATIENT)
Dept: FAMILY MEDICINE CLINIC | Facility: CLINIC | Age: 50
End: 2025-03-24

## 2025-03-24 DIAGNOSIS — F32.0 CURRENT MILD EPISODE OF MAJOR DEPRESSIVE DISORDER, UNSPECIFIED WHETHER RECURRENT (HCC): ICD-10-CM

## 2025-03-24 DIAGNOSIS — E66.3 OVERWEIGHT: ICD-10-CM

## 2025-03-24 DIAGNOSIS — Z12.5 SCREENING FOR PROSTATE CANCER: ICD-10-CM

## 2025-03-24 DIAGNOSIS — Z13.6 SCREENING FOR CARDIOVASCULAR CONDITION: ICD-10-CM

## 2025-03-24 DIAGNOSIS — Z13.1 SCREENING FOR DIABETES MELLITUS: ICD-10-CM

## 2025-03-24 DIAGNOSIS — F41.9 ANXIETY: ICD-10-CM

## 2025-03-24 DIAGNOSIS — I10 PRIMARY HYPERTENSION: ICD-10-CM

## 2025-03-24 DIAGNOSIS — E55.9 VITAMIN D DEFICIENCY: ICD-10-CM

## 2025-03-24 DIAGNOSIS — E78.5 HYPERLIPIDEMIA, UNSPECIFIED HYPERLIPIDEMIA TYPE: ICD-10-CM

## 2025-03-24 LAB
25(OH)D3 SERPL-MCNC: 36.3 NG/ML (ref 30–100)
ALBUMIN SERPL BCG-MCNC: 4.6 G/DL (ref 3.5–5)
ALP SERPL-CCNC: 56 U/L (ref 34–104)
ALT SERPL W P-5'-P-CCNC: 16 U/L (ref 7–52)
ANION GAP SERPL CALCULATED.3IONS-SCNC: 7 MMOL/L (ref 4–13)
AST SERPL W P-5'-P-CCNC: 17 U/L (ref 13–39)
BASOPHILS # BLD AUTO: 0.04 THOUSANDS/ÂΜL (ref 0–0.1)
BASOPHILS NFR BLD AUTO: 1 % (ref 0–1)
BILIRUB SERPL-MCNC: 0.51 MG/DL (ref 0.2–1)
BUN SERPL-MCNC: 20 MG/DL (ref 5–25)
CALCIUM SERPL-MCNC: 9.6 MG/DL (ref 8.4–10.2)
CHLORIDE SERPL-SCNC: 103 MMOL/L (ref 96–108)
CHOLEST SERPL-MCNC: 180 MG/DL (ref ?–200)
CO2 SERPL-SCNC: 28 MMOL/L (ref 21–32)
CREAT SERPL-MCNC: 1.14 MG/DL (ref 0.6–1.3)
EOSINOPHIL # BLD AUTO: 0.09 THOUSAND/ÂΜL (ref 0–0.61)
EOSINOPHIL NFR BLD AUTO: 1 % (ref 0–6)
ERYTHROCYTE [DISTWIDTH] IN BLOOD BY AUTOMATED COUNT: 12.6 % (ref 11.6–15.1)
EST. AVERAGE GLUCOSE BLD GHB EST-MCNC: 111 MG/DL
GFR SERPL CREATININE-BSD FRML MDRD: 74 ML/MIN/1.73SQ M
GLUCOSE P FAST SERPL-MCNC: 92 MG/DL (ref 65–99)
HBA1C MFR BLD: 5.5 %
HCT VFR BLD AUTO: 48.1 % (ref 36.5–49.3)
HDLC SERPL-MCNC: 33 MG/DL
HGB BLD-MCNC: 15.8 G/DL (ref 12–17)
IMM GRANULOCYTES # BLD AUTO: 0.02 THOUSAND/UL (ref 0–0.2)
IMM GRANULOCYTES NFR BLD AUTO: 0 % (ref 0–2)
LDLC SERPL CALC-MCNC: 82 MG/DL (ref 0–100)
LDLC SERPL DIRECT ASSAY-MCNC: 102 MG/DL (ref 0–100)
LYMPHOCYTES # BLD AUTO: 2.61 THOUSANDS/ÂΜL (ref 0.6–4.47)
LYMPHOCYTES NFR BLD AUTO: 40 % (ref 14–44)
MCH RBC QN AUTO: 31.8 PG (ref 26.8–34.3)
MCHC RBC AUTO-ENTMCNC: 32.8 G/DL (ref 31.4–37.4)
MCV RBC AUTO: 97 FL (ref 82–98)
MONOCYTES # BLD AUTO: 0.67 THOUSAND/ÂΜL (ref 0.17–1.22)
MONOCYTES NFR BLD AUTO: 10 % (ref 4–12)
NEUTROPHILS # BLD AUTO: 3.1 THOUSANDS/ÂΜL (ref 1.85–7.62)
NEUTS SEG NFR BLD AUTO: 48 % (ref 43–75)
NONHDLC SERPL-MCNC: 147 MG/DL
NRBC BLD AUTO-RTO: 0 /100 WBCS
PLATELET # BLD AUTO: 189 THOUSANDS/UL (ref 149–390)
PMV BLD AUTO: 10.8 FL (ref 8.9–12.7)
POTASSIUM SERPL-SCNC: 4.1 MMOL/L (ref 3.5–5.3)
PROT SERPL-MCNC: 6.5 G/DL (ref 6.4–8.4)
RBC # BLD AUTO: 4.97 MILLION/UL (ref 3.88–5.62)
SODIUM SERPL-SCNC: 138 MMOL/L (ref 135–147)
TRIGL SERPL-MCNC: 325 MG/DL (ref ?–150)
TSH SERPL DL<=0.05 MIU/L-ACNC: 4.43 UIU/ML (ref 0.45–4.5)
WBC # BLD AUTO: 6.53 THOUSAND/UL (ref 4.31–10.16)

## 2025-03-24 PROCEDURE — 80053 COMPREHEN METABOLIC PANEL: CPT

## 2025-03-24 PROCEDURE — 83721 ASSAY OF BLOOD LIPOPROTEIN: CPT

## 2025-03-24 PROCEDURE — 82306 VITAMIN D 25 HYDROXY: CPT

## 2025-03-24 PROCEDURE — 80061 LIPID PANEL: CPT

## 2025-03-24 PROCEDURE — 84443 ASSAY THYROID STIM HORMONE: CPT

## 2025-03-24 PROCEDURE — 83036 HEMOGLOBIN GLYCOSYLATED A1C: CPT

## 2025-03-24 PROCEDURE — 85025 COMPLETE CBC W/AUTO DIFF WBC: CPT

## 2025-03-24 PROCEDURE — 36415 COLL VENOUS BLD VENIPUNCTURE: CPT

## 2025-03-24 NOTE — RESULT ENCOUNTER NOTE
Unstable labs - will review with patient at upcoming appointment.    Hyperlipidemia - Recommend lifestyle modifications.  To consider Fenofibrate 160mg QD?      The 10-year ASCVD risk score (Dee KING, et al., 2019) is: 5.8%    Values used to calculate the score:      Age: 49 years      Sex: Male      Is Non- : No      Diabetic: No      Tobacco smoker: No      Systolic Blood Pressure: 134 mmHg      Is BP treated: Yes      HDL Cholesterol: 33 mg/dL      Total Cholesterol: 180 mg/dL

## 2025-03-25 ENCOUNTER — OFFICE VISIT (OUTPATIENT)
Dept: FAMILY MEDICINE CLINIC | Facility: CLINIC | Age: 50
End: 2025-03-25
Payer: COMMERCIAL

## 2025-03-25 VITALS
TEMPERATURE: 97.4 F | OXYGEN SATURATION: 98 % | WEIGHT: 188.8 LBS | DIASTOLIC BLOOD PRESSURE: 78 MMHG | BODY MASS INDEX: 27.03 KG/M2 | HEART RATE: 72 BPM | SYSTOLIC BLOOD PRESSURE: 110 MMHG | HEIGHT: 70 IN

## 2025-03-25 DIAGNOSIS — Z23 ENCOUNTER FOR IMMUNIZATION: ICD-10-CM

## 2025-03-25 DIAGNOSIS — N40.1 BENIGN PROSTATIC HYPERPLASIA WITH URINARY FREQUENCY: ICD-10-CM

## 2025-03-25 DIAGNOSIS — E55.9 VITAMIN D DEFICIENCY: ICD-10-CM

## 2025-03-25 DIAGNOSIS — F41.9 ANXIETY: ICD-10-CM

## 2025-03-25 DIAGNOSIS — E66.3 OVERWEIGHT: ICD-10-CM

## 2025-03-25 DIAGNOSIS — Q76.49 BERTOLOTTI'S SYNDROME: ICD-10-CM

## 2025-03-25 DIAGNOSIS — M79.18 MYOFASCIAL PAIN SYNDROME: ICD-10-CM

## 2025-03-25 DIAGNOSIS — R35.0 BENIGN PROSTATIC HYPERPLASIA WITH URINARY FREQUENCY: ICD-10-CM

## 2025-03-25 DIAGNOSIS — M47.816 LUMBAR SPONDYLOSIS: ICD-10-CM

## 2025-03-25 DIAGNOSIS — N52.8 OTHER MALE ERECTILE DYSFUNCTION: ICD-10-CM

## 2025-03-25 DIAGNOSIS — R79.89 LOW TESTOSTERONE: ICD-10-CM

## 2025-03-25 DIAGNOSIS — E78.5 HYPERLIPIDEMIA, UNSPECIFIED HYPERLIPIDEMIA TYPE: ICD-10-CM

## 2025-03-25 DIAGNOSIS — F32.0 CURRENT MILD EPISODE OF MAJOR DEPRESSIVE DISORDER, UNSPECIFIED WHETHER RECURRENT (HCC): ICD-10-CM

## 2025-03-25 DIAGNOSIS — Z00.00 ANNUAL PHYSICAL EXAM: Primary | ICD-10-CM

## 2025-03-25 DIAGNOSIS — N32.81 OAB (OVERACTIVE BLADDER): ICD-10-CM

## 2025-03-25 DIAGNOSIS — I10 PRIMARY HYPERTENSION: ICD-10-CM

## 2025-03-25 DIAGNOSIS — M53.3 COCCYDYNIA: ICD-10-CM

## 2025-03-25 DIAGNOSIS — R41.840 IMPAIRED CONCENTRATION: ICD-10-CM

## 2025-03-25 DIAGNOSIS — Z12.2 SCREENING FOR LUNG CANCER: ICD-10-CM

## 2025-03-25 PROCEDURE — 99396 PREV VISIT EST AGE 40-64: CPT | Performed by: FAMILY MEDICINE

## 2025-03-25 PROCEDURE — 99213 OFFICE O/P EST LOW 20 MIN: CPT | Performed by: FAMILY MEDICINE

## 2025-03-25 RX ORDER — FLUOXETINE HYDROCHLORIDE 40 MG/1
80 CAPSULE ORAL DAILY
Qty: 60 CAPSULE | Refills: 1 | Status: SHIPPED | OUTPATIENT
Start: 2025-03-25

## 2025-03-25 NOTE — ASSESSMENT & PLAN NOTE
Uncontrolled.  Management per Pain Management.  Patient seeking out of network Pain Management Consult at [facility].

## 2025-03-25 NOTE — ASSESSMENT & PLAN NOTE
Uncontrolled.  Management per Pain Management.  Patient seeking out of network Pain Management Consult at [facility].

## 2025-03-25 NOTE — ASSESSMENT & PLAN NOTE
Uncontrolled.  Increase Prozac 80mg QD.  Continue counseling.      Orders:  •  Ambulatory referral to Psych Services; Future  •  Comprehensive metabolic panel; Future  •  FLUoxetine (PROzac) 40 MG capsule; Take 2 capsules (80 mg total) by mouth daily  •  TSH, 3rd generation with Free T4 reflex; Future

## 2025-03-25 NOTE — ASSESSMENT & PLAN NOTE
Depression Screening Follow-up Plan: Patient's depression screening was positive with a PHQ-9 score of 6. Patient advised to follow-up with PCP for further management.    Uncontrolled.  Increase Prozac 80mg QD.  Continue counseling.      Orders:  •  Ambulatory referral to Psych Services; Future  •  Comprehensive metabolic panel; Future  •  FLUoxetine (PROzac) 40 MG capsule; Take 2 capsules (80 mg total) by mouth daily  •  TSH, 3rd generation with Free T4 reflex; Future

## 2025-03-25 NOTE — ASSESSMENT & PLAN NOTE
Uncontrolled.  Management per Pain Management.  Patient seeking out of network Pain Management Consult at [facility].

## 2025-03-25 NOTE — ASSESSMENT & PLAN NOTE
Stable on MVI and OTC Vitamin D 5,000IU daily.      Orders:  •  Comprehensive metabolic panel; Future  •  Vitamin D 25 hydroxy; Future

## 2025-03-25 NOTE — ASSESSMENT & PLAN NOTE
Uncontrolled.  Management per Pain Management.  Patient seeking out of network Pain Management Consult at [facility].

## 2025-03-25 NOTE — PATIENT INSTRUCTIONS
"Apps and Websites for Counseling, Anxiety/Depression, Chronic Pain, Lifestyle Change, Problem-solving, Self-Esteem, Anger Management, Coping with Uncertainty    (Prices current as of 9/5/19)    Mood Kit - Available in Apple Sarah Store for $4.99.  Supports a person's success in specific situations, includes thought , mood tracker, and journal.  Can be accessed in an unstructured way and used as an unguided self-help sarah.      2.  Moodnotes - Available in Apple Sarah Store for $4.99.  Focuses on healthier thinking habits and identifying \"traps\" in thinking.  Tracks mood over a period of time and identifies factors that influence mood.      3.  MoodMission - Available in Apple Sarah Store and Google Play for free.  Includes five \"missions\" to promote confidence in handling stressors and coping in specific situations.  The sarah personalizes its style and techniques based on when the user engages it most frequently.  In-sarah rewards are used to motivate, increase fun and self-confidence.  Helpful for patients who need improvement in mood or a decrease in anxiety and depression symptoms.      4.   What's Up - Available in Apple Sarah Store and Google Play for free.  Recognizes negative thinking patterns and methods to overcome them.  Uses helpful metaphors, a catastrophe scale, grounding techniques, and breathing exercises.  Has the ability to sync data across multiple devices and protect this information with a unique pass code.  Has the capability to be active in forums where people can discuss similar feelings and strategies that have been helpful.      5.  Moodpath - Available in Apple Sarah Store and Google Play for free.  Uses daily screenings to create a better understanding of thoughts, feelings, and emotions.  When needed, it provides a discussion guide to talking with a medical professional based on the responses to daily screenings.  Includes over 150 psychological exercises and videos to promote and strengthen " overall mental health.    6.  MindShift - Available in Apple Sarah Store and Google Play for free.  Helpful for youth and young adults in coping with anxiety.  Creates an individualized toolbox to help users deal with test anxiety, perfectionism, social anxiety, worry, panic, and conflict.  Includes directions on how to construct “belief experiments” to trial common beliefs that feed anxiety, guided relaxation, as well as tools and tips to help establish and accomplish goals.  Differentiates between helpful and unhelpful anxiety, and explains how to overcome fears by gradually facing them in manageable steps.    7.  CBT-I  - Available in Apple Sarah Store and Google Play for free.  For insomnia.  Educates about sleep, developing positive sleep routines, and improved sleep environment.  Encourages user to change behaviors which will provide confidence for better sleep on a regular basis.    8.  Silicon Kineticsco.uk - Free website.  Provides self-help and therapy resources that encourages change to combat negative and destructive thought patterns.  Includes access to numerous handouts on a variety of symptoms related to anxiety, depression, low self-esteem, panic attacks, social disorders, and more.  The solution section has interventions that can be printed and saved for future use.    9.  Woebot - Available in Apple Sarah store and Google Play for free.  Recommended for age 17+.  Friendly self-care  that helps you think through situations with step-by-step guidance using counseling tools, learn about yourself with intelligent mood tracking, and master skills to reduce stress and live happier through 100+ evidence-based stories from clinicians.  Chat as often or as little as you'd like, whenever you'd like to.      10.  Nabila:  AI  CBT DBT Chatbot - Available in Apple appsstore and Google Play for free, has in-sarah purchases.  Recommended for 12+.  Psychologist support at $30/month, 50% off to start.  AI  /  "chatbot is free.  Uses techniques of CBT, DBT, yoga, and meditation to support your needs regarding stress, anxiety, sleep, loss, and a full range of other mental health and wellness issues.      11.  Curable Pain Relief - Available in Apple Sarah store and Google Play for free, has in-sarah purchases.   Teaches about the chronic pain cycle and how to reverse it, while retraining your brain and nervous system for long-term results.  Smart  Brunilda guides user through updates in pain science to identify, target, eliminate the factors that are keeping user \"stuck\" in the pain cycle.      12.  Talkspace Online Therapy - Available in Apple Sarah store and Google Play for a fee.  Subscription service, starting at $59/week (billed monthly).  All plans include unlimited messaging, and add live video sessions if desired.  Unlimited messaging therapy for teens ages 13-17 and special services for couples therapy.  You can change therapists or stop subscription renewal at any time.  Recommended for 13+.  User is matched with a licensed therapist in your state and communicate on your device by text, audio, and video from anywhere, at any time.  User will hear back at least once a day, 5 days per week.      Mental Health Provider List - Group Practices   Check with your insurance for coverage.     [] [facility], [address]  [place], [place] [address] [phone]  Fax [phone]  - Free Walk In Assessment   Monday - Friday, 9 am to 5 om   Mental health screening for adults and children in crisis   You will meet with a master's level clinical to discuss urgent issues, complete a safety plan, and collaborate on appropriate resources in the surrounding area.   - Locations are:  [facility] [address], [address] [facility] [address], [place] [address]  [place], [address] [facility], [place] [address]  [facility], [address] [place] [address]  Mahnaz/Kulwinder Ding, [address], [place] [address]     [] [facility]   - [place]  [address] [facility] [place] [address]  [phone]  - [place]  [address] [id number] [phone]    [] [facility] - therapy and medication management services   [address] [facility]  [address]    [place] [address]  [phone]    [] [facility]   Main Office  [address] [facility]  [place] [address]  [phone]  [place]  [address], [address]  [place] [address]  [phone]  [place]  [address] [facility] [address]  [place] [address]  [phone]                [] [facility]   - [place] Office  [address]  [place], [place], [address]  ph: [phone]  - [place] Office  [address] [facility] [place] [address]  ph: [phone]  - [place] Office  [address] [facility]  [place] [address]  ph: [phone]  - [facility]  [address] [place] [address]  ph: [phone]  - [facility]  [address] [address]  [place] [address]  ph: [phone]    [] [facility] - children and adults, therapist and medication management   - [place]  [address] [facility]  [place] [address]  [phone]  - [place]  [address] [facility] [address], [address]  [place] [address]  [phone]  [address] [address]  [phone]    [] [facility]   - [facility]  [address] [facility] [place] [address]  Phone: [phone]  - [facility]  [address]  [place] [address]  Phone: [phone]  - [facility]  [address] [facility] [address]  Phone: [phone]    Outpatient Psychiatrists for Patients with Medicare & Private Insurance - [facility] & Surrounding Areas     Ban Monge &   Addi Braden) [address], [address] [place] [phone] adults [facility] and surrounding areas Med mgt & Therapy accepts Medicare and private insurance   Dr Frederic [facility] [place] [facility] [address] [place] [phone] adults [facility] and surrounding areas Medication Management accepts Medicare and private insurance   Dr. Marlee Chow [address] [facility] [phone] adults [facility] and surrounding areas Medication Management accepts Medicare and private insurance   Dr. David Behar (at [facility]) [address] [facility], [address] [place] [phone] adults [facility] and surrounding areas Med Mgt & Therapy no Medicare - must be in therapy at this office to also receive medication management - accepts private insurance   Dr. Christoph Mckeon [address] 4th Floor [place] [phone] adults [facility] and surrounding areas Medication Management accepts Medicare and private insurance   Dr. Damion Hickey [address] [facility] [phone] both [facility] and surrounding areas Medication Management no Medicare - accept private insurance   Dr. Juwan Yadav [address] [facility], [address] [place] [phone] adults [facility] and surrounding areas Medication Management accepts Medicare and private insurance   Dr. Elvia Hoffmann [address] [facility], [address] [place] [phone] adults [facility] and surrounding areas Med Mgt & Therapy accepts Medicare and private insurance   Dr. Elvia Hoffmann [address] [facility], [address] [place] [phone] adults & children [facility] and surrounding areas Med Mgt & Therapy accepts Medicare and private insurance   Dr. Zhang [address] [facility] [phone] ages 14 and older [facility] and surrounding areas Medication Management no Medicare - accept private insurance   [facility] [address] [facility] [phone] adults [facility]  "and [facility] Area Med mgt & Therapy Medicare and Private Insurance (Limited) (addiction, brain injury, intellectual disability services)  Dr. Ethan Jim [address] [place] [phone] Adults [facility] Medication Management Accepts Private Insurance   Dr. Jet Monge - [place] [facility] - adults and geriatrics,  [address] [place] [address] [phone]    [facility] [address] [place] [phone] both [facility] Med mgt & Therapy accepts medical assistance, Medicare and private insurance        Mental Health Clinics (**may take Medicaid)   Mental Health Clinic  [facility] - 1st floor, [address] [place] [phone]  [facility] -  [phone] [address] [place] [address] MH/MR [phone]  [address] [facility] [phone]  [facility] [phone]  [place] MH/MR [phone]  [facility]  [address] [place] [address]  [phone]  [facility]  [address] [place] [address] [phone]    Additional Counseling Resources:       - [facility] in [place]  Phone:  [phone]  Website:  [email or web address]     - [facility] in [place], [place]  Phone:  [phone]  Website:  [email or web address]    Dr. Frederic Turner- Psychiatry    Contact Information  Tel: [phone]  Fax: [phone]  Address: [address] [place] [address]  Website: [email or web address]      Patient Education     Routine physical for adults   The Basics   Written by the doctors and editors at UpToDate   What is a physical? -- A physical is a routine visit, or \"check-up,\" with your doctor. You might also hear it called a \"wellness visit\" or \"preventive " "visit.\"  During each visit, the doctor will:   Ask about your physical and mental health   Ask about your habits, behaviors, and lifestyle   Do an exam   Give you vaccines if needed   Talk to you about any medicines you take   Give advice about your health   Answer your questions  Getting regular check-ups is an important part of taking care of your health. It can help your doctor find and treat any problems you have. But it's also important for preventing health problems.  A routine physical is different from a \"sick visit.\" A sick visit is when you see a doctor because of a health concern or problem. Since physicals are scheduled ahead of time, you can think about what you want to ask the doctor.  How often should I get a physical? -- It depends on your age and health. In general, for people age 21 years and older:   If you are younger than 50 years, you might be able to get a physical every 3 years.   If you are 50 years or older, your doctor might recommend a physical every year.  If you have an ongoing health condition, like diabetes or high blood pressure, your doctor will probably want to see you more often.  What happens during a physical? -- In general, each visit will include:   Physical exam - The doctor or nurse will check your height, weight, heart rate, and blood pressure. They will also look at your eyes and ears. They will ask about how you are feeling and whether you have any symptoms that bother you.   Medicines - It's a good idea to bring a list of all the medicines you take to each doctor visit. Your doctor will talk to you about your medicines and answer any questions. Tell them if you are having any side effects that bother you. You should also tell them if you are having trouble paying for any of your medicines.   Habits and behaviors - This includes:   Your diet   Your exercise habits   Whether you smoke, drink alcohol, or use drugs   Whether you are sexually active   Whether you feel safe at " "home  Your doctor will talk to you about things you can do to improve your health and lower your risk of health problems. They will also offer help and support. For example, if you want to quit smoking, they can give you advice and might prescribe medicines. If you want to improve your diet or get more physical activity, they can help you with this, too.   Lab tests, if needed - The tests you get will depend on your age and situation. For example, your doctor might want to check your:   Cholesterol   Blood sugar   Iron level   Vaccines - The recommended vaccines will depend on your age, health, and what vaccines you already had. Vaccines are very important because they can prevent certain serious or deadly infections.   Discussion of screening - \"Screening\" means checking for diseases or other health problems before they cause symptoms. Your doctor can recommend screening based on your age, risk, and preferences. This might include tests to check for:   Cancer, such as breast, prostate, cervical, ovarian, colorectal, prostate, lung, or skin cancer   Sexually transmitted infections, such as chlamydia and gonorrhea   Mental health conditions like depression and anxiety  Your doctor will talk to you about the different types of screening tests. They can help you decide which screenings to have. They can also explain what the results might mean.   Answering questions - The physical is a good time to ask the doctor or nurse questions about your health. If needed, they can refer you to other doctors or specialists, too.  Adults older than 65 years often need other care, too. As you get older, your doctor will talk to you about:   How to prevent falling at home   Hearing or vision tests   Memory testing   How to take your medicines safely   Making sure that you have the help and support you need at home  All topics are updated as new evidence becomes available and our peer review process is complete.  This topic retrieved " from [employer] on: May 02, 2024.  Topic 618956 Version 1.0  Release: 32.4.3 - C32.122  © 2024 UpToDate, Inc. and/or its affiliates. All rights reserved.  Consumer Information Use and Disclaimer   Disclaimer: This generalized information is a limited summary of diagnosis, treatment, and/or medication information. It is not meant to be comprehensive and should be used as a tool to help the user understand and/or assess potential diagnostic and treatment options. It does NOT include all information about conditions, treatments, medications, side effects, or risks that may apply to a specific patient. It is not intended to be medical advice or a substitute for the medical advice, diagnosis, or treatment of a health care provider based on the health care provider's examination and assessment of a patient's specific and unique circumstances. Patients must speak with a health care provider for complete information about their health, medical questions, and treatment options, including any risks or benefits regarding use of medications. This information does not endorse any treatments or medications as safe, effective, or approved for treating a specific patient. UpToDate, Inc. and its affiliates disclaim any warranty or liability relating to this information or the use thereof.The use of this information is governed by the Terms of Use, available at https://www.woltersFaceFirst ([employer])uwer.com/en/know/clinical-effectiveness-terms. 2024© UpToDate, Inc. and its affiliates and/or licensors. All rights reserved.  Copyright   © 2024 UpToDate, Inc. and/or its affiliates. All rights reserved.    Patient Education     High cholesterol   The Basics   Written by the doctors and editors at [employer]   What is cholesterol? -- Cholesterol is a substance found in blood. Everyone has some. It is needed for good health. But people sometimes have too much cholesterol.  Compared with people with normal cholesterol, people with high cholesterol have a higher  "risk of heart attack, stroke, and other health problems. The higher your cholesterol, the higher your risk of these problems.  Are there different types of cholesterol? -- Yes, there are a few different types. If you get a cholesterol test, you might hear your doctor or nurse talk about:   Total cholesterol   LDL cholesterol - Some people call this the \"bad\" cholesterol. That's because having high LDL levels raises your risk of heart attack, stroke, and other health problems.   HDL cholesterol - Some people call this the \"good\" cholesterol. That's because people with high HDL levels tend to have a lower risk of heart attack, stroke, and other health problems.   Non-HDL cholesterol - Non-HDL cholesterol is your total cholesterol minus your HDL cholesterol.   Triglycerides - Triglycerides are not cholesterol. They are another type of fat. But they often get measured when cholesterol is measured. (Having high triglycerides also seems to increase the risk of heart attack and stroke.)  What should my numbers be? -- Ask your doctor or nurse what your numbers should be. Different people need different goals. If you live outside of the [place], see the table (table 1).  In general, people who do not already have heart disease should aim for:   Total cholesterol below 200   LDL cholesterol below 130, or much lower if they are at risk of heart attack or stroke   HDL cholesterol above 60   Non-HDL cholesterol below 160, or lower if they are at risk of heart attack or stroke   Triglycerides below 150  Remember, though, that many people who cannot meet these goals still have a low risk of heart attack and stroke.  What should I do if I have high cholesterol? -- Ask your doctor what your overall risk of heart attack and stroke is. Just having high cholesterol is not always a reason to worry. Having high cholesterol is just one of many things that can increase your risk of heart attack and stroke.  Other things that increase your risk " "include:   Smoking   High blood pressure   Having a parent or sibling who got heart disease at a young age - Young, in this case, means younger than 55 for males and younger than 65 for females.   A diet that is not heart healthy - A \"heart-healthy\" diet includes lots of fruits and vegetables, fiber, and healthy fats (like those found in fish, nuts, and certain oils). It also means limiting sugar and unhealthy fats.   Older age  If you are at high risk of heart attack and stroke, having high cholesterol is a problem. But if you are at low risk, high cholesterol might not need treatment.  Should I take medicine to lower cholesterol? -- Not everyone who has high cholesterol needs medicines. Your doctor or nurse will decide if you need them based on your age, family history, and other health concerns.  There are many different medicines used to lower cholesterol (table 2). Some help your body make less cholesterol. Some keep your body from absorbing cholesterol from foods. Some help your body get rid of cholesterol faster. The medicines most often used to treat high cholesterol are called \"statins.\"  You should probably take a statin if you:   Already had a heart attack or stroke   Have known heart disease   Have diabetes   Have a condition called \"peripheral artery disease,\" which makes it painful to walk, and happens when the arteries in your legs get clogged with fatty deposits   Have an \"abdominal aortic aneurysm,\" which is a widening of the main artery in the belly  Most people with any of the conditions listed above should take a statin no matter what their cholesterol level is. If your doctor or nurse prescribes a statin, it's important to keep taking it. The medicine might not make you feel any different. But it can help prevent heart attack, stroke, and death.  If your doctor or nurse recommends taking medicine to help lower your cholesterol, make sure that you know what it is called. Follow all the " "instructions for how to take it. For example, some medicines work better when you take them in the evening. Some need to be taken with food.  Tell your doctor or nurse if your medicine causes any side effects that bother you. They might be able to switch you to a different medicine.  Can I lower my cholesterol without medicines? -- Yes. You can help lower your cholesterol by doing these things:   You can lower your LDL, or \"bad,\" cholesterol by avoiding red meat, butter, fried foods, cheese, and other foods that have a lot of saturated fat.   You can lower triglycerides by avoiding sugary foods, fried foods, and excess alcohol.   If you have excess weight, it can help to lose weight. Your doctor or nurse can help you do this in a healthy way.   Try to get regular physical activity. Even gentle forms of exercise, like walking, are good for your health.  Even if these steps don't change your cholesterol very much, they can improve your health in many other ways.  All topics are updated as new evidence becomes available and our peer review process is complete.  This topic retrieved from [employer] on: Mar 01, 2024.  Topic 46473 Version 25.0  Release: 32.2.4 - C32.59  © 2024 UpToDate, Inc. and/or its affiliates. All rights reserved.  table 1: Cholesterol and triglyceride measurements in the [place] and elsewhere     Measurement used within the [place] Milligrams/deciliter (mg/dL)  Measurement used most places outside of the [place] Millimoles/liter (mmol/Liter)     Level to aim for  Level to aim for    Total cholesterol  Below 200 Below 5.17   LDL cholesterol  Below 130, or much lower if at risk of heart attack and stroke Below 3.36, or much lower if at risk of heart attack and stroke   HDL cholesterol  Above 60 Above 1.55   Triglycerides  Below 150 Below 1.7   Cholesterol is measured differently in the [place] than it is in most other countries. This table shows values used within and outside of the [place]. It includes the cholesterol and " triglyceride levels that most people who do not have heart disease should aim for.  Graphic 66240 Version 5.0  table 2: Lipid-lowering medicines  Generic name  Brand name    Statins    Atorvastatin Lipitor   Fluvastatin Lescol, Lescol XL   Lovastatin Mevacor, Altoprev   Pitavastatin Livalo   Pravastatin Pravachol   Rosuvastatin Crestor   Simvastatin Zocor   PCSK9 inhibitors    Alirocumab Praluent   Evolocumab Repatha, Repatha SureClick   Cholesterol absorption inhibitors    Ezetimibe Zetia   Bile acid sequestrants    Cholestyramine Prevalite, Questran, Questran Light   Colesevelam Welchol   Colestipol Colestid   Niacin (nicotinic acid)    Niacin immediate release     Niacin extended release Niaspan   Fibrates    Fenofibrate Fenoglide, Tricor, Triglide, others   Gemfibrozil Lopid   Brand names listed are for medicines available in the [place] and some other countries.  Graphic 28336 Version 7.0  Consumer Information Use and Disclaimer   Disclaimer: This generalized information is a limited summary of diagnosis, treatment, and/or medication information. It is not meant to be comprehensive and should be used as a tool to help the user understand and/or assess potential diagnostic and treatment options. It does NOT include all information about conditions, treatments, medications, side effects, or risks that may apply to a specific patient. It is not intended to be medical advice or a substitute for the medical advice, diagnosis, or treatment of a health care provider based on the health care provider's examination and assessment of a patient's specific and unique circumstances. Patients must speak with a health care provider for complete information about their health, medical questions, and treatment options, including any risks or benefits regarding use of medications. This information does not endorse any treatments or medications as safe, effective, or approved for treating a specific patient. [employer] and its  affiliates disclaim any warranty or liability relating to this information or the use thereof.The use of this information is governed by the Terms of Use, available at https://www.wolterskluwer.com/en/know/clinical-effectiveness-terms. 2024© [employer] and its affiliates and/or licensors. All rights reserved.  Copyright   © 2024 [employer] and/or its affiliates. All rights reserved.

## 2025-03-25 NOTE — PROGRESS NOTES
Assessment/Plan:  Assessment & Plan  Annual physical exam  Health Maintenance   Topic Date Due   • PT PLAN OF CARE  06/07/2023   • Lung Cancer Screening  Never done   • Zoster Vaccine (1 of 2) Never done   • Annual Physical  02/21/2025   • COVID-19 Vaccine (3 - 2024-25 season) 06/25/2025 (Originally 9/1/2024)   • Depression Screening  03/25/2026   • Depression Follow-up Plan  03/25/2026   • Colorectal Cancer Screening  11/12/2030   • DTaP,Tdap,and Td Vaccines (2 - Td or Tdap) 12/01/2030   • HIV Screening  Completed   • Hepatitis C Screening  Completed   • Influenza Vaccine  Completed   • Meningococcal B Vaccine  Aged Out   • RSV Vaccine age 0-20 Months  Aged Out   • Pneumococcal Vaccine: Pediatrics (0 to 5 Years) and At-Risk Patients (6 to 64 Years)  Aged Out   • HIB Vaccine  Aged Out   • IPV Vaccine  Aged Out   • Hepatitis A Vaccine  Aged Out   • Meningococcal ACWY Vaccine  Aged Out   • HPV Vaccine  Aged Out            Hyperlipidemia, unspecified hyperlipidemia type  Stable s statin. Recommend lifestyle modifications.     The 10-year ASCVD risk score (Dee KING, et al., 2019) is: 4.1%    Values used to calculate the score:      Age: 49 years      Sex: Male      Is Non- : No      Diabetic: No      Tobacco smoker: No      Systolic Blood Pressure: 110 mmHg      Is BP treated: Yes      HDL Cholesterol: 33 mg/dL      Total Cholesterol: 180 mg/dL      The 10-year ASCVD risk score (Dee KING, et al., 2019) is: 4.1%    Values used to calculate the score:      Age: 49 years      Sex: Male      Is Non- : No      Diabetic: No      Tobacco smoker: No      Systolic Blood Pressure: 110 mmHg      Is BP treated: Yes      HDL Cholesterol: 33 mg/dL      Total Cholesterol: 180 mg/dL    Orders:  •  Comprehensive metabolic panel; Future  •  Lipid panel; Future  •  TSH, 3rd generation with Free T4 reflex; Future  •  LDL cholesterol, direct; Future    Vitamin D deficiency  Stable on MVI and  OTC Vitamin D 5,000IU daily.      Orders:  •  Comprehensive metabolic panel; Future  •  Vitamin D 25 hydroxy; Future    Impaired concentration  Pending Psych consult and ADHD evaluation per counseling.  Increase Prozac 80mg QD.      Orders:  •  Ambulatory referral to Psych Services; Future    Current mild episode of major depressive disorder, unspecified whether recurrent (HCC)  Depression Screening Follow-up Plan: Patient's depression screening was positive with a PHQ-9 score of 6. Patient advised to follow-up with PCP for further management.    Uncontrolled.  Increase Prozac 80mg QD.  Continue counseling.      Orders:  •  Ambulatory referral to Psych Services; Future  •  Comprehensive metabolic panel; Future  •  FLUoxetine (PROzac) 40 MG capsule; Take 2 capsules (80 mg total) by mouth daily  •  TSH, 3rd generation with Free T4 reflex; Future    Anxiety  Uncontrolled.  Increase Prozac 80mg QD.  Continue counseling.      Orders:  •  Ambulatory referral to Psych Services; Future  •  Comprehensive metabolic panel; Future  •  FLUoxetine (PROzac) 40 MG capsule; Take 2 capsules (80 mg total) by mouth daily  •  TSH, 3rd generation with Free T4 reflex; Future    Screening for lung cancer  I discussed with him that he is a candidate for lung cancer CT screening.     The following Shared Decision-Making points were covered:  Benefits of screening were discussed, including the rates of reduction in death from lung cancer and other causes.  Harms of screening were reviewed, including false positive tests, radiation exposure levels, risks of invasive procedures, risks of complications of screening, and risk of overdiagnosis.  I counseled on the importance of adherence to annual lung cancer LDCT screening, impact of co-morbidities, and ability or willingness to undergo diagnosis and treatment.  I counseled on the importance of maintaining abstinence as a former smoker or was counseled on the importance of smoking cessation if a  current smoker    Review of Eligibility Criteria: He meets all of the criteria for Lung Cancer Screening.   He is 49 y.o.   He has 20 pack year tobacco history and is a current smoker or has quit within the past 15 years  He presents no signs or symptoms of lung cancer    After discussion, the patient decided to elect lung cancer screening.    Orders:  •  CT lung screening program; Future    BMI 27.0-27.9,adult  Stable.  Recommend lifestyle modifications.         Encounter for immunization    Orders:  •  Zoster Vaccine Recombinant IM; Future  •  Zoster Vaccine Recombinant IM; Future    Primary hypertension    Orders:  •  Comprehensive metabolic panel; Future    Overweight      Stable.  Recommend lifestyle modifications.         Other male erectile dysfunction  Management per Uro.  Continue Cialis 2.5mg QD.  Patient unable to afford Clomid initiation per Uro, which may help increase Testosterone levels.         Bertolotti's syndrome  Uncontrolled.  Management per Pain Management.  Patient seeking out of network Pain Management Consult at [facility].         Lumbar spondylosis  Uncontrolled.  Management per Pain Management.  Patient seeking out of network Pain Management Consult at [facility].         Myofascial pain syndrome  Uncontrolled.  Management per Pain Management.  Patient seeking out of network Pain Management Consult at [facility].         Coccydynia  Uncontrolled.  Management per Pain Management.  Patient seeking out of network Pain Management Consult at [facility].         Low testosterone  Management per Uro.  Resolved.  Unable to afford Clomid initiation per Uro, which may help increase Testosterone levels.           Benign prostatic hyperplasia with urinary frequency  Management per Uro.         OAB (overactive bladder)  Management per Uro.  On Flomax.               Return in about 6 weeks (around 5/6/2025) for 40min - F/U Anx/Dep,  ADHD?,Labs; 7/25/25 4mo - HTN, HL, Anx/Dep, LBP, ED, Labs;PRNShin#1;2moLShing#2.      Future Appointments   Date Time Provider Department Center   4/2/2025  9:20 AM FAM[facility]ELISA NURSE  [facility]   5/9/2025  8:40 AM Lizzie Navarrete DO  [facility]   6/11/2025  9:00 AM FAMILY MED MELISA NURSE  [facility]   7/28/2025  9:30 AM Lizzie Navarrete DO  [facility]        Subjective:     Rojelio is a 49 y.o. male who presents today for a follow-up on his chronic medical conditions.        HPI:  Chief Complaint   Patient presents with   • Physical Exam     Would like ref to urologist and would like adhd testing.      -- Above per clinical staff and reviewed. --      HPI      Today:      Return in about 4 months (around 6/21/2024) for 4mo - HTN, HL, Anx/Dep, LBP, ED, Labs; 4/9/24 - Pre-Op L Bunionectomy 5/8/24.       4mo OV - Overdue     B/L Knee Pain - Management per Ortho Dr. Royal - next appt PRN.  Attending PT.  Symptoms x intermittently x 1 year.  Under the knee cap, intermittent pain, worse c traveling on stairs.  No pain c walking or running.  Denies locking, popping, or giving way.  Denies trauma.      Left Medial Foot Pain - Management per Podiatry Dr. Crowell - next appt 5/24.  Pending Left Bunionectomy 5/8/24.  Symptoms x couple months.  Concerned about bunion.  Got new fitted running shoes s benefit.  Has pain at left bunion c running.     Left Achilles Pain -  Management per Podiatry Dr. Crowell - next appt 11/23.  Symptoms x 4  Occurs c running.  Using ice and rest c benefit.      Overweight -s/p consult c Exercise Nutritionist Lilly Mcdaniel - Last seen 2024.  Trying to watch diet.  He is drinking less soda and less fast food.  Not counting calories with lose it sheri.  Drinking 2 small sodas daily.  Exercise - previously Running for 1 hour, 2-3 times per week, previously Jujitsu for 1 hour, 2-3 times per week - limited due to back pain,  Not Weightlifting for 1 hour, 2-3  times per week.       HTN - On Losartan 100mg QD.  No other HTN meds previously.  No BP check outside of office on arm cuff.     Hyperlipidemia - No statin previously.     H/O Grave's Disease / Hyperthyroidism - s/p Tapazole x 2 years, then radioactive iodine, which resulted in euthyroid status.       Anxiety / Depression - He is concerned about ADHD.  He has problems focusing and did so in school as a child, had average grades.  Held back in elementary school due to losing focus and daydreaming.  Needs repetition to learn.  He states his job performance is ok.  He has difficulty reading or listening to podcasts.  Wife states patient zones out at home.  He loses things at home.  Mood has suzanne decreased.  On increased Prozac 40mg QD. D/C increased Effexor XR 225mg QD due to nightmares and inefficacy.  D/C Wellbutrin XL 300mg QD in AM and Cymbalta 90mg QD.   Previous Management per Psych at [facility] - last seen   - F/U PRN as was D/C to PCP Management.  Previously on Zoloft, Prozac - did fine with these meds, Lexapro 20mg QD ineffective.  No other mood meds or higher dose previously.  Sees counselor q2-4 weeks  - (no longer attending marriage counseling with wife), and individual - helpful.  Next appt .  Poor social supports.  No SI/HI/AH/VH.          PHQ-2/9 Depression Screening    Little interest or pleasure in doing things: 1 - several days  Feeling down, depressed, or hopeless: 1 - several days  Trouble falling or staying asleep, or sleeping too much: 1 - several days  Feeling tired or having little energy: 1 - several days  Poor appetite or overeatin - not at all  Feeling bad about yourself - or that you are a failure or have let yourself or your family down: 1 - several days  Trouble concentrating on things, such as reading the newspaper or watching television: 0 - not at all  Moving or speaking so slowly that other people could have noticed. Or the opposite - being so fidgety or restless that you  have been moving around a lot more than usual: 1 - several days  Thoughts that you would be better off dead, or of hurting yourself in some way: 0 - not at all  PHQ-9 Score: 6  PHQ-9 Interpretation: Mild depression              Chronic Lower Back Pain / Lumbar Spondylosis / Myofascial Pain Syndrome of Lumbar Spine / Bertolotti's Syndrome / Coccydynia  - Management per pain Management Dr. Ch.  Next appt 7/24 - Overdue.  Patient was advised to F/U [facility], but  Is no longer accepting new patients.  s/p Flouro Guided TPI at the [place] 1/10/24 s benefit, s/p B/L Piriformis infection 10//23 s benefit.  S/p B/L SI joint injection 9/6/23 - worse symptoms today.  s/p 6/5/23 for ALEC s benefit.  Management per Ortho Spine Dr. Humphrey - next appt PRN.  Has daily spasms on medial sacrum, radiates into B/L buttocks.  Injured his back in 2015 during  training in [place].  He is using Flexeril 10mg 1/2 -1 daily PRN - needing to use 1/2 pill daily as spasms are worsening, taking 2-3 times per week when not working.   Previously seen [facility] 2021.  S/p ALEC s benefit.  Last MRI in [place] in [id number].  Surgery was not advised per advised.  Last PT in [place] 2018 s benefit.  He is attending PT 2 days per week and doing HEP, but causes pain.  Last saw Chiro 4/24 - Next appt 5/24 - Overdue.     Vitamin D Deficiency - No Drisdol.  Taking MVI and OTC Vitamin D 5,000IU daily.      ED - Management per Uro Dr. Armenta.  Next appt 1/25 - Overdue.  On Cialis 2.5mg QD - helpful.  Patient feels due to Lexapro and Wellbutrin.  D/C Viagra 50mg QD PRN - helpful, but desired daily Rx.  No other ED meds or higher doses previously.      H/O Low Testosterone - Management per Uro Dr. Armenta.  Next appt 1/25.  Patient cannot afford Clomid.     BPH / OAB - Management per Uro Dr. Armenta.  Next appt 1/25 - Overdue.  D/C Flomax 0.4mg QD, Ditropan XL 10mg QD as ineffective.     H/O False Positive HIV test - s/p LVPG  e-consult per Dr. Mayen 10/7/22, who advised ID F/U vs. PCP repeat the testing at three months, six months and one year (completed 10/12/23), and include HIV PCR testing with the routine screening if it does not already reflex that way.      Normal colonoscopy 11/12/20 by GI Dr. Michelle @ [facility].  Repeat in 10 years.          Reviewed:  Labs 3/24/25, Pain Management 5/7/24, Nutrition 5/3/23, Uro 7/31/24, Ortho 9/11/23, Podiatry 2/12/24, Chiro 4/12/24     Overdue for Dentist.  Sees Optho q2 years.       The following portions of the patient's history were reviewed and updated as appropriate: allergies, current medications, past family history, past medical history, past social history, past surgical history and problem list.      Review of Systems   Constitutional:  Negative for appetite change, chills, diaphoresis, fatigue and fever.   Respiratory:  Negative for chest tightness and shortness of breath.    Cardiovascular:  Negative for chest pain.   Gastrointestinal:  Negative for abdominal pain, blood in stool, diarrhea, nausea and vomiting.   Genitourinary:  Positive for frequency (Chronic). Negative for dysuria.   Musculoskeletal:  Positive for back pain.        Current Outpatient Medications   Medication Sig Dispense Refill   • Boswellia-Glucosamine-Vit D (Osteo Bi-Flex One Per Day) TABS Take 2 tablets by mouth in the morning     • Cholecalciferol (Vitamin D3) 125 MCG (5000 UT) TABS Take 5,000 Units by mouth daily     • [employer] FIBER GUMMIES PO Take 2 tablets by mouth in the morning     • cyclobenzaprine (FLEXERIL) 10 mg tablet Take 1 tablet (10 mg total) by mouth daily as needed for muscle spasms 90 tablet 1   • FLUoxetine (PROzac) 40 MG capsule Take 2 capsules (80 mg total) by mouth daily 60 capsule 1   • losartan (COZAAR) 100 MG tablet Take 1 tablet (100 mg total) by mouth daily 90 tablet 1   • Multiple Vitamin (multivitamin) tablet Take 1 tablet by mouth daily     • tadalafil (CIALIS) 2.5 MG tablet Take 1 tablet  "(2.5 mg total) by mouth daily Rx per Uro 100 tablet 1     No current facility-administered medications for this visit.       Objective:  /78 (Patient Position: Sitting, Cuff Size: Standard)   Pulse 72   Temp (!) 97.4 °F (36.3 °C) (Temporal)   Ht 5' 10\" (1.778 m)   Wt 85.6 kg (188 lb 12.8 oz)   SpO2 98%   BMI 27.09 kg/m²    Wt Readings from Last 3 Encounters:   03/25/25 85.6 kg (188 lb 12.8 oz)   11/20/24 84.8 kg (187 lb)   07/31/24 88 kg (194 lb)      BP Readings from Last 3 Encounters:   03/25/25 110/78   11/20/24 134/80   07/31/24 132/78          Physical Exam  Vitals and nursing note reviewed.   Constitutional:       General: He is not in acute distress.     Appearance: Normal appearance. He is well-developed. He is not ill-appearing or toxic-appearing.   HENT:      Head: Normocephalic and atraumatic.      Right Ear: Tympanic membrane, ear canal and external ear normal.      Left Ear: Tympanic membrane, ear canal and external ear normal.      Nose: Nose normal.      Right Sinus: No maxillary sinus tenderness or frontal sinus tenderness.      Left Sinus: No maxillary sinus tenderness or frontal sinus tenderness.      Mouth/Throat:      Mouth: Mucous membranes are moist.      Pharynx: Uvula midline.      Tonsils: No tonsillar exudate.   Eyes:      Extraocular Movements: Extraocular movements intact.      Conjunctiva/sclera: Conjunctivae normal.      Pupils: Pupils are equal, round, and reactive to light.   Cardiovascular:      Rate and Rhythm: Normal rate and regular rhythm.      Pulses: Normal pulses.      Heart sounds: Normal heart sounds.   Pulmonary:      Effort: Pulmonary effort is normal.      Breath sounds: Normal breath sounds.   Abdominal:      General: Abdomen is flat. Bowel sounds are normal. There is no distension.      Palpations: Abdomen is soft. There is no mass.      Tenderness: There is no abdominal tenderness. There is no guarding or rebound.   Musculoskeletal:         General: No " "swelling or tenderness.      Cervical back: Neck supple.      Right lower leg: No edema.      Left lower leg: No edema.   Lymphadenopathy:      Cervical: No cervical adenopathy.   Skin:     Findings: No rash.   Neurological:      General: No focal deficit present.      Mental Status: He is alert and oriented to person, place, and time.   Psychiatric:         Attention and Perception: Attention and perception normal.         Mood and Affect: Mood is depressed. Affect is flat.         Speech: Speech normal.         Behavior: Behavior normal.         Thought Content: Thought content normal.         Cognition and Memory: Cognition and memory normal.         Judgment: Judgment normal.         Lab Results:      Lab Results   Component Value Date    WBC 6.53 03/24/2025    HGB 15.8 03/24/2025    HCT 48.1 03/24/2025     03/24/2025    TRIG 325 (H) 03/24/2025    HDL 33 (L) 03/24/2025    LDLDIRECT 102 (H) 03/24/2025    ALT 16 03/24/2025    AST 17 03/24/2025    K 4.1 03/24/2025     03/24/2025    CREATININE 1.14 03/24/2025    BUN 20 03/24/2025    CO2 28 03/24/2025    TSH 2.35 09/28/2022    PSA 0.561 06/17/2024    GLUF 92 03/24/2025    HGBA1C 5.5 03/24/2025     No results found for: \"URICACID\"  Invalid input(s): \"BASENAME\" Vitamin D    No results found.     POCT Labs                     I discussed with him that he is a candidate for lung cancer CT screening.     The following Shared Decision-Making points were covered:  Benefits of screening were discussed, including the rates of reduction in death from lung cancer and other causes.  Harms of screening were reviewed, including false positive tests, radiation exposure levels, risks of invasive procedures, risks of complications of screening, and risk of overdiagnosis.  I counseled on the importance of adherence to annual lung cancer LDCT screening, impact of co-morbidities, and ability or willingness to undergo diagnosis and treatment.  I counseled on the importance " of maintaining abstinence as a former smoker or was counseled on the importance of smoking cessation if a current smoker    Review of Eligibility Criteria: He meets all of the criteria for Lung Cancer Screening.   He is 49 y.o.   He has 20 pack year tobacco history and is a current smoker or has quit within the past 15 years  He presents no signs or symptoms of lung cancer    After discussion, the patient decided to elect lung cancer screening.  Answers submitted by the patient for this visit:  Annual Physical (Submitted on 3/20/2025)  Diet/Nutrition choices: limited junk food  Exercise choices: strength training exercises, 1-2 times a week on average  Sleep choices: 4-6 hours of sleep on average  Hearing choices: normal hearing right ear, normal hearing left ear  Vision choices: wears glasses and contacts  Dental choices: brushes teeth twice daily, floss regularly  Do you currently have an OB/GYN provider that you routinely follow with?: No  Any history of sexual transmitted disease/infection?: No  Urinary symptoms: urinary frequency, incomplete bladder emptying  Do you have an advance directive/living will?: No  Do you have a durable power of  (POA)?: No

## 2025-03-25 NOTE — ASSESSMENT & PLAN NOTE
Stable s statin. Recommend lifestyle modifications.     The 10-year ASCVD risk score (Dee KING, et al., 2019) is: 4.1%    Values used to calculate the score:      Age: 49 years      Sex: Male      Is Non- : No      Diabetic: No      Tobacco smoker: No      Systolic Blood Pressure: 110 mmHg      Is BP treated: Yes      HDL Cholesterol: 33 mg/dL      Total Cholesterol: 180 mg/dL      The 10-year ASCVD risk score (Dee KING, et al., 2019) is: 4.1%    Values used to calculate the score:      Age: 49 years      Sex: Male      Is Non- : No      Diabetic: No      Tobacco smoker: No      Systolic Blood Pressure: 110 mmHg      Is BP treated: Yes      HDL Cholesterol: 33 mg/dL      Total Cholesterol: 180 mg/dL    Orders:  •  Comprehensive metabolic panel; Future  •  Lipid panel; Future  •  TSH, 3rd generation with Free T4 reflex; Future  •  LDL cholesterol, direct; Future

## 2025-04-02 ENCOUNTER — CLINICAL SUPPORT (OUTPATIENT)
Dept: FAMILY MEDICINE CLINIC | Facility: CLINIC | Age: 50
End: 2025-04-02
Payer: COMMERCIAL

## 2025-04-02 DIAGNOSIS — Z23 ENCOUNTER FOR IMMUNIZATION: ICD-10-CM

## 2025-04-02 PROCEDURE — 90471 IMMUNIZATION ADMIN: CPT

## 2025-04-02 PROCEDURE — 90750 HZV VACC RECOMBINANT IM: CPT

## 2025-04-08 NOTE — PROGRESS NOTES
Name: Rojelio Lorenzo      : 1975      MRN: [id number]  Encounter Provider: CUATE Car  Encounter Date: 2025   Encounter department: [facility] UROLOGY [place]  :  Assessment & Plan  Benign prostatic hyperplasia with urinary frequency  AUA: 30  PVR: 15ml  Patient has tried tamsulosin in the past and state he had no relief.  Will trial alfuzosin and follow-up in 3 months.  Orders:    POCT urine dip auto non-scope    POCT Measure PVR    alfuzosin (UROXATRAL) 10 mg 24 hr tablet; Take 1 tablet (10 mg total) by mouth daily    OAB (overactive bladder)  PVR: 15ml  Will trial oxybutnin 10 mg  Encouraged to increase hydration and avoid bladder iritants.  Patient reports to drink 1-2 Coca-Cola's a day and sparkling water.  Follow-up in 3 months  Orders:    POCT urine dip auto non-scope    POCT Measure PVR    oxybutynin (DITROPAN-XL) 10 MG 24 hr tablet; Take 1 tablet (10 mg total) by mouth daily at bedtime    Screening for prostate cancer  0.56 (2024), 0.64 (2023)  Reorder PSA to get done in   SELINA: Benign  Denies family history of prostate cancer.  Orders:    PSA, Total Screen; Future        History of Present Illness   Rojelio Lorenzo is a 49 y.o. male who presents for follow-up for overactive bladder.  Patient last seen in office 2024 patient was on Flomax 0.4 mg for 6 months without relief.  Is was recommended that time to trial oxybutynin 10 mg    Patient reports in the last year he has been struggling with urinary urgency, frequency, nocturia x 2.  Denies hematuria, dysuria.    Patient reports drinking sparkling water about 2 to 3 cups a day, coffee cup a day, soda about 1-2 20 A day.  Patient works as a paramedic and reports that he frequently has the urge to urinate.    Patient reports a slower stream and feels like it takes him a while to initiate urination.  Review of Systems   Genitourinary:  Positive for frequency and urgency. Negative for difficulty urinating and  "dysuria.          Objective   /72 (BP Location: Left arm, Patient Position: Sitting, Cuff Size: Adult)   Pulse 69   Ht 5' 10\" (1.778 m)   Wt 84.7 kg (186 lb 12.8 oz)   SpO2 95%   BMI 26.80 kg/m²     Physical Exam  Vitals reviewed.   Constitutional:       Appearance: Normal appearance.   Cardiovascular:      Rate and Rhythm: Normal rate.   Genitourinary:     Comments: Digital rectal exam smooth prostate, without appreciable nodule, induration or asymmetry      Skin:     General: Skin is warm.   Neurological:      General: No focal deficit present.      Mental Status: He is oriented to person, place, and time.   Psychiatric:         Mood and Affect: Mood normal.         Behavior: Behavior normal.           Results   Lab Results   Component Value Date    PSA 0.561 06/17/2024    PSA 0.64 05/26/2023     Lab Results   Component Value Date    CALCIUM 9.6 03/24/2025    K 4.1 03/24/2025    CO2 28 03/24/2025     03/24/2025    BUN 20 03/24/2025    CREATININE 1.14 03/24/2025     Lab Results   Component Value Date    WBC 6.53 03/24/2025    HGB 15.8 03/24/2025    HCT 48.1 03/24/2025    MCV 97 03/24/2025     03/24/2025       Office Urine Dip  Recent Results (from the past hour)   POCT Measure PVR    Collection Time: 04/11/25  2:22 PM   Result Value Ref Range    POST-VOID RESIDUAL VOLUME, ML POC 15 mL   POCT urine dip auto non-scope    Collection Time: 04/11/25  2:25 PM   Result Value Ref Range     COLOR,UA Yellow     CLARITY,UA Clear     SPECIFIC GRAVITY,UA 1.020      PH,UA 7     LEUKOCYTE ESTERASE,UA Neg     NITRITE,UA Neg     GLUCOSE, UA Neg     KETONES,UA Neg     BILIRUBIN,UA Neg     BLOOD,UA Neg     POCT URINE PROTEIN Neg     SL AMB POCT UROBILINOGEN 2.0          "

## 2025-04-09 ENCOUNTER — TELEPHONE (OUTPATIENT)
Age: 50
End: 2025-04-09

## 2025-04-11 ENCOUNTER — OFFICE VISIT (OUTPATIENT)
Dept: UROLOGY | Facility: MEDICAL CENTER | Age: 50
End: 2025-04-11
Payer: COMMERCIAL

## 2025-04-11 VITALS
OXYGEN SATURATION: 95 % | HEIGHT: 70 IN | BODY MASS INDEX: 26.74 KG/M2 | WEIGHT: 186.8 LBS | SYSTOLIC BLOOD PRESSURE: 120 MMHG | DIASTOLIC BLOOD PRESSURE: 72 MMHG | HEART RATE: 69 BPM

## 2025-04-11 DIAGNOSIS — Z12.5 SCREENING FOR PROSTATE CANCER: ICD-10-CM

## 2025-04-11 DIAGNOSIS — N32.81 OAB (OVERACTIVE BLADDER): ICD-10-CM

## 2025-04-11 DIAGNOSIS — R35.0 BENIGN PROSTATIC HYPERPLASIA WITH URINARY FREQUENCY: Primary | ICD-10-CM

## 2025-04-11 DIAGNOSIS — N40.1 BENIGN PROSTATIC HYPERPLASIA WITH URINARY FREQUENCY: Primary | ICD-10-CM

## 2025-04-11 LAB
POST-VOID RESIDUAL VOLUME, ML POC: 15 ML
SL AMB  POCT GLUCOSE, UA: ABNORMAL
SL AMB LEUKOCYTE ESTERASE,UA: ABNORMAL
SL AMB POCT BILIRUBIN,UA: ABNORMAL
SL AMB POCT BLOOD,UA: ABNORMAL
SL AMB POCT CLARITY,UA: CLEAR
SL AMB POCT COLOR,UA: YELLOW
SL AMB POCT KETONES,UA: ABNORMAL
SL AMB POCT NITRITE,UA: ABNORMAL
SL AMB POCT PH,UA: 7
SL AMB POCT SPECIFIC GRAVITY,UA: 1.02
SL AMB POCT URINE PROTEIN: ABNORMAL
SL AMB POCT UROBILINOGEN: 2

## 2025-04-11 PROCEDURE — 51798 US URINE CAPACITY MEASURE: CPT

## 2025-04-11 PROCEDURE — 99213 OFFICE O/P EST LOW 20 MIN: CPT

## 2025-04-11 PROCEDURE — 81003 URINALYSIS AUTO W/O SCOPE: CPT

## 2025-04-11 RX ORDER — ALFUZOSIN HYDROCHLORIDE 10 MG/1
10 TABLET, EXTENDED RELEASE ORAL DAILY
Qty: 90 TABLET | Refills: 0 | Status: SHIPPED | OUTPATIENT
Start: 2025-04-11

## 2025-04-11 RX ORDER — OXYBUTYNIN CHLORIDE 10 MG/1
10 TABLET, EXTENDED RELEASE ORAL
Qty: 90 TABLET | Refills: 0 | Status: SHIPPED | OUTPATIENT
Start: 2025-04-11

## 2025-04-11 NOTE — ASSESSMENT & PLAN NOTE
AUA: 30  PVR: 15ml  Patient has tried tamsulosin in the past and state he had no relief.  Will trial alfuzosin and follow-up in 3 months.  Orders:    POCT urine dip auto non-scope    POCT Measure PVR    alfuzosin (UROXATRAL) 10 mg 24 hr tablet; Take 1 tablet (10 mg total) by mouth daily

## 2025-04-11 NOTE — ASSESSMENT & PLAN NOTE
PVR: 15ml  Will trial oxybutnin 10 mg  Encouraged to increase hydration and avoid bladder iritants.  Patient reports to drink 1-2 Coca-Cola's a day and sparkling water.  Follow-up in 3 months  Orders:    POCT urine dip auto non-scope    POCT Measure PVR    oxybutynin (DITROPAN-XL) 10 MG 24 hr tablet; Take 1 tablet (10 mg total) by mouth daily at bedtime

## 2025-04-22 ENCOUNTER — HOSPITAL ENCOUNTER (OUTPATIENT)
Dept: CT IMAGING | Facility: HOSPITAL | Age: 50
Discharge: HOME | End: 2025-04-22

## 2025-04-22 DIAGNOSIS — Z12.2 SCREENING FOR LUNG CANCER: ICD-10-CM

## 2025-04-27 DIAGNOSIS — F41.9 ANXIETY: ICD-10-CM

## 2025-04-27 DIAGNOSIS — F32.0 CURRENT MILD EPISODE OF MAJOR DEPRESSIVE DISORDER, UNSPECIFIED WHETHER RECURRENT (HCC): ICD-10-CM

## 2025-04-28 ENCOUNTER — RESULTS FOLLOW-UP (OUTPATIENT)
Dept: FAMILY MEDICINE CLINIC | Facility: CLINIC | Age: 50
End: 2025-04-28

## 2025-04-28 DIAGNOSIS — Z12.2 SCREENING FOR LUNG CANCER: Primary | ICD-10-CM

## 2025-04-28 RX ORDER — FLUOXETINE HYDROCHLORIDE 40 MG/1
80 CAPSULE ORAL DAILY
Qty: 60 CAPSULE | Refills: 5 | Status: SHIPPED | OUTPATIENT
Start: 2025-04-28

## 2025-04-29 NOTE — RESULT ENCOUNTER NOTE
CT lung cancer screen without IV contrast shows no nodules and/or definitely benign nodules.  Nurse to see orders to repeat annual CT lung cancer screening in 12 months.    Message sent to patient via [employer] patient portal.    Nurse to also call patient.

## 2025-05-22 ENCOUNTER — RA CDI HCC (OUTPATIENT)
Dept: OTHER | Facility: HOSPITAL | Age: 50
End: 2025-05-22

## 2025-05-25 DIAGNOSIS — I10 PRIMARY HYPERTENSION: ICD-10-CM

## 2025-05-25 RX ORDER — LOSARTAN POTASSIUM 100 MG/1
100 TABLET ORAL DAILY
Qty: 90 TABLET | Refills: 1 | Status: SHIPPED | OUTPATIENT
Start: 2025-05-25

## 2025-05-29 ENCOUNTER — OFFICE VISIT (OUTPATIENT)
Dept: FAMILY MEDICINE CLINIC | Facility: CLINIC | Age: 50
End: 2025-05-29
Payer: COMMERCIAL

## 2025-05-29 VITALS
SYSTOLIC BLOOD PRESSURE: 102 MMHG | HEIGHT: 70 IN | WEIGHT: 186 LBS | DIASTOLIC BLOOD PRESSURE: 76 MMHG | HEART RATE: 76 BPM | OXYGEN SATURATION: 97 % | TEMPERATURE: 97.8 F | BODY MASS INDEX: 26.63 KG/M2

## 2025-05-29 DIAGNOSIS — I10 PRIMARY HYPERTENSION: Primary | ICD-10-CM

## 2025-05-29 DIAGNOSIS — F32.0 CURRENT MILD EPISODE OF MAJOR DEPRESSIVE DISORDER, UNSPECIFIED WHETHER RECURRENT (HCC): ICD-10-CM

## 2025-05-29 DIAGNOSIS — F41.9 ANXIETY: ICD-10-CM

## 2025-05-29 DIAGNOSIS — E66.3 OVERWEIGHT: ICD-10-CM

## 2025-05-29 PROCEDURE — 99214 OFFICE O/P EST MOD 30 MIN: CPT | Performed by: FAMILY MEDICINE

## 2025-05-29 NOTE — PROGRESS NOTES
Assessment/Plan:  Assessment & Plan  Primary hypertension  Stable.  Check blood pressure outside of office.  Recommend lifestyle modifications.            Anxiety  Stable.  Continue Prozac 80mg QD and counseling.              Current mild episode of major depressive disorder, unspecified whether recurrent (HCC)  Depression Screening Follow-up Plan: Patient's depression screening was positive with a PHQ-9 score of 6. Patient advised to follow-up with PCP for further management.    Stable.  Continue Prozac 80mg QD and counseling.                Overweight      Stable.  Recommend lifestyle modifications.                  Return if symptoms worsen or fail to improve.      Future Appointments   Date Time Provider Department Center   6/11/2025  9:00 AM [facility] NURSE [facility]   7/14/2025  3:00 PM CUATE Car [facility]   7/28/2025  9:30 AM Lizzie Navarrete DO [facility]        Subjective:     Rojelio is a 49 y.o. male who presents today for a follow-up on his chronic medical conditions.        HPI:  Chief Complaint   Patient presents with    Follow-up     Pt wants 2nd shingles shot today     -- Above per clinical staff and reviewed. --      HPI      Today:    Return in about 6 weeks (around 5/6/2025) for 40min - F/U Anx/Dep, ADHD?,Labs; 7/25/25 4mo - HTN, HL, Anx/Dep, LBP, ED, Labs;PRNShin#1;2moLShing#2.     Return in about 6 weeks (around 5/6/2025) for 40min - F/U Anx/Dep, ADHD?,Labs;     Did not complete labs 3/25/25.       Overweight -s/p consult c Exercise Nutritionist Lilly Mcdaniel - Last seen 2024.  Watching diet.  He is drinking less soda and less fast food.  Not counting calories with lose it sheri.  Drinking 2 small sodas daily.  Exercise - previously Running for 1 hour, 2-3 times per week, Jujitsu for 1 hour, 2-3 times per week - limited due to back pain,  Weightlifting for 45 minutes, 2-3 times per week.       HTN - On Losartan 100mg QD.  No other HTN meds  previously.  BP check outside of office on arm cuff 128/70.      Anxiety / Depression - He is concerned about ADHD.  He has problems focusing and did so in school as a child, had average grades.  Held back in elementary school due to losing focus and daydreaming.  Needs repetition to learn.  He states his job performance is ok.  He has difficulty reading or listening to podcasts.  Wife states patient zones out at home.  He loses things at home.  On increased Prozac 80mg QD x 6 weeks.  Mood is 40-50% improved.  He is more motivated.  D/C increased Effexor XR 225mg QD due to nightmares and inefficacy.  D/C Wellbutrin XL 300mg QD in AM and Cymbalta 90mg QD.   Previous Management per Psych at [facility] - last seen   - F/U PRN as was D/C to PCP Management.  Previously on Zoloft, Prozac - did fine with these meds, Lexapro 20mg QD ineffective.  No other mood meds or higher dose previously.  Sees counselor q2 weeks  - (no longer attending marriage counseling with wife), and individual - helpful.  Next appt .  Poor social supports.  No SI/HI/AH/VH.          PHQ-2/9 Depression Screening       Little interest or pleasure in doing things: 1 - several days  Feeling down, depressed, or hopeless: 1 - several days  Trouble falling or staying asleep, or sleeping too much: 1 - several days  Feeling tired or having little energy: 1 - several days  Poor appetite or overeatin - not at all  Feeling bad about yourself - or that you are a failure or have let yourself or your family down: 1 - several days  Trouble concentrating on things, such as reading the newspaper or watching television: 0 - not at all  Moving or speaking so slowly that other people could have noticed. Or the opposite - being so fidgety or restless that you have been moving around a lot more than usual: 1 - several days  Thoughts that you would be better off dead, or of hurting yourself in some way: 0 - not at all  PHQ-9 Score: 6  PHQ-9 Interpretation: Mild  depression                        From previous note:    Return in about 4 months (around 6/21/2024) for 4mo - HTN, HL, Anx/Dep, LBP, ED, Labs; 4/9/24 - Pre-Op L Bunionectomy 5/8/24.         4mo OV - Overdue     B/L Knee Pain - Management per Ortho Dr. Royal - next appt PRN.  Attending PT.  Symptoms x intermittently x 1 year.  Under the knee cap, intermittent pain, worse c traveling on stairs.  No pain c walking or running.  Denies locking, popping, or giving way.  Denies trauma.      Left Medial Foot Pain - Management per Podiatry Dr. Crowell - next appt 5/24.  Pending Left Bunionectomy 5/8/24.  Symptoms x couple months.  Concerned about bunion.  Got new fitted running shoes s benefit.  Has pain at left bunion c running.     Left Achilles Pain -  Management per Podiatry Dr. Crowell - next appt 11/23.  Symptoms x 4  Occurs c running.  Using ice and rest c benefit.      Overweight -s/p consult c Exercise Nutritionist Lilly Mcdaniel - Last seen 2024.  Trying to watch diet.  He is drinking less soda and less fast food.  Not counting calories with lose it sheri.  Drinking 2 small sodas daily.  Exercise - previously Running for 1 hour, 2-3 times per week, previously Jujitsu for 1 hour, 2-3 times per week - limited due to back pain,  Not Weightlifting for 1 hour, 2-3 times per week.       HTN - On Losartan 100mg QD.  No other HTN meds previously.  No BP check outside of office on arm cuff.     Hyperlipidemia - No statin previously.     H/O Grave's Disease / Hyperthyroidism - s/p Tapazole x 2 years, then radioactive iodine, which resulted in euthyroid status.       Anxiety / Depression - He is concerned about ADHD.  He has problems focusing and did so in school as a child, had average grades.  Held back in elementary school due to losing focus and daydreaming.  Needs repetition to learn.  He states his job performance is ok.  He has difficulty reading or listening to podcasts.  Wife states patient zones out at home.  He  loses things at home.  Mood has suzanne decreased.  On increased Prozac 40mg QD. D/C increased Effexor XR 225mg QD due to nightmares and inefficacy.  D/C Wellbutrin XL 300mg QD in AM and Cymbalta 90mg QD.   Previous Management per Psych at [facility] - last seen   - F/U PRN as was D/C to PCP Management.  Previously on Zoloft, Prozac - did fine with these meds, Lexapro 20mg QD ineffective.  No other mood meds or higher dose previously.  Sees counselor q2-4 weeks  - (no longer attending marriage counseling with wife), and individual - helpful.  Next appt .  Poor social supports.  No SI/HI/AH/VH.          PHQ-2/9 Depression Screening       Little interest or pleasure in doing things: 1 - several days  Feeling down, depressed, or hopeless: 1 - several days  Trouble falling or staying asleep, or sleeping too much: 1 - several days  Feeling tired or having little energy: 1 - several days  Poor appetite or overeatin - not at all  Feeling bad about yourself - or that you are a failure or have let yourself or your family down: 1 - several days  Trouble concentrating on things, such as reading the newspaper or watching television: 0 - not at all  Moving or speaking so slowly that other people could have noticed. Or the opposite - being so fidgety or restless that you have been moving around a lot more than usual: 1 - several days  Thoughts that you would be better off dead, or of hurting yourself in some way: 0 - not at all  PHQ-9 Score: 6  PHQ-9 Interpretation: Mild depression                     Chronic Lower Back Pain / Lumbar Spondylosis / Myofascial Pain Syndrome of Lumbar Spine / Bertolotti's Syndrome / Coccydynia  - Management per pain Management Dr. Ch.  Next appt  - Overdue.  Patient was advised to F/U [facility], but  Is no longer accepting new patients.  s/p Flouro Guided TPI at the S1-S2 Junction 1/10/24 s benefit, s/p B/L Piriformis infection 10//23 s benefit.  S/p B/L SI joint  injection 9/6/23 - worse symptoms today.  s/p 6/5/23 for ALEC s benefit.  Management per Ortho Spine Dr. Humphrey - next appt PRN.  Has daily spasms on medial sacrum, radiates into B/L buttocks.  Injured his back in 2015 during  training in [place].  He is using Flexeril 10mg 1/2 -1 daily PRN - needing to use 1/2 pill daily as spasms are worsening, taking 2-3 times per week when not working.   Previously seen [facility] 2021.  S/p ALEC s benefit.  Last MRI in [place] in [id number].  Surgery was not advised per advised.  Last PT in [place] 2018 s benefit.  He is attending PT 2 days per week and doing HEP, but causes pain.  Last saw Chiro 4/24 - Next appt 5/24 - Overdue.     Vitamin D Deficiency - No Drisdol.  Taking MVI and OTC Vitamin D 5,000IU daily.      ED - Management per Uro Dr. Armenta.  Next appt 1/25 - Overdue.  On Cialis 2.5mg QD - helpful.  Patient feels due to Lexapro and Wellbutrin.  D/C Viagra 50mg QD PRN - helpful, but desired daily Rx.  No other ED meds or higher doses previously.      H/O Low Testosterone - Management per [facility] Dr. Armenta.  Next appt 1/25.  Patient cannot afford Clomid.     BPH / OAB - Management per Uro Dr. Armenta.  Next appt 1/25 - Overdue.  D/C Flomax 0.4mg QD, Ditropan XL 10mg QD as ineffective.     H/O False Positive HIV test - s/p [facility] e-consult per Dr. Mayen 10/7/22, who advised ID F/U vs. PCP repeat the testing at three months, six months and one year (completed 10/12/23), and include HIV PCR testing with the routine screening if it does not already reflex that way.      Normal colonoscopy 11/12/20 by GI Dr. Michelle @ [facility].  Repeat in 10 years.          Reviewed:  Labs 3/24/25, Pain Management 5/7/24, Nutrition 5/3/23, Uro 7/31/24, Ortho 9/11/23, Podiatry 2/12/24, Chiro 4/12/24     Overdue for Dentist.  Sees Optho q2 years.         The following portions of the patient's history were reviewed and updated as appropriate: allergies, current medications, past family history,  "past medical history, past social history, past surgical history and problem list.      Review of Systems   Constitutional:  Negative for appetite change, chills, diaphoresis, fatigue, fever and unexpected weight change.   Respiratory:  Negative for chest tightness and shortness of breath.    Cardiovascular:  Negative for chest pain.   Gastrointestinal:  Negative for abdominal pain, blood in stool, diarrhea, nausea and vomiting.   Genitourinary:  Negative for dysuria.        Current Outpatient Medications   Medication Sig Dispense Refill    alfuzosin (UROXATRAL) 10 mg 24 hr tablet Take 1 tablet (10 mg total) by mouth daily 90 tablet 0    Cholecalciferol (Vitamin D3) 125 MCG (5000 UT) TABS Take 5,000 Units by mouth in the morning.      cyclobenzaprine (FLEXERIL) 10 mg tablet Take 1 tablet (10 mg total) by mouth daily as needed for muscle spasms 90 tablet 1    FLUoxetine (PROzac) 40 MG capsule Take 2 capsules (80 mg total) by mouth daily 60 capsule 5    losartan (COZAAR) 100 MG tablet TAKE 1 TABLET BY MOUTH EVERY DAY 90 tablet 1    tadalafil (CIALIS) 2.5 MG tablet Take 1 tablet (2.5 mg total) by mouth daily Rx per Uro 100 tablet 1    Boswellia-Glucosamine-Vit D (Osteo Bi-Flex One Per Day) TABS Take 2 tablets by mouth in the morning (Patient not taking: Reported on 5/29/2025)      [employer] FIBER GUMMIES PO Take 2 tablets by mouth in the morning (Patient not taking: Reported on 5/29/2025)      Multiple Vitamin (multivitamin) tablet Take 1 tablet by mouth daily (Patient not taking: Reported on 5/29/2025)      oxybutynin (DITROPAN-XL) 10 MG 24 hr tablet Take 1 tablet (10 mg total) by mouth daily at bedtime (Patient not taking: Reported on 5/29/2025) 90 tablet 0     No current facility-administered medications for this visit.       Objective:  /76   Pulse 76   Temp 97.8 °F (36.6 °C) (Temporal)   Ht 5' 10\" (1.778 m)   Wt 84.4 kg (186 lb)   SpO2 97%   BMI 26.69 kg/m²    Wt Readings from Last 3 Encounters:   05/29/25 " "84.4 kg (186 lb)   04/11/25 84.7 kg (186 lb 12.8 oz)   03/25/25 85.6 kg (188 lb 12.8 oz)      BP Readings from Last 3 Encounters:   05/29/25 102/76   04/11/25 120/72   03/25/25 110/78          Physical Exam  Vitals and nursing note reviewed.   Constitutional:       General: He is not in acute distress.     Appearance: Normal appearance. He is well-developed and normal weight. He is not ill-appearing or toxic-appearing.   HENT:      Head: Normocephalic and atraumatic.     Eyes:      Conjunctiva/sclera: Conjunctivae normal.     Neck:      Thyroid: No thyromegaly.     Cardiovascular:      Rate and Rhythm: Normal rate and regular rhythm.      Pulses: Normal pulses.      Heart sounds: Normal heart sounds.   Pulmonary:      Effort: Pulmonary effort is normal.      Breath sounds: Normal breath sounds.   Abdominal:      General: Bowel sounds are normal.     Musculoskeletal:         General: No swelling or tenderness.      Cervical back: Neck supple.      Right lower leg: No edema.      Left lower leg: No edema.   Lymphadenopathy:      Cervical: No cervical adenopathy.     Neurological:      General: No focal deficit present.      Mental Status: He is alert and oriented to person, place, and time.     Psychiatric:         Mood and Affect: Mood normal.         Behavior: Behavior normal.         Thought Content: Thought content normal.         Judgment: Judgment normal.         Lab Results:      Lab Results   Component Value Date    WBC 6.53 03/24/2025    HGB 15.8 03/24/2025    HCT 48.1 03/24/2025     03/24/2025    TRIG 325 (H) 03/24/2025    HDL 33 (L) 03/24/2025    LDLDIRECT 102 (H) 03/24/2025    ALT 16 03/24/2025    AST 17 03/24/2025    K 4.1 03/24/2025     03/24/2025    CREATININE 1.14 03/24/2025    BUN 20 03/24/2025    CO2 28 03/24/2025    TSH 2.35 09/28/2022    PSA 0.561 06/17/2024    GLUF 92 03/24/2025    HGBA1C 5.5 03/24/2025     No results found for: \"URICACID\"  Invalid input(s): \"BASENAME\" Vitamin D    No " results found.     POCT Labs

## 2025-05-29 NOTE — ASSESSMENT & PLAN NOTE
Depression Screening Follow-up Plan: Patient's depression screening was positive with a PHQ-9 score of 6. Patient advised to follow-up with PCP for further management.    Stable.  Continue Prozac 80mg QD and counseling.

## 2025-06-11 ENCOUNTER — APPOINTMENT (OUTPATIENT)
Dept: LAB | Facility: CLINIC | Age: 50
End: 2025-06-11
Payer: COMMERCIAL

## 2025-06-11 ENCOUNTER — CLINICAL SUPPORT (OUTPATIENT)
Dept: FAMILY MEDICINE CLINIC | Facility: CLINIC | Age: 50
End: 2025-06-11
Payer: COMMERCIAL

## 2025-06-11 DIAGNOSIS — Z23 ENCOUNTER FOR IMMUNIZATION: ICD-10-CM

## 2025-06-11 DIAGNOSIS — F41.9 ANXIETY: ICD-10-CM

## 2025-06-11 DIAGNOSIS — Z12.5 SCREENING FOR PROSTATE CANCER: ICD-10-CM

## 2025-06-11 DIAGNOSIS — F32.0 CURRENT MILD EPISODE OF MAJOR DEPRESSIVE DISORDER, UNSPECIFIED WHETHER RECURRENT (HCC): ICD-10-CM

## 2025-06-11 LAB
ALBUMIN SERPL BCG-MCNC: 4.4 G/DL (ref 3.5–5)
ALP SERPL-CCNC: 60 U/L (ref 34–104)
ALT SERPL W P-5'-P-CCNC: 14 U/L (ref 7–52)
ANION GAP SERPL CALCULATED.3IONS-SCNC: 8 MMOL/L (ref 4–13)
AST SERPL W P-5'-P-CCNC: 19 U/L (ref 13–39)
BILIRUB SERPL-MCNC: 0.58 MG/DL (ref 0.2–1)
BUN SERPL-MCNC: 15 MG/DL (ref 5–25)
CALCIUM SERPL-MCNC: 9.5 MG/DL (ref 8.4–10.2)
CHLORIDE SERPL-SCNC: 104 MMOL/L (ref 96–108)
CO2 SERPL-SCNC: 26 MMOL/L (ref 21–32)
CREAT SERPL-MCNC: 1.11 MG/DL (ref 0.6–1.3)
GFR SERPL CREATININE-BSD FRML MDRD: 77 ML/MIN/1.73SQ M
GLUCOSE P FAST SERPL-MCNC: 90 MG/DL (ref 65–99)
POTASSIUM SERPL-SCNC: 4.6 MMOL/L (ref 3.5–5.3)
PROT SERPL-MCNC: 6.7 G/DL (ref 6.4–8.4)
PSA SERPL-MCNC: 0.61 NG/ML (ref 0–4)
SODIUM SERPL-SCNC: 138 MMOL/L (ref 135–147)

## 2025-06-11 PROCEDURE — 90750 HZV VACC RECOMBINANT IM: CPT | Performed by: FAMILY MEDICINE

## 2025-06-11 PROCEDURE — 90471 IMMUNIZATION ADMIN: CPT | Performed by: FAMILY MEDICINE

## 2025-06-11 PROCEDURE — 80053 COMPREHEN METABOLIC PANEL: CPT

## 2025-06-12 ENCOUNTER — RESULTS FOLLOW-UP (OUTPATIENT)
Dept: OTHER | Facility: HOSPITAL | Age: 50
End: 2025-06-12

## 2025-06-15 DIAGNOSIS — N52.8 OTHER MALE ERECTILE DYSFUNCTION: ICD-10-CM

## 2025-06-15 RX ORDER — TADALAFIL 2.5 MG/1
2.5 TABLET ORAL DAILY
Qty: 90 TABLET | Refills: 1 | Status: SHIPPED | OUTPATIENT
Start: 2025-06-15

## 2025-07-07 DIAGNOSIS — N32.81 OAB (OVERACTIVE BLADDER): ICD-10-CM

## 2025-07-07 DIAGNOSIS — N40.1 BENIGN PROSTATIC HYPERPLASIA WITH URINARY FREQUENCY: ICD-10-CM

## 2025-07-07 DIAGNOSIS — R35.0 BENIGN PROSTATIC HYPERPLASIA WITH URINARY FREQUENCY: ICD-10-CM

## 2025-07-08 RX ORDER — OXYBUTYNIN CHLORIDE 10 MG/1
10 TABLET, EXTENDED RELEASE ORAL
Qty: 90 TABLET | Refills: 0 | Status: SHIPPED | OUTPATIENT
Start: 2025-07-08

## 2025-07-08 RX ORDER — ALFUZOSIN HYDROCHLORIDE 10 MG/1
10 TABLET, EXTENDED RELEASE ORAL DAILY
Qty: 90 TABLET | Refills: 0 | Status: SHIPPED | OUTPATIENT
Start: 2025-07-08

## 2025-07-11 NOTE — PROGRESS NOTES
Name: Rojelio Lorenzo      : 1975      MRN: [id number]  Encounter Provider: CUATE Car  Encounter Date: 2025   [facility]  :  Assessment & Plan  OAB (overactive bladder)  Patient has trialed tamsulosin as well as alfuzosin without relief.  Patient also trialed oxybutynin without relief.  PVR: 7 mL  I do think patient would benefit for cystoscopy patient has failed multiple medications without relief.  Discussed depending on physician's thoughts could  Orders:    US kidney and bladder with pvr; Future    POCT urine dip auto non-scope    Benign prostatic hyperplasia with urinary frequency  PVR: 7 mL  Patient has tried tamsulosin and alfuzosin without relief  Urine dip: Negative for infection or blood  Patient still having frequency and urgency do think patient benefit from cystoscopy due to failed medications.       Screening for prostate cancer  PSA: 0.606 (2025)  We will repeat 1 year  Denies family history  Previous SELINA completed 2025           History of Present Illness   Rojelio Lorenzo is a 49 y.o. male who presents for 3-month follow-up for overactive bladder.  Patient last seen in our office on 2025 patient was on Flomax 0.4 mg without relief.  It was recommended to trial oxybutynin.  Trial 10 mg.  Patient also tried tamsulosin without relief trialed alfuzosin..  Patient reports nocturia times 2 at night.  Patient reports frequency every hour during the day.  Patient denies hematuria, dysuria.  Patient denies history of kidney stones.  Patient reports good urinary stream.      Review of Systems   Constitutional:  Negative for chills and fever.   HENT:  Negative for ear pain and sore throat.    Eyes:  Negative for pain and visual disturbance.   Respiratory:  Negative for cough and shortness of breath.    Cardiovascular:  Negative for chest pain and palpitations.   Gastrointestinal:  Negative for abdominal pain and  "vomiting.   Genitourinary:  Positive for frequency and urgency. Negative for dysuria and hematuria.   Musculoskeletal:  Negative for arthralgias and back pain.   Skin:  Negative for color change and rash.   Neurological:  Negative for seizures and syncope.   All other systems reviewed and are negative.         Objective   /60 (BP Location: Left arm, Patient Position: Sitting, Cuff Size: Standard)   Pulse 100   Ht 5' 10\" (1.778 m)   Wt 82.1 kg (181 lb)   SpO2 95%   BMI 25.97 kg/m²     Physical Exam  Vitals and nursing note reviewed.   Constitutional:       General: He is not in acute distress.     Appearance: He is well-developed.   HENT:      Head: Normocephalic and atraumatic.     Eyes:      Conjunctiva/sclera: Conjunctivae normal.       Cardiovascular:      Rate and Rhythm: Normal rate and regular rhythm.      Heart sounds: No murmur heard.  Pulmonary:      Effort: Pulmonary effort is normal. No respiratory distress.      Breath sounds: Normal breath sounds.   Abdominal:      Palpations: Abdomen is soft.      Tenderness: There is no abdominal tenderness.     Musculoskeletal:         General: No swelling.      Cervical back: Neck supple.     Skin:     General: Skin is warm and dry.      Capillary Refill: Capillary refill takes less than 2 seconds.     Neurological:      Mental Status: He is alert.     Psychiatric:         Mood and Affect: Mood normal.           Results   Lab Results   Component Value Date    PSA 0.606 06/11/2025    PSA 0.561 06/17/2024    PSA 0.64 05/26/2023     Lab Results   Component Value Date    CALCIUM 9.5 06/11/2025    K 4.6 06/11/2025    CO2 26 06/11/2025     06/11/2025    BUN 15 06/11/2025    CREATININE 1.11 06/11/2025     Lab Results   Component Value Date    WBC 6.53 03/24/2025    HGB 15.8 03/24/2025    HCT 48.1 03/24/2025    MCV 97 03/24/2025     03/24/2025       Office Urine Dip  Recent Results (from the past hour)   POCT urine dip auto non-scope    Collection " Time: 07/14/25  3:25 PM   Result Value Ref Range     COLOR,UA yellow     CLARITY,UA clear     SPECIFIC GRAVITY,UA 1.020      PH,UA 6     LEUKOCYTE ESTERASE,UA neg     NITRITE,UA neg     GLUCOSE, UA neg     KETONES,UA trace     BILIRUBIN,UA neg     BLOOD,UA neg     POCT URINE PROTEIN 30 mg     SL AMB POCT UROBILINOGEN 2.0

## 2025-07-14 ENCOUNTER — OFFICE VISIT (OUTPATIENT)
Dept: UROLOGY | Facility: MEDICAL CENTER | Age: 50
End: 2025-07-14

## 2025-07-14 VITALS
HEIGHT: 70 IN | DIASTOLIC BLOOD PRESSURE: 60 MMHG | SYSTOLIC BLOOD PRESSURE: 116 MMHG | BODY MASS INDEX: 25.91 KG/M2 | HEART RATE: 100 BPM | WEIGHT: 181 LBS | OXYGEN SATURATION: 95 %

## 2025-07-14 DIAGNOSIS — N40.1 BENIGN PROSTATIC HYPERPLASIA WITH URINARY FREQUENCY: ICD-10-CM

## 2025-07-14 DIAGNOSIS — R35.0 BENIGN PROSTATIC HYPERPLASIA WITH URINARY FREQUENCY: ICD-10-CM

## 2025-07-14 DIAGNOSIS — Z12.5 SCREENING FOR PROSTATE CANCER: ICD-10-CM

## 2025-07-14 DIAGNOSIS — N32.81 OAB (OVERACTIVE BLADDER): Primary | ICD-10-CM

## 2025-07-14 LAB
SL AMB  POCT GLUCOSE, UA: ABNORMAL
SL AMB LEUKOCYTE ESTERASE,UA: ABNORMAL
SL AMB POCT BILIRUBIN,UA: ABNORMAL
SL AMB POCT BLOOD,UA: ABNORMAL
SL AMB POCT CLARITY,UA: CLEAR
SL AMB POCT COLOR,UA: YELLOW
SL AMB POCT KETONES,UA: ABNORMAL
SL AMB POCT NITRITE,UA: ABNORMAL
SL AMB POCT PH,UA: 6
SL AMB POCT SPECIFIC GRAVITY,UA: 1.02
SL AMB POCT URINE PROTEIN: ABNORMAL
SL AMB POCT UROBILINOGEN: 2

## 2025-07-14 NOTE — ASSESSMENT & PLAN NOTE
PVR: 7 mL  Patient has tried tamsulosin and alfuzosin without relief  Urine dip: Negative for infection or blood  Patient still having frequency and urgency do think patient benefit from cystoscopy due to failed medications.

## 2025-07-14 NOTE — ASSESSMENT & PLAN NOTE
Patient has trialed tamsulosin as well as alfuzosin without relief.  Patient also trialed oxybutynin without relief.  PVR: 7 mL  I do think patient would benefit for cystoscopy patient has failed multiple medications without relief.  Discussed depending on physician's thoughts could  Orders:    US kidney and bladder with pvr; Future    POCT urine dip auto non-scope     Mother

## 2025-07-23 ENCOUNTER — HOSPITAL ENCOUNTER (OUTPATIENT)
Dept: ULTRASOUND IMAGING | Facility: MEDICAL CENTER | Age: 50
Discharge: HOME | End: 2025-07-23
Payer: COMMERCIAL

## 2025-07-23 DIAGNOSIS — N32.81 OAB (OVERACTIVE BLADDER): ICD-10-CM

## 2025-07-23 PROCEDURE — 76770 US EXAM ABDO BACK WALL COMP: CPT

## 2025-07-28 ENCOUNTER — TELEPHONE (OUTPATIENT)
Dept: FAMILY MEDICINE CLINIC | Facility: CLINIC | Age: 50
End: 2025-07-28